# Patient Record
Sex: FEMALE | Race: WHITE | Employment: OTHER | URBAN - NONMETROPOLITAN AREA
[De-identification: names, ages, dates, MRNs, and addresses within clinical notes are randomized per-mention and may not be internally consistent; named-entity substitution may affect disease eponyms.]

---

## 2017-09-18 ENCOUNTER — APPOINTMENT (OUTPATIENT)
Dept: NUCLEAR MEDICINE | Age: 82
DRG: 069 | End: 2017-09-18
Payer: MEDICARE

## 2017-09-18 ENCOUNTER — APPOINTMENT (OUTPATIENT)
Dept: GENERAL RADIOLOGY | Age: 82
DRG: 069 | End: 2017-09-18
Payer: MEDICARE

## 2017-09-18 ENCOUNTER — HOSPITAL ENCOUNTER (OUTPATIENT)
Age: 82
Setting detail: OBSERVATION
Discharge: HOME | DRG: 069 | End: 2017-09-20
Attending: EMERGENCY MEDICINE | Admitting: FAMILY MEDICINE
Payer: MEDICARE

## 2017-09-18 DIAGNOSIS — N39.0 URINARY TRACT INFECTION WITHOUT HEMATURIA, SITE UNSPECIFIED: ICD-10-CM

## 2017-09-18 DIAGNOSIS — R07.9 CHEST PAIN, UNSPECIFIED TYPE: Primary | ICD-10-CM

## 2017-09-18 DIAGNOSIS — E87.6 HYPOKALEMIA: ICD-10-CM

## 2017-09-18 PROBLEM — R07.2 PRECORDIAL PAIN: Status: ACTIVE | Noted: 2017-09-18

## 2017-09-18 LAB
ALBUMIN SERPL-MCNC: 3 G/DL (ref 3.5–5.2)
ALP BLD-CCNC: 71 U/L (ref 35–104)
ALT SERPL-CCNC: 15 U/L (ref 5–33)
ANION GAP SERPL CALCULATED.3IONS-SCNC: 13 MMOL/L (ref 7–19)
APTT: 28.6 SEC (ref 26–36.2)
AST SERPL-CCNC: 22 U/L (ref 5–32)
BACTERIA: NEGATIVE /HPF
BASOPHILS ABSOLUTE: 0 K/UL (ref 0–0.2)
BASOPHILS RELATIVE PERCENT: 0.4 % (ref 0–1)
BILIRUB SERPL-MCNC: 0.3 MG/DL (ref 0.2–1.2)
BILIRUBIN URINE: NEGATIVE
BLOOD, URINE: NEGATIVE
BUN BLDV-MCNC: 17 MG/DL (ref 8–23)
CALCIUM SERPL-MCNC: 7.9 MG/DL (ref 8.8–10.2)
CHLORIDE BLD-SCNC: 104 MMOL/L (ref 98–111)
CLARITY: ABNORMAL
CO2: 20 MMOL/L (ref 22–29)
COLOR: YELLOW
CREAT SERPL-MCNC: 0.8 MG/DL (ref 0.5–0.9)
EOSINOPHILS ABSOLUTE: 0.3 K/UL (ref 0–0.6)
EOSINOPHILS RELATIVE PERCENT: 3.3 % (ref 0–5)
EPITHELIAL CELLS, UA: 1 /HPF (ref 0–5)
GFR NON-AFRICAN AMERICAN: >60
GLUCOSE BLD-MCNC: 105 MG/DL (ref 74–109)
GLUCOSE URINE: NEGATIVE MG/DL
HCT VFR BLD CALC: 30.8 % (ref 37–47)
HEMOGLOBIN: 10.3 G/DL (ref 12–16)
HYALINE CASTS: 2 /HPF (ref 0–8)
INR BLD: 1.03 (ref 0.88–1.18)
IRON SATURATION: 13 % (ref 14–50)
IRON: 37 UG/DL (ref 37–145)
KETONES, URINE: NEGATIVE MG/DL
LEUKOCYTE ESTERASE, URINE: ABNORMAL
LYMPHOCYTES ABSOLUTE: 2.5 K/UL (ref 1.1–4.5)
LYMPHOCYTES RELATIVE PERCENT: 32.4 % (ref 20–40)
MCH RBC QN AUTO: 32.8 PG (ref 27–31)
MCHC RBC AUTO-ENTMCNC: 33.4 G/DL (ref 33–37)
MCV RBC AUTO: 98.1 FL (ref 81–99)
MONOCYTES ABSOLUTE: 0.5 K/UL (ref 0–0.9)
MONOCYTES RELATIVE PERCENT: 6.5 % (ref 0–10)
NEUTROPHILS ABSOLUTE: 4.5 K/UL (ref 1.5–7.5)
NEUTROPHILS RELATIVE PERCENT: 57.1 % (ref 50–65)
NITRITE, URINE: NEGATIVE
OCCULT BLOOD DIAGNOSTIC: NORMAL
PDW BLD-RTO: 13.6 % (ref 11.5–14.5)
PERFORMED ON: NORMAL
PERFORMED ON: NORMAL
PH UA: 5.5
PLATELET # BLD: 167 K/UL (ref 130–400)
PMV BLD AUTO: 10.9 FL (ref 9.4–12.3)
POC TROPONIN I: 0 NG/ML (ref 0–0.08)
POC TROPONIN I: 0 NG/ML (ref 0–0.08)
POTASSIUM SERPL-SCNC: 3 MMOL/L (ref 3.5–5)
PRO-BNP: 467 PG/ML (ref 0–1800)
PROTEIN UA: NEGATIVE MG/DL
PROTHROMBIN TIME: 13.4 SEC (ref 12–14.6)
RBC # BLD: 3.14 M/UL (ref 4.2–5.4)
RBC UA: 3 /HPF (ref 0–4)
SODIUM BLD-SCNC: 137 MMOL/L (ref 136–145)
SPECIFIC GRAVITY UA: 1.01
TOTAL IRON BINDING CAPACITY: 279 UG/DL (ref 250–400)
TOTAL PROTEIN: 5.3 G/DL (ref 6.6–8.7)
UROBILINOGEN, URINE: 0.2 E.U./DL
VITAMIN B-12: 466 PG/ML (ref 211–946)
WBC # BLD: 7.8 K/UL (ref 4.8–10.8)
WBC UA: 21 /HPF (ref 0–5)

## 2017-09-18 PROCEDURE — 82272 OCCULT BLD FECES 1-3 TESTS: CPT

## 2017-09-18 PROCEDURE — 6360000002 HC RX W HCPCS: Performed by: FAMILY MEDICINE

## 2017-09-18 PROCEDURE — 71010 XR CHEST PORTABLE: CPT

## 2017-09-18 PROCEDURE — 6370000000 HC RX 637 (ALT 250 FOR IP): Performed by: FAMILY MEDICINE

## 2017-09-18 PROCEDURE — G0378 HOSPITAL OBSERVATION PER HR: HCPCS

## 2017-09-18 PROCEDURE — 99285 EMERGENCY DEPT VISIT HI MDM: CPT

## 2017-09-18 PROCEDURE — 82747 ASSAY OF FOLIC ACID RBC: CPT

## 2017-09-18 PROCEDURE — 2580000003 HC RX 258: Performed by: INTERNAL MEDICINE

## 2017-09-18 PROCEDURE — 6370000000 HC RX 637 (ALT 250 FOR IP): Performed by: INTERNAL MEDICINE

## 2017-09-18 PROCEDURE — 85025 COMPLETE CBC W/AUTO DIFF WBC: CPT

## 2017-09-18 PROCEDURE — 83550 IRON BINDING TEST: CPT

## 2017-09-18 PROCEDURE — 83880 ASSAY OF NATRIURETIC PEPTIDE: CPT

## 2017-09-18 PROCEDURE — 87185 SC STD ENZYME DETCJ PER NZM: CPT

## 2017-09-18 PROCEDURE — 84484 ASSAY OF TROPONIN QUANT: CPT

## 2017-09-18 PROCEDURE — 87086 URINE CULTURE/COLONY COUNT: CPT

## 2017-09-18 PROCEDURE — 99284 EMERGENCY DEPT VISIT MOD MDM: CPT | Performed by: EMERGENCY MEDICINE

## 2017-09-18 PROCEDURE — 6360000002 HC RX W HCPCS: Performed by: EMERGENCY MEDICINE

## 2017-09-18 PROCEDURE — 82607 VITAMIN B-12: CPT

## 2017-09-18 PROCEDURE — 6370000000 HC RX 637 (ALT 250 FOR IP): Performed by: NURSE PRACTITIONER

## 2017-09-18 PROCEDURE — 87077 CULTURE AEROBIC IDENTIFY: CPT

## 2017-09-18 PROCEDURE — 93005 ELECTROCARDIOGRAM TRACING: CPT

## 2017-09-18 PROCEDURE — 85730 THROMBOPLASTIN TIME PARTIAL: CPT

## 2017-09-18 PROCEDURE — 36415 COLL VENOUS BLD VENIPUNCTURE: CPT

## 2017-09-18 PROCEDURE — 6360000002 HC RX W HCPCS: Performed by: INTERNAL MEDICINE

## 2017-09-18 PROCEDURE — 78452 HT MUSCLE IMAGE SPECT MULT: CPT

## 2017-09-18 PROCEDURE — 81001 URINALYSIS AUTO W/SCOPE: CPT

## 2017-09-18 PROCEDURE — 85610 PROTHROMBIN TIME: CPT

## 2017-09-18 PROCEDURE — 83540 ASSAY OF IRON: CPT

## 2017-09-18 PROCEDURE — 80053 COMPREHEN METABOLIC PANEL: CPT

## 2017-09-18 PROCEDURE — 3430000000 HC RX DIAGNOSTIC RADIOPHARMACEUTICAL: Performed by: FAMILY MEDICINE

## 2017-09-18 PROCEDURE — 93017 CV STRESS TEST TRACING ONLY: CPT

## 2017-09-18 PROCEDURE — A9500 TC99M SESTAMIBI: HCPCS | Performed by: FAMILY MEDICINE

## 2017-09-18 PROCEDURE — 2580000003 HC RX 258: Performed by: EMERGENCY MEDICINE

## 2017-09-18 RX ORDER — POTASSIUM CHLORIDE 20 MEQ/1
20 TABLET, EXTENDED RELEASE ORAL ONCE
Status: DISCONTINUED | OUTPATIENT
Start: 2017-09-18 | End: 2017-09-18

## 2017-09-18 RX ORDER — POTASSIUM CHLORIDE 7.45 MG/ML
10 INJECTION INTRAVENOUS ONCE
Status: COMPLETED | OUTPATIENT
Start: 2017-09-18 | End: 2017-09-18

## 2017-09-18 RX ORDER — M-VIT,TX,IRON,MINS/CALC/FOLIC 27MG-0.4MG
1 TABLET ORAL DAILY
Status: DISCONTINUED | OUTPATIENT
Start: 2017-09-18 | End: 2017-09-18

## 2017-09-18 RX ORDER — DOXYCYCLINE HYCLATE 100 MG/1
100 CAPSULE ORAL EVERY 12 HOURS SCHEDULED
Status: DISCONTINUED | OUTPATIENT
Start: 2017-09-18 | End: 2017-09-20 | Stop reason: HOSPADM

## 2017-09-18 RX ORDER — TOPIRAMATE 50 MG/1
25 TABLET, FILM COATED ORAL DAILY
Status: DISCONTINUED | OUTPATIENT
Start: 2017-09-18 | End: 2017-09-20 | Stop reason: HOSPADM

## 2017-09-18 RX ORDER — ACETAMINOPHEN 325 MG/1
650 TABLET ORAL EVERY 4 HOURS PRN
Status: DISCONTINUED | OUTPATIENT
Start: 2017-09-18 | End: 2017-09-18 | Stop reason: SDUPTHER

## 2017-09-18 RX ORDER — PANTOPRAZOLE SODIUM 40 MG/1
40 TABLET, DELAYED RELEASE ORAL
Status: DISCONTINUED | OUTPATIENT
Start: 2017-09-18 | End: 2017-09-18

## 2017-09-18 RX ORDER — ACETAMINOPHEN 325 MG/1
650 TABLET ORAL EVERY 4 HOURS PRN
Status: DISCONTINUED | OUTPATIENT
Start: 2017-09-18 | End: 2017-09-20 | Stop reason: HOSPADM

## 2017-09-18 RX ORDER — OXYBUTYNIN CHLORIDE 5 MG/1
10 TABLET, EXTENDED RELEASE ORAL NIGHTLY
Status: DISCONTINUED | OUTPATIENT
Start: 2017-09-18 | End: 2017-09-18

## 2017-09-18 RX ORDER — ASPIRIN 325 MG
325 TABLET ORAL ONCE
Status: DISCONTINUED | OUTPATIENT
Start: 2017-09-18 | End: 2017-09-18

## 2017-09-18 RX ORDER — MIDODRINE HYDROCHLORIDE 5 MG/1
5 TABLET ORAL 2 TIMES DAILY
Status: DISCONTINUED | OUTPATIENT
Start: 2017-09-18 | End: 2017-09-20 | Stop reason: HOSPADM

## 2017-09-18 RX ORDER — ATORVASTATIN CALCIUM 20 MG/1
20 TABLET, FILM COATED ORAL DAILY
Status: DISCONTINUED | OUTPATIENT
Start: 2017-09-18 | End: 2017-09-18

## 2017-09-18 RX ORDER — ATORVASTATIN CALCIUM 20 MG/1
20 TABLET, FILM COATED ORAL DAILY
Status: DISCONTINUED | OUTPATIENT
Start: 2017-09-18 | End: 2017-09-20 | Stop reason: HOSPADM

## 2017-09-18 RX ORDER — LEVOTHYROXINE SODIUM 0.1 MG/1
100 TABLET ORAL DAILY
Status: DISCONTINUED | OUTPATIENT
Start: 2017-09-18 | End: 2017-09-20 | Stop reason: HOSPADM

## 2017-09-18 RX ORDER — POTASSIUM CHLORIDE 3 G/15ML
40 SOLUTION ORAL ONCE
Status: COMPLETED | OUTPATIENT
Start: 2017-09-18 | End: 2017-09-18

## 2017-09-18 RX ORDER — POTASSIUM CHLORIDE 7.45 MG/ML
10 INJECTION INTRAVENOUS
Status: COMPLETED | OUTPATIENT
Start: 2017-09-18 | End: 2017-09-18

## 2017-09-18 RX ORDER — ASPIRIN 81 MG/1
81 TABLET, CHEWABLE ORAL DAILY
Status: DISCONTINUED | OUTPATIENT
Start: 2017-09-18 | End: 2017-09-20 | Stop reason: HOSPADM

## 2017-09-18 RX ORDER — ONDANSETRON 2 MG/ML
4 INJECTION INTRAMUSCULAR; INTRAVENOUS EVERY 6 HOURS PRN
Status: DISCONTINUED | OUTPATIENT
Start: 2017-09-18 | End: 2017-09-20 | Stop reason: HOSPADM

## 2017-09-18 RX ORDER — TOLTERODINE 4 MG/1
4 CAPSULE, EXTENDED RELEASE ORAL NIGHTLY
Status: DISCONTINUED | OUTPATIENT
Start: 2017-09-18 | End: 2017-09-20 | Stop reason: HOSPADM

## 2017-09-18 RX ORDER — SODIUM CHLORIDE 0.9 % (FLUSH) 0.9 %
10 SYRINGE (ML) INJECTION EVERY 12 HOURS SCHEDULED
Status: DISCONTINUED | OUTPATIENT
Start: 2017-09-18 | End: 2017-09-20 | Stop reason: HOSPADM

## 2017-09-18 RX ORDER — LEVOTHYROXINE SODIUM 0.1 MG/1
100 TABLET ORAL DAILY
Status: DISCONTINUED | OUTPATIENT
Start: 2017-09-18 | End: 2017-09-18

## 2017-09-18 RX ORDER — TOPIRAMATE 25 MG/1
25 TABLET ORAL DAILY
Status: DISCONTINUED | OUTPATIENT
Start: 2017-09-18 | End: 2017-09-18

## 2017-09-18 RX ORDER — SODIUM CHLORIDE 0.9 % (FLUSH) 0.9 %
10 SYRINGE (ML) INJECTION PRN
Status: DISCONTINUED | OUTPATIENT
Start: 2017-09-18 | End: 2017-09-19 | Stop reason: SDUPTHER

## 2017-09-18 RX ORDER — POTASSIUM CHLORIDE 20 MEQ/1
20 TABLET, EXTENDED RELEASE ORAL 2 TIMES DAILY WITH MEALS
Status: DISCONTINUED | OUTPATIENT
Start: 2017-09-18 | End: 2017-09-20 | Stop reason: HOSPADM

## 2017-09-18 RX ORDER — OMEPRAZOLE 20 MG/1
20 CAPSULE, DELAYED RELEASE ORAL 2 TIMES DAILY
Status: DISCONTINUED | OUTPATIENT
Start: 2017-09-18 | End: 2017-09-20 | Stop reason: HOSPADM

## 2017-09-18 RX ORDER — MIDODRINE HYDROCHLORIDE 5 MG/1
5 TABLET ORAL 2 TIMES DAILY
Status: DISCONTINUED | OUTPATIENT
Start: 2017-09-18 | End: 2017-09-18

## 2017-09-18 RX ADMIN — TOLTERODINE 4 MG: 4 CAPSULE, EXTENDED RELEASE ORAL at 21:37

## 2017-09-18 RX ADMIN — POTASSIUM CHLORIDE 10 MEQ: 7.46 INJECTION, SOLUTION INTRAVENOUS at 16:00

## 2017-09-18 RX ADMIN — POTASSIUM CHLORIDE 10 MEQ: 7.46 INJECTION, SOLUTION INTRAVENOUS at 10:17

## 2017-09-18 RX ADMIN — ATORVASTATIN CALCIUM 20 MG: 20 TABLET, FILM COATED ORAL at 21:37

## 2017-09-18 RX ADMIN — POTASSIUM CHLORIDE 10 MEQ: 7.46 INJECTION, SOLUTION INTRAVENOUS at 15:10

## 2017-09-18 RX ADMIN — POTASSIUM CHLORIDE 20 MEQ: 20 TABLET, EXTENDED RELEASE ORAL at 10:18

## 2017-09-18 RX ADMIN — TETRAKIS(2-METHOXYISOBUTYLISOCYANIDE)COPPER(I) TETRAFLUOROBORATE 30 MILLICURIE: 1 INJECTION, POWDER, LYOPHILIZED, FOR SOLUTION INTRAVENOUS at 15:33

## 2017-09-18 RX ADMIN — MIDODRINE HYDROCHLORIDE 5 MG: 5 TABLET ORAL at 17:19

## 2017-09-18 RX ADMIN — TOPIRAMATE 25 MG: 50 TABLET, FILM COATED ORAL at 15:32

## 2017-09-18 RX ADMIN — TETRAKIS(2-METHOXYISOBUTYLISOCYANIDE)COPPER(I) TETRAFLUOROBORATE 10 MILLICURIE: 1 INJECTION, POWDER, LYOPHILIZED, FOR SOLUTION INTRAVENOUS at 15:32

## 2017-09-18 RX ADMIN — ASPIRIN 81 MG 81 MG: 81 TABLET ORAL at 17:19

## 2017-09-18 RX ADMIN — Medication 10 ML: at 21:41

## 2017-09-18 RX ADMIN — Medication 10 ML: at 12:21

## 2017-09-18 RX ADMIN — POTASSIUM CHLORIDE 20 MEQ: 20 TABLET, EXTENDED RELEASE ORAL at 17:21

## 2017-09-18 RX ADMIN — POTASSIUM CHLORIDE 10 MEQ: 7.46 INJECTION, SOLUTION INTRAVENOUS at 06:38

## 2017-09-18 RX ADMIN — DOXYCYCLINE HYCLATE 100 MG: 100 CAPSULE, GELATIN COATED ORAL at 12:20

## 2017-09-18 RX ADMIN — CEFTRIAXONE 1 G: 1 INJECTION, POWDER, FOR SOLUTION INTRAMUSCULAR; INTRAVENOUS at 06:33

## 2017-09-18 RX ADMIN — Medication 40 MEQ: at 01:51

## 2017-09-18 RX ADMIN — DOXYCYCLINE HYCLATE 100 MG: 100 CAPSULE, GELATIN COATED ORAL at 21:36

## 2017-09-18 RX ADMIN — ENOXAPARIN SODIUM 40 MG: 40 INJECTION SUBCUTANEOUS at 10:17

## 2017-09-18 RX ADMIN — REGADENOSON 0.4 MG: 0.08 INJECTION, SOLUTION INTRAVENOUS at 15:33

## 2017-09-18 RX ADMIN — POTASSIUM CHLORIDE 10 MEQ: 7.46 INJECTION, SOLUTION INTRAVENOUS at 17:20

## 2017-09-18 RX ADMIN — LEVOTHYROXINE SODIUM 100 MCG: 0.1 TABLET ORAL at 15:33

## 2017-09-18 RX ADMIN — OMEPRAZOLE 20 MG: 20 CAPSULE, DELAYED RELEASE ORAL at 21:37

## 2017-09-18 ASSESSMENT — ENCOUNTER SYMPTOMS
RESPIRATORY NEGATIVE: 1
SHORTNESS OF BREATH: 0
ABDOMINAL DISTENTION: 0
GASTROINTESTINAL NEGATIVE: 1
EYE DISCHARGE: 0
NAUSEA: 0
VOMITING: 0
TROUBLE SWALLOWING: 0
EYES NEGATIVE: 1
EYE REDNESS: 0
ALLERGIC/IMMUNOLOGIC NEGATIVE: 1
PHOTOPHOBIA: 0
ABDOMINAL PAIN: 0
DIARRHEA: 0
COUGH: 0

## 2017-09-18 ASSESSMENT — PAIN SCALES - GENERAL
PAINLEVEL_OUTOF10: 5
PAINLEVEL_OUTOF10: 0

## 2017-09-18 ASSESSMENT — PAIN DESCRIPTION - DESCRIPTORS: DESCRIPTORS: THROBBING

## 2017-09-18 ASSESSMENT — PAIN DESCRIPTION - LOCATION: LOCATION: CHEST

## 2017-09-18 ASSESSMENT — PAIN DESCRIPTION - ORIENTATION: ORIENTATION: MID;LEFT

## 2017-09-18 NOTE — PLAN OF CARE
Problem: SAFETY  Goal: Free from accidental physical injury  Outcome: Ongoing  Goal: Free from intentional harm  Outcome: Ongoing    Problem: DAILY CARE  Goal: Daily care needs are met  Outcome: Ongoing    Problem: PAIN  Goal: Patients pain/discomfort is manageable  Outcome: Ongoing    Problem: SKIN INTEGRITY  Goal: Skin integrity is maintained or improved  Outcome: Ongoing

## 2017-09-18 NOTE — IP AVS SNAPSHOT
After Visit Summary  (Discharge Instructions)    Medication List for Home    Based on the information you provided to us as well as any changes during this visit, the following is your updated medication list.  Compare this with your prescription bottles at home. If you have any questions or concerns, contact your primary care physician's office. Daily Medication List (This medication list can be shared with any healthcare provider who is helping you manage your medications)      There are NEW medications for you.  START taking them after you leave the hospital        Last Dose    Next Dose Due AM NOON PM NIGHT    aspirin 81 MG chewable tablet   Take 1 tablet by mouth daily                81 mg on 9/20/2017  9:09 AM     9/21                          ciprofloxacin 500 MG tablet   Commonly known as:  CIPRO   Take 0.5 tablets by mouth 2 times daily for 10 days                  9/20                          metoprolol succinate 25 MG extended release tablet   Commonly known as:  TOPROL XL   Take 1 tablet by mouth daily                25 mg on 9/20/2017  9:09 AM     9/21                            These are medications you told us you were taking at home, CONTINUE taking them after you leave the hospital        Last Dose    Next Dose Due AM NOON PM NIGHT    Acetaminophen 650 MG Tabs   Take 650 mg by mouth every 4 hours as needed                  As needed                       atorvastatin 20 MG tablet   Commonly known as:  LIPITOR   Take 20 mg by mouth nightly                20 mg on 9/20/2017  9:17 AM     9/20                          levothyroxine 100 MCG tablet   Commonly known as:  SYNTHROID   Take 100 mcg by mouth Daily                100 mcg on 9/20/2017  5:52 AM     9/21                          midodrine 2.5 MG tablet   Commonly known as:  PROAMATINE   Take 5 mg by mouth 2 times daily                5 mg on 9/20/2017  9:22 AM     9/20 changed, so think of one that is secure and easy to remember. 6. Create a [employer] password. You can change your password at any time. 7. Enter your Password Reset Question and Answer. This can be used at a later time if you forget your password. 8. Enter your e-mail address. You will receive e-mail notification when new information is available in [address] 9. Click Sign Up. You can now view your medical record. Additional Information  If you have questions, please contact the physician practice where you receive care. Remember, [employer] is NOT to be used for urgent needs. For medical emergencies, dial 911. For questions regarding your [employer] account call [phone]. If you have a clinical question, please call your doctor's office. View your information online  ? Review your current list of  medications, immunization, and allergies. ? Review your future test results online . ? Review your discharge instructions provided by your caregivers at discharge    Certain functionality such as prescription refills, scheduling appointments or sending messages to your provider are not activated if your provider does not use [employer] in his/her office    For questions regarding your [employer] account call [phone]. If you have a clinical question, please call your doctor's office. The information on all pages of the After Visit Summary has been reviewed with me, the patient and/or responsible adult, by my health care provider(s). I had the opportunity to ask questions regarding this information. I understand I should dispose of my armband safely at home to protect my health information. A complete copy of the After Visit Summary has been given to me, the patient and/or responsible adult.            Patient Signature/Responsible Adult:____________________    Clinician Signature:_____________________    Date:_____________________    Time:_____________________

## 2017-09-18 NOTE — ED PROVIDER NOTES
Genitourinary: Negative for difficulty urinating. Skin: Negative for rash and wound. Neurological: Negative for dizziness, light-headedness and headaches. Psychiatric/Behavioral: Negative for behavioral problems and confusion. All other systems reviewed and are negative. Except as noted above the remainder of the review of systems was reviewed and negative. PAST MEDICAL HISTORY     Past Medical History:   Diagnosis Date    Anxiety     GERD (gastroesophageal reflux disease)     Macular degeneration     Pneumonia     Weakness          SURGICAL HISTORY       Past Surgical History:   Procedure Laterality Date    CATARACT REMOVAL WITH IMPLANT  10/2015    right eye    CHOLECYSTECTOMY      COLON SURGERY      straighten kink    HIP SURGERY      right replacement         CURRENT MEDICATIONS       Current Discharge Medication List      CONTINUE these medications which have NOT CHANGED    Details   acetaminophen 650 MG TABS Take 650 mg by mouth every 4 hours as needed  Qty: 120 tablet, Refills: 3      omeprazole (PRILOSEC) 20 MG capsule Take 20 mg by mouth 2 times daily      midodrine (PROAMATINE) 2.5 MG tablet Take 5 mg by mouth 2 times daily       levothyroxine (SYNTHROID) 100 MCG tablet Take 100 mcg by mouth Daily      topiramate (TOPAMAX) 25 MG tablet Take 25 mg by mouth daily      atorvastatin (LIPITOR) 20 MG tablet Take 20 mg by mouth nightly       tolterodine (DETROL LA) 4 MG ER capsule Take 4 mg by mouth nightly       Multiple Vitamins-Minerals (THERAPEUTIC MULTIVITAMIN-MINERALS) tablet Take 1 tablet by mouth daily             ALLERGIES     Review of patient's allergies indicates no known allergies.     FAMILY HISTORY       Family History   Problem Relation Age of Onset    Heart Disease Mother     Stroke Mother     Heart Disease Father     Heart Disease Sister     Obesity Sister     No Known Problems Brother           SOCIAL HISTORY       Social History     Social History    Marital previous  Read by Dr Rui Palacio at [address] [place] [address]:   Non-plain film images such as CT, Ultrasound and MRI are read by the radiologist. Plain radiographic images are visualized and preliminarily interpreted by the emergency physician with the below findings:      Interpretation per the Radiologist below, if available at the time of this note:    XR Chest Portable   Final Result   Impression:   1. No acute cardiopulmonary disease. 2.  Findings suggestive of COPD. Signed by Dr Judy Presley on 9/18/2017 7:26 AM      NM Myocardial Spect Rest Multi    (Results Pending)         ED BEDSIDE ULTRASOUND:   Performed by ED Physician - none    LABS:  Labs Reviewed   CBC WITH AUTO DIFFERENTIAL - Abnormal; Notable for the following:        Result Value    RBC 3.14 (*)     Hemoglobin 10.3 (*)     Hematocrit 30.8 (*)     MCH 32.8 (*)     All other components within normal limits   COMPREHENSIVE METABOLIC PANEL - Abnormal; Notable for the following:     Potassium 3.0 (*)     CO2 20 (*)     Calcium 7.9 (*)     Total Protein 5.3 (*)     Alb 3.0 (*)     All other components within normal limits   URINALYSIS - Abnormal; Notable for the following:     Clarity, UA CLOUDY (*)     Leukocyte Esterase, Urine MODERATE (*)     All other components within normal limits   MICROSCOPIC URINALYSIS - Abnormal; Notable for the following:     WBC, UA 21 (*)     All other components within normal limits   IRON AND TIBC - Abnormal; Notable for the following:     Iron Saturation 13 (*)     All other components within normal limits   URINE CULTURE   APTT   PROTIME-INR   BRAIN NATRIURETIC PEPTIDE   VITAMIN B12   FOLATE RBC   BLOOD OCCULT STOOL DIAGNOSTIC   POCT TROPONIN   POCT TROPONIN   POCT VENOUS   POCT VENOUS       All other labs were within normal range or not returned as of this dictation.     EMERGENCY DEPARTMENT COURSE and DIFFERENTIAL DIAGNOSIS/MDM:   Vitals:    Vitals:    09/18/17 0800 09/18/17 0819 09/18/17 1035 09/18/17 1503   BP: (!) 143/74 (!) 146/71 138/74 137/81   Pulse: 106 87 82 96   Resp: 17 16 16 16   Temp: 98.9 °F (37.2 °C) 97 °F (36.1 °C) 97 °F (36.1 °C) 97 °F (36.1 °C)   TempSrc:  Temporal Temporal Temporal   SpO2: 94% 99% 92% 96%   Weight:  84 lb (38.1 kg)     Height:  5' 1\" (1.549 m)             MDM      CONSULTS:  None    PROCEDURES:  Unless otherwise noted below, none     Procedures    FINAL IMPRESSION      1. Chest pain, unspecified type    2. Hypokalemia    3.  Urinary tract infection without hematuria, site unspecified          DISPOSITION/PLAN   DISPOSITION     PATIENT REFERRED TO:  Kirti Valencia MD  [facility] [phone]            DISCHARGE MEDICATIONS:  Current Discharge Medication List             (Please note that portions of this note were completed with a voice recognition program.  Efforts were made to edit the dictations but occasionally words are mis-transcribed.)    MARY Zheng (electronically signed)         MARY Zheng  09/18/17 [phone]

## 2017-09-18 NOTE — H&P
tablet by mouth 2 times daily (with meals) 1/30/16   Cheryle Andrade MD   nystatin (MYCOSTATIN) 297441 UNIT/ML suspension Take 5 mLs by mouth 4 times daily 1/30/16   Cheryle Andrade MD   doxycycline (MONODOX) 100 MG capsule Take 1 capsule by mouth every 12 hours 1/30/16   Cheryle Andrade MD   methylPREDNISolone (MEDROL DOSEPACK) 4 MG tablet Take by mouth as directed. 1/30/16   Cheryle Andrade MD   acetaminophen 650 MG TABS Take 650 mg by mouth every 4 hours as needed 1/18/16   Cheryle Andrade MD   omeprazole (PRILOSEC) 20 MG capsule Take 20 mg by mouth 2 times daily    Historical Provider, MD   midodrine (PROAMATINE) 2.5 MG tablet Take 5 mg by mouth 2 times daily     Historical Provider, MD   levothyroxine (SYNTHROID) 100 MCG tablet Take 100 mcg by mouth Daily    Historical Provider, MD   topiramate (TOPAMAX) 25 MG tablet Take 25 mg by mouth daily    Historical Provider, MD   atorvastatin (LIPITOR) 20 MG tablet Take 20 mg by mouth daily    Historical Provider, MD   tolterodine (DETROL LA) 4 MG ER capsule Take 4 mg by mouth daily    Historical Provider, MD   Multiple Vitamins-Minerals (THERAPEUTIC MULTIVITAMIN-MINERALS) tablet Take 1 tablet by mouth daily    Historical Provider, MD       Allergies:  Review of patient's allergies indicates no known allergies. Social History:   Social History     Social History    Marital status:      Spouse name: N/A    Number of children: N/A    Years of education: N/A     Occupational History    Not on file. Social History Main Topics    Smoking status: Never Smoker    Smokeless tobacco: Not on file    Alcohol use No    Drug use: No    Sexual activity: Not on file     Other Topics Concern    Not on file     Social History Narrative       Family History:   History reviewed. No pertinent family history.         Physical Exam:    Vitals: BP (!) 143/74  Pulse 106  Temp 98.9 °F (37.2 °C)  Resp 17  Ht 5' 1\" (1.549 m)  Wt 89 lb (40.4 kg)  SpO2 94%  BMI 16.82 kg/m2    Objective:  General Appearance:  Comfortable and in no acute distress. Vital signs: (most recent): Blood pressure (!) 143/74, pulse 106, temperature 98.9 °F (37.2 °C), resp. rate 17, height 5' 1\" (1.549 m), weight 89 lb (40.4 kg), SpO2 94 %. Vital signs are normal.  No fever. Output: Producing urine and producing stool. HEENT: Normal HEENT exam.    Lungs:  Normal respiratory rate and normal effort. Heart: Normal rate. Regular rhythm. Extremities: Normal range of motion. Neurological: Patient is alert and oriented to person, place and time. Skin:  Warm and dry. Abdomen: Abdomen is soft. Hyperactive bowel sounds. Pupils:  Pupils are equal, round, and reactive to light. Pulses: Distal pulses are intact. CBC:   Recent Labs      09/18/17 0039   WBC  7.8   HGB  10.3*   PLT  167     BMP:    Recent Labs      09/18/17 0039   NA  137   K  3.0*   CL  104   CO2  20*   BUN  17   CREATININE  0.8   GLUCOSE  105     Hepatic:   Recent Labs      09/18/17   0039   AST  22   ALT  15   BILITOT  0.3   ALKPHOS  71     Troponin T:   Recent Labs      09/18/17   0046  09/18/17   0311   TROPONINI  0.00  0.00     Pro-BNP: No results for input(s): BNP in the last 72 hours. Lipids: No results for input(s): CHOL, HDL in the last 72 hours. Invalid input(s): LDLCALCU  ABGs:   Lab Results   Component Value Date    PHART 7.530 01/05/2016    PO2ART 87.0 01/05/2016    AJH3UPT 24.0 01/05/2016     INR:   Recent Labs      09/18/17 0039   INR  1.03     -----------------------------------------------------------------  EKG: non-acute  Radiology:     Xr Chest Portable    Result Date: 9/18/2017  Exam:   XR CHEST PORTABLE  Date:  9/18/2017 History:  Female, age  80 years; chest pain COMPARISON:  Chest x-ray dated 1/24/2016 Findings : The heart and mediastinum are normal in size. Lungs are without focal infiltrate, mass or effusions. Chronic obstructive lung changes.  Suspected small hiatal hernia. The bones show no acute pathology. Impression: 1. No acute cardiopulmonary disease. 2.  Findings suggestive of COPD. Signed by Dr Kylie Archer on 9/18/2017 7:26 AM      Assessment and Plan   1. Chest Pain  2. UTI  3. Anemia  4. Hypokalemia  5.   COPD    PLAN:    ADMIT TO PCU  RULE OUT ISCHEMIA  IV ABX FOR UTI - CHECK CULTURE  ANEMIA WORKUP      Murali Perez M.D.  9/18/2017

## 2017-09-18 NOTE — ED NOTES
Bed: 06  Expected date:   Expected time:   Means of arrival: [facility]  Comments:  EMS     Igor Arzate RN  09/18/17 0891

## 2017-09-18 NOTE — ED NOTES
Called the answering service and they connected the call to Dr. Ricardo Gooden.      Bryanna Shaikh  09/18/17 0021

## 2017-09-18 NOTE — PROGRESS NOTES
Pt is resting comfortably in bed at this time. Pt has no complaints and family remains at bedside. Will continue to monitor.

## 2017-09-18 NOTE — PROGRESS NOTES
Pt am medications were held due to pt being NPO, will give medications when pt is able to eat. Pt states that she gets nauseated if NPO and takes medications.

## 2017-09-19 LAB
CHOLESTEROL, TOTAL: 126 MG/DL (ref 160–199)
EKG P AXIS: 71 DEGREES
EKG P-R INTERVAL: 176 MS
EKG Q-T INTERVAL: 350 MS
EKG QRS DURATION: 102 MS
EKG QTC CALCULATION (BAZETT): 406 MS
EKG T AXIS: 63 DEGREES
HCT VFR BLD CALC: 30.8 %
HDLC SERPL-MCNC: 73 MG/DL (ref 65–121)
LDL CHOLESTEROL CALCULATED: 43 MG/DL
LV EF: 45 %
LV EF: 63 %
LVEF MODALITY: NORMAL
LVEF MODALITY: NORMAL
RBC FOLATE: NORMAL NG/ML
TRIGL SERPL-MCNC: 48 MG/DL (ref 150–199)
TSH SERPL DL<=0.05 MIU/L-ACNC: 0.15 UIU/ML (ref 0.27–4.2)

## 2017-09-19 PROCEDURE — 36415 COLL VENOUS BLD VENIPUNCTURE: CPT

## 2017-09-19 PROCEDURE — 80061 LIPID PANEL: CPT

## 2017-09-19 PROCEDURE — 6370000000 HC RX 637 (ALT 250 FOR IP): Performed by: NURSE PRACTITIONER

## 2017-09-19 PROCEDURE — 6360000004 HC RX CONTRAST MEDICATION: Performed by: INTERNAL MEDICINE

## 2017-09-19 PROCEDURE — G0378 HOSPITAL OBSERVATION PER HR: HCPCS

## 2017-09-19 PROCEDURE — 2580000003 HC RX 258: Performed by: INTERNAL MEDICINE

## 2017-09-19 PROCEDURE — 93005 ELECTROCARDIOGRAM TRACING: CPT

## 2017-09-19 PROCEDURE — 6370000000 HC RX 637 (ALT 250 FOR IP): Performed by: INTERNAL MEDICINE

## 2017-09-19 PROCEDURE — C8923 2D TTE W OR W/O FOL W/CON,CO: HCPCS

## 2017-09-19 PROCEDURE — 2580000003 HC RX 258: Performed by: FAMILY MEDICINE

## 2017-09-19 PROCEDURE — 99204 OFFICE O/P NEW MOD 45 MIN: CPT | Performed by: INTERNAL MEDICINE

## 2017-09-19 PROCEDURE — 6360000002 HC RX W HCPCS: Performed by: FAMILY MEDICINE

## 2017-09-19 PROCEDURE — 6370000000 HC RX 637 (ALT 250 FOR IP): Performed by: FAMILY MEDICINE

## 2017-09-19 PROCEDURE — 84443 ASSAY THYROID STIM HORMONE: CPT

## 2017-09-19 RX ORDER — SODIUM CHLORIDE 0.9 % (FLUSH) 0.9 %
10 SYRINGE (ML) INJECTION PRN
Status: DISCONTINUED | OUTPATIENT
Start: 2017-09-19 | End: 2017-09-20 | Stop reason: HOSPADM

## 2017-09-19 RX ORDER — METOPROLOL SUCCINATE 25 MG/1
25 TABLET, EXTENDED RELEASE ORAL DAILY
Status: DISCONTINUED | OUTPATIENT
Start: 2017-09-19 | End: 2017-09-20 | Stop reason: HOSPADM

## 2017-09-19 RX ADMIN — OMEPRAZOLE 20 MG: 20 CAPSULE, DELAYED RELEASE ORAL at 07:53

## 2017-09-19 RX ADMIN — METOPROLOL SUCCINATE 25 MG: 25 TABLET, EXTENDED RELEASE ORAL at 11:18

## 2017-09-19 RX ADMIN — ASPIRIN 81 MG 81 MG: 81 TABLET ORAL at 07:57

## 2017-09-19 RX ADMIN — POTASSIUM CHLORIDE 20 MEQ: 20 TABLET, EXTENDED RELEASE ORAL at 17:38

## 2017-09-19 RX ADMIN — NYSTATIN 500000 UNITS: 100000 SUSPENSION ORAL at 17:40

## 2017-09-19 RX ADMIN — MIDODRINE HYDROCHLORIDE 5 MG: 5 TABLET ORAL at 07:54

## 2017-09-19 RX ADMIN — PERFLUTREN 1.5 ML: 6.52 INJECTION, SUSPENSION INTRAVENOUS at 15:30

## 2017-09-19 RX ADMIN — LEVOTHYROXINE SODIUM 100 MCG: 0.1 TABLET ORAL at 06:26

## 2017-09-19 RX ADMIN — TOPIRAMATE 25 MG: 50 TABLET, FILM COATED ORAL at 07:54

## 2017-09-19 RX ADMIN — POTASSIUM CHLORIDE 20 MEQ: 20 TABLET, EXTENDED RELEASE ORAL at 07:57

## 2017-09-19 RX ADMIN — DOXYCYCLINE HYCLATE 100 MG: 100 CAPSULE, GELATIN COATED ORAL at 20:39

## 2017-09-19 RX ADMIN — CEFTRIAXONE SODIUM 1 G: 1 INJECTION, POWDER, FOR SOLUTION INTRAMUSCULAR; INTRAVENOUS at 06:26

## 2017-09-19 RX ADMIN — ENOXAPARIN SODIUM 40 MG: 40 INJECTION SUBCUTANEOUS at 08:02

## 2017-09-19 RX ADMIN — OMEPRAZOLE 20 MG: 20 CAPSULE, DELAYED RELEASE ORAL at 20:39

## 2017-09-19 RX ADMIN — Medication 10 ML: at 20:40

## 2017-09-19 RX ADMIN — MIDODRINE HYDROCHLORIDE 5 MG: 5 TABLET ORAL at 17:38

## 2017-09-19 RX ADMIN — Medication 10 ML: at 15:30

## 2017-09-19 RX ADMIN — DOXYCYCLINE HYCLATE 100 MG: 100 CAPSULE, GELATIN COATED ORAL at 07:56

## 2017-09-19 RX ADMIN — Medication 10 ML: at 07:55

## 2017-09-19 RX ADMIN — ATORVASTATIN CALCIUM 20 MG: 20 TABLET, FILM COATED ORAL at 07:52

## 2017-09-19 RX ADMIN — Medication 1 TABLET: at 07:51

## 2017-09-19 RX ADMIN — NYSTATIN 500000 UNITS: 100000 SUSPENSION ORAL at 13:57

## 2017-09-19 RX ADMIN — TOLTERODINE 4 MG: 4 CAPSULE, EXTENDED RELEASE ORAL at 20:39

## 2017-09-19 ASSESSMENT — PAIN SCALES - GENERAL
PAINLEVEL_OUTOF10: 0

## 2017-09-19 NOTE — PLAN OF CARE
Problem: Nutrition  Goal: Optimal nutrition therapy  Outcome: Ongoing  Nutrition Problem: Severe malnutrition, in context of chronic illness  Intervention: Food and/or Nutrient Delivery: Continue current diet, Start ONS  Nutritional Goals: PO %. No new wt loss.

## 2017-09-19 NOTE — CONSULTS
[facility]      Cardiology Consultation      Date of Admission:  9/18/2017 12:31 AM    Date of Initially Being Seen / Consultation:  9/19/17    Cardiologist:  Dr. Krzysztof Brown   Attending: Dr. Suzi Wilson      PCP:  Loco Rothman MD    Reason for Consultation or Admission / Chief Complaint:  Paroxysmal atrial fibrillation    SUBJECTIVE AND HISTORY OF PRESENT ILLNESS:    Source of the history:  Patient, family, previous inpatient and outpatient records in [facility]. Laxmi Spencer is a 80 y.o. female who presents to [facility] emergency room with symptoms / signs / problem or diagnosis of chest pressure and tightness that began on the evening of 9/17/2017. Patient underwent a nuclear [facility] on 9/18/20017 which was grossly negative for the presence of ischemia or infarction with normal wall motion and ejection fraction at rest. During the night patient developed paroxymal atrial fibrillation leading to a cardiology consult. Patient states that she does have a history of this. Patient denied current chest pain, pressure, and tightness. Patient denied feeling palpitations, lightheaded, dizziness, near syncope, and syncope. Patient denied fever, chills, and recent illness. Patient denied diaphoresis, dyspnea, nausea, and vomiting.         CARDIAC RISK PROFILE:    Risk Factor Yes / No / Unknown       Gender Female   Cigarette Use No   Family History of Heart Disease Yes: Mother, Father, and Sister   Diabetes Mellitus No   Hypercholesteremia No   Hypertension No          Cardiac Specific Problems:    Specialty Problems        Cardiology Problems    Hypotension        Syncope and collapse                PRIOR CARDIAC PROBLEM LIST  (IF APPLICABLE):    TTE 5/80/2899:  Mitral valve leaflets are mildly thickened with preserved leaflet mobility.   Moderately severe mitral regurgitation is present.  Alicia Jonathan thickened aortic valve leaflets with preserved leaflet mobility.   There is trivial tricuspid regurgitation with estimated RVSP of 46 mm Hg.   Mild concentric left ventricular hypertrophy.   Normal left ventricular size with preserved LV function and an estimated ejection fraction of approximately 55-60%.   No evidence of left ventricular mass or thrombus noted.  E/A flow reversal noted. Suggestive of diastolic dysfunction. Past Medical History:    Past Medical History:   Diagnosis Date    Anxiety     GERD (gastroesophageal reflux disease)     Macular degeneration     Pneumonia     Weakness          Past Surgical History:    Past Surgical History:   Procedure Laterality Date    CATARACT REMOVAL WITH IMPLANT  10/2015    right eye    CHOLECYSTECTOMY      COLON SURGERY      straighten kink    HIP SURGERY      right replacement         Home Medications:   Prior to Admission medications    Medication Sig Start Date End Date Taking?  Authorizing Provider   acetaminophen 650 MG TABS Take 650 mg by mouth every 4 hours as needed 1/18/16  Yes Alecia Archer MD   omeprazole (PRILOSEC) 20 MG capsule Take 20 mg by mouth 2 times daily   Yes Historical Provider, MD   midodrine (PROAMATINE) 2.5 MG tablet Take 5 mg by mouth 2 times daily    Yes Historical Provider, MD   levothyroxine (SYNTHROID) 100 MCG tablet Take 100 mcg by mouth Daily   Yes Historical Provider, MD   topiramate (TOPAMAX) 25 MG tablet Take 25 mg by mouth daily   Yes Historical Provider, MD   atorvastatin (LIPITOR) 20 MG tablet Take 20 mg by mouth nightly    Yes Historical Provider, MD   tolterodine (DETROL LA) 4 MG ER capsule Take 4 mg by mouth nightly    Yes Historical Provider, MD   Multiple Vitamins-Minerals (THERAPEUTIC MULTIVITAMIN-MINERALS) tablet Take 1 tablet by mouth daily   Yes Historical Provider, MD        Facility Administered Medications:    enoxaparin  1 mg/kg Subcutaneous Daily    metoprolol succinate  25 mg Oral Daily    sodium chloride flush  10 mL Intravenous 2 times per day    aspirin  81 mg Oral Daily    potassium elements of the care plan with the APRN and I agree with the findings and care plan as stated above unless otherwise noted. Cardiac Specific Problems:    Specialty Problems        Cardiology Problems    Hypotension        Syncope and collapse                Subjective:      Atrial fibrillation    Objective:     GENERAL - well developed and well nourished    HEENT   PERRLA, Hearing appears normal, conjunctiva and lids are normal, ears and nose appear normal  NECK - no thyromegaly, no JVD, trachea is in the midline  CARDIOVASCULAR  PMI is in the mid line clavicular position, Variable S1 and normal S2 with a grade 1/6 systolic murmur. No S3 or S4    PULMONARY  no respiratory distress. no wheezes or rales. Lungs are clear to ausculation   ABDOMEN   soft, non tender, no rebound, no hepatomegaly or splenomegaly  MUSCULOSKELETAL   gait and station are normal, digitals and nails are without clubbing or cyanosis  EXTREMITIES - traceedema  NEUROLOGIC - cranial nerves, 2-12, are normal  SKIN - turgor is normal, no rash  PSYCHIATRIC - normal mood and affect, alert and orientated x 3, judgement and insight appear appropriate    I examined the patient for these specific problems:    ASSESSMENT:    CARDIOLOGY PROBLEMS ARE LISTED ABOVE; HOWEVER, THE FOLLOWING SPECIFIC CARDIAC PROBLEMS EITHER LISTED ABOVE OR NOT,  WERE ADDRESSED AND TREATED DURING [facility] VISIT TODAY:         Cardiac Specific Problem / Diagnosis   Discussion / Medical Decision Making Plan               1. Paroxysmal atrial fibrillation Initial encounter Patient spontaneously converting from Atrial fibrillation to NSR. HR 's. Currently NSR at 91. Continue telemetry and lovenox. Initiate Toprol XL 25 mg for rate control. Check TTE. Check TSH level.                2.  Chest discomfort Initial presentation during this evaluation Currently resolved.      NEGRITA Gallegos from 9/18/2017: Grossly negative Cardiolyte for the presence of ischemia or

## 2017-09-19 NOTE — PROGRESS NOTES
[facility]  Daily Progress Note  Wero Grossman  MRN: [id number] LOS: 0    Admit Date: 9/18/2017 9/19/2017 12:52 PM    Subjective:          Chief Complaint:  Chief Complaint   Patient presents with    Chest Pain     Patient woke tonight with mid-sternal chest pain. Patient denies nausea        Interval History:    Reviewed overnight events and nursing notes. Status:  improved  Pain:  some relief    No further chest pain. Noted PAF on monitor, now in NSR    ROS:  10 point ROS obtained:   Gen: No fevers  HEENT: No migraine or visual change  Lung: No cough, hemopotysis or wheezing  Cv: No chest pain or pressure  Abd: No nausea or vomit, no change in bowel fct, no gi bleeding  Ext: No inc pain or swelling  Neuro: No seizure or syncope  Skin: No new rashes  Endo: No polyuria, polyphagia or poilydypsia  Psyc: No inc anxiety or depression  MS: No increase in joint pain or swelling reported    DIET:  DIET CARDIAC;    Medications:       enoxaparin  1 mg/kg Subcutaneous Daily    metoprolol succinate  25 mg Oral Daily    sodium chloride flush  10 mL Intravenous 2 times per day    aspirin  81 mg Oral Daily    potassium chloride  20 mEq Oral BID WC    nystatin  5 mL Oral 4x Daily    doxycycline hyclate  100 mg Oral 2 times per day    cefTRIAXone (ROCEPHIN) IV  1 g Intravenous Q24H    topiramate  25 mg Oral Daily    levothyroxine  100 mcg Oral Daily    atorvastatin  20 mg Oral Daily    midodrine  5 mg Oral BID    tolterodine  4 mg Oral Nightly    omeprazole  20 mg Oral BID    MULTI-VITAMIN/IRON  1 tablet Oral Daily       Data:     Code Status: Full Code    Family History   Problem Relation Age of Onset    Heart Disease Mother     Stroke Mother     Heart Disease Father     Heart Disease Sister     Obesity Sister     No Known Problems Brother      Social History     Social History    Marital status:       Spouse name: N/A    Number of children: N/A    Years of education: N/A Occupational History    Not on file.      Social History Main Topics    Smoking status: Never Smoker    Smokeless tobacco: Not on file    Alcohol use No    Drug use: No    Sexual activity: Not on file     Other Topics Concern    Not on file     Social History Narrative       Labs:  Hematology:  Recent Labs      17   003   WBC  7.8   HGB  10.3*   HCT  30.8*   PLT  167   INR  1.03     Chemistry:  Recent Labs      17   0039  17   0046  17   0311   NA  137   --    --    K  3.0*   --    --    CL  104   --    --    CO2  20*   --    --    GLUCOSE  105   --    --    BUN  17   --    --    CREATININE  0.8   --    --    ANIONGAP  13   --    --    LABGLOM  >60   --    --    CALCIUM  7.9*   --    --    TROPONINI   --   0.00  0.00     Recent Labs      17   00317   0130   PROT  5.3*   --    LABALBU  3.0*   --    TSH   --   0.152*   AST  22   --    ALT  15   --    ALKPHOS  71   --    BILITOT  0.3   --    CHOL   --   126*   HDL   --   73   TRIG   --   48*       Objective:     Vitals: /83  Pulse 108  Temp 97.3 °F (36.3 °C) (Temporal)   Resp 18  Ht 5' 1\" (1.549 m)  Wt 84 lb (38.1 kg)  SpO2 98%  BMI 15.87 kg/m2   Intake/Output Summary (Last 24 hours) at 17 1252  Last data filed at 17 0946   Gross per 24 hour   Intake              550 ml   Output              400 ml   Net              150 ml    Temp (24hrs), Av.5 °F (36.4 °C), Min:97 °F (36.1 °C), Max:98.1 °F (36.7 °C)    Physical Examination:   General appearance - alert, well appearing, and in no distress and oriented to person, place, and time  Mental status - alert, oriented to person, place, and time, normal mood, behavior, speech, dress, motor activity, and thought processes  Eyes - pupils equal and reactive, extraocular eye movements intact  Ears - bilateral TM's and external ear canals normal, hearing grossly normal bilaterally  Mouth - mucous membranes moist, pharynx normal without lesions  Neck - supple, no significant adenopathy  Lymphatics - no palpable lymphadenopathy, no hepatosplenomegaly  Chest - clear to auscultation, no wheezes, rales or rhonchi, symmetric air entry, no tachypnea, retractions or cyanosis  Heart - normal rate, regular rhythm, normal S1, S2, no murmurs, rubs, clicks or gallops  Abdomen - soft, nontender, nondistended, no masses or organomegaly bowel sounds normal  Neurological - alert, oriented, normal speech, no focal findings or movement disorder noted  Musculoskeletal - no joint tenderness, deformity or swelling  Extremities - peripheral pulses normal, no pedal edema, no clubbing or cyanosis  Skin - normal coloration and turgor, no rashes, no suspicious skin lesions noted      Assessment and Plan:     Primary Problem:  Precordial pain  NEW ONSET [facility]  Hospital Problem list:  Principal Problem:    Precordial pain  Active Problems:    Hyponatremia    UTI (urinary tract infection)    Anemia      PMH:  Past Medical History:   Diagnosis Date    Anxiety     GERD (gastroesophageal reflux disease)     Macular degeneration     Pneumonia     Weakness        Treatment Plan:  LEXISCAN - NEGATIVE FOR ISCHEMIA  PAF - PER CARDIOLOGY, WISH TO REPEAT ROSHNI, RATE CONTROL AND ANTICOAG. Discharge planning:   Home    Reviewed treatment plans with the patient and/or family. 20 minutes spent in face to face interaction and coordination of care.      Electronically signed by Verónica Huizar MD on 9/19/2017 at 12:52 PM

## 2017-09-20 ENCOUNTER — HOSPITAL ENCOUNTER (INPATIENT)
Age: 82
LOS: 6 days | Discharge: NURSING, REHAB OR LONG-TERM CARE | DRG: 069 | End: 2017-09-26
Attending: EMERGENCY MEDICINE | Admitting: FAMILY MEDICINE
Payer: MEDICARE

## 2017-09-20 ENCOUNTER — APPOINTMENT (OUTPATIENT)
Dept: CT IMAGING | Age: 82
DRG: 069 | End: 2017-09-20
Payer: MEDICARE

## 2017-09-20 ENCOUNTER — APPOINTMENT (OUTPATIENT)
Dept: GENERAL RADIOLOGY | Age: 82
DRG: 069 | End: 2017-09-20
Payer: MEDICARE

## 2017-09-20 VITALS
TEMPERATURE: 97.2 F | HEART RATE: 74 BPM | RESPIRATION RATE: 16 BRPM | DIASTOLIC BLOOD PRESSURE: 70 MMHG | SYSTOLIC BLOOD PRESSURE: 140 MMHG | OXYGEN SATURATION: 99 % | WEIGHT: 83.6 LBS | BODY MASS INDEX: 15.78 KG/M2 | HEIGHT: 61 IN

## 2017-09-20 DIAGNOSIS — I48.0 PAROXYSMAL ATRIAL FIBRILLATION (HCC): ICD-10-CM

## 2017-09-20 DIAGNOSIS — R47.1 DYSARTHRIA: Primary | ICD-10-CM

## 2017-09-20 LAB
ANION GAP SERPL CALCULATED.3IONS-SCNC: 11 MMOL/L (ref 7–19)
APTT: 30.5 SEC (ref 26–36.2)
BASOPHILS ABSOLUTE: 0 K/UL (ref 0–0.2)
BASOPHILS RELATIVE PERCENT: 0.4 % (ref 0–1)
BUN BLDV-MCNC: 21 MG/DL (ref 8–23)
CALCIUM SERPL-MCNC: 9.1 MG/DL (ref 8.8–10.2)
CHLORIDE BLD-SCNC: 92 MMOL/L (ref 98–111)
CHP ED QC CHECK: NORMAL
CO2: 21 MMOL/L (ref 22–29)
CREAT SERPL-MCNC: 1 MG/DL (ref 0.5–0.9)
EKG P AXIS: 50 DEGREES
EKG P AXIS: 70 DEGREES
EKG P-R INTERVAL: 190 MS
EKG P-R INTERVAL: 200 MS
EKG Q-T INTERVAL: 350 MS
EKG Q-T INTERVAL: 364 MS
EKG QRS DURATION: 100 MS
EKG QRS DURATION: 100 MS
EKG QTC CALCULATION (BAZETT): 421 MS
EKG QTC CALCULATION (BAZETT): 430 MS
EKG T AXIS: 45 DEGREES
EKG T AXIS: 95 DEGREES
EOSINOPHILS ABSOLUTE: 0.6 K/UL (ref 0–0.6)
EOSINOPHILS RELATIVE PERCENT: 11.9 % (ref 0–5)
GFR NON-AFRICAN AMERICAN: 53
GLUCOSE BLD-MCNC: 89 MG/DL
GLUCOSE BLD-MCNC: 89 MG/DL (ref 74–109)
GLUCOSE BLD-MCNC: 98 MG/DL (ref 70–99)
HCT VFR BLD CALC: 36.4 % (ref 37–47)
HEMOGLOBIN: 12 G/DL (ref 12–16)
INR BLD: 1.09 (ref 0.88–1.18)
LYMPHOCYTES ABSOLUTE: 1.4 K/UL (ref 1.1–4.5)
LYMPHOCYTES RELATIVE PERCENT: 27.1 % (ref 20–40)
MCH RBC QN AUTO: 32.3 PG (ref 27–31)
MCHC RBC AUTO-ENTMCNC: 33 G/DL (ref 33–37)
MCV RBC AUTO: 97.8 FL (ref 81–99)
MONOCYTES ABSOLUTE: 0.4 K/UL (ref 0–0.9)
MONOCYTES RELATIVE PERCENT: 7.9 % (ref 0–10)
NEUTROPHILS ABSOLUTE: 2.7 K/UL (ref 1.5–7.5)
NEUTROPHILS RELATIVE PERCENT: 52.5 % (ref 50–65)
ORGANISM: ABNORMAL
PDW BLD-RTO: 13.2 % (ref 11.5–14.5)
PERFORMED ON: NORMAL
PERFORMED ON: NORMAL
PLATELET # BLD: 214 K/UL (ref 130–400)
PMV BLD AUTO: 11.6 FL (ref 9.4–12.3)
POC TROPONIN I: 0 NG/ML (ref 0–0.08)
POTASSIUM SERPL-SCNC: 5.2 MMOL/L (ref 3.5–5)
PROTHROMBIN TIME: 14 SEC (ref 12–14.6)
RBC # BLD: 3.72 M/UL (ref 4.2–5.4)
SODIUM BLD-SCNC: 124 MMOL/L (ref 136–145)
URINE CULTURE, ROUTINE: ABNORMAL
URINE CULTURE, ROUTINE: ABNORMAL
WBC # BLD: 5.1 K/UL (ref 4.8–10.8)

## 2017-09-20 PROCEDURE — 70450 CT HEAD/BRAIN W/O DYE: CPT

## 2017-09-20 PROCEDURE — 1210000000 HC MED SURG R&B

## 2017-09-20 PROCEDURE — 99232 SBSQ HOSP IP/OBS MODERATE 35: CPT | Performed by: INTERNAL MEDICINE

## 2017-09-20 PROCEDURE — 2580000003 HC RX 258: Performed by: INTERNAL MEDICINE

## 2017-09-20 PROCEDURE — 85730 THROMBOPLASTIN TIME PARTIAL: CPT

## 2017-09-20 PROCEDURE — 84484 ASSAY OF TROPONIN QUANT: CPT

## 2017-09-20 PROCEDURE — 6370000000 HC RX 637 (ALT 250 FOR IP): Performed by: NURSE PRACTITIONER

## 2017-09-20 PROCEDURE — 2580000003 HC RX 258: Performed by: FAMILY MEDICINE

## 2017-09-20 PROCEDURE — 85025 COMPLETE CBC W/AUTO DIFF WBC: CPT

## 2017-09-20 PROCEDURE — 36415 COLL VENOUS BLD VENIPUNCTURE: CPT

## 2017-09-20 PROCEDURE — 99284 EMERGENCY DEPT VISIT MOD MDM: CPT | Performed by: EMERGENCY MEDICINE

## 2017-09-20 PROCEDURE — 6360000002 HC RX W HCPCS: Performed by: EMERGENCY MEDICINE

## 2017-09-20 PROCEDURE — 6370000000 HC RX 637 (ALT 250 FOR IP): Performed by: FAMILY MEDICINE

## 2017-09-20 PROCEDURE — 6360000002 HC RX W HCPCS: Performed by: FAMILY MEDICINE

## 2017-09-20 PROCEDURE — 99285 EMERGENCY DEPT VISIT HI MDM: CPT

## 2017-09-20 PROCEDURE — 93005 ELECTROCARDIOGRAM TRACING: CPT

## 2017-09-20 PROCEDURE — 81003 URINALYSIS AUTO W/O SCOPE: CPT

## 2017-09-20 PROCEDURE — 71010 XR CHEST PORTABLE: CPT

## 2017-09-20 PROCEDURE — 80048 BASIC METABOLIC PNL TOTAL CA: CPT

## 2017-09-20 PROCEDURE — 6370000000 HC RX 637 (ALT 250 FOR IP): Performed by: INTERNAL MEDICINE

## 2017-09-20 PROCEDURE — G0378 HOSPITAL OBSERVATION PER HR: HCPCS

## 2017-09-20 PROCEDURE — 85610 PROTHROMBIN TIME: CPT

## 2017-09-20 PROCEDURE — 99223 1ST HOSP IP/OBS HIGH 75: CPT | Performed by: PSYCHIATRY & NEUROLOGY

## 2017-09-20 PROCEDURE — 82948 REAGENT STRIP/BLOOD GLUCOSE: CPT

## 2017-09-20 RX ORDER — SODIUM CHLORIDE 0.9 % (FLUSH) 0.9 %
10 SYRINGE (ML) INJECTION PRN
Status: DISCONTINUED | OUTPATIENT
Start: 2017-09-20 | End: 2017-09-26 | Stop reason: HOSPADM

## 2017-09-20 RX ORDER — ACETAMINOPHEN 325 MG/1
650 TABLET ORAL EVERY 4 HOURS PRN
Status: DISCONTINUED | OUTPATIENT
Start: 2017-09-20 | End: 2017-09-26 | Stop reason: HOSPADM

## 2017-09-20 RX ORDER — ASPIRIN 81 MG/1
81 TABLET, CHEWABLE ORAL DAILY
Qty: 30 TABLET | Refills: 3 | Status: SHIPPED | OUTPATIENT
Start: 2017-09-20 | End: 2018-02-01

## 2017-09-20 RX ORDER — CIPROFLOXACIN 500 MG/1
250 TABLET, FILM COATED ORAL 2 TIMES DAILY
Qty: 10 TABLET | Refills: 0 | Status: ON HOLD | OUTPATIENT
Start: 2017-09-20 | End: 2017-09-26 | Stop reason: HOSPADM

## 2017-09-20 RX ORDER — SODIUM CHLORIDE 0.9 % (FLUSH) 0.9 %
10 SYRINGE (ML) INJECTION EVERY 12 HOURS SCHEDULED
Status: DISCONTINUED | OUTPATIENT
Start: 2017-09-20 | End: 2017-09-26 | Stop reason: HOSPADM

## 2017-09-20 RX ORDER — METOPROLOL SUCCINATE 25 MG/1
25 TABLET, EXTENDED RELEASE ORAL DAILY
Qty: 30 TABLET | Refills: 3 | Status: SHIPPED | OUTPATIENT
Start: 2017-09-20

## 2017-09-20 RX ADMIN — ATORVASTATIN CALCIUM 20 MG: 20 TABLET, FILM COATED ORAL at 09:17

## 2017-09-20 RX ADMIN — OMEPRAZOLE 20 MG: 20 CAPSULE, DELAYED RELEASE ORAL at 09:22

## 2017-09-20 RX ADMIN — Medication 1 TABLET: at 09:22

## 2017-09-20 RX ADMIN — DOXYCYCLINE HYCLATE 100 MG: 100 CAPSULE, GELATIN COATED ORAL at 09:09

## 2017-09-20 RX ADMIN — NYSTATIN 500000 UNITS: 100000 SUSPENSION ORAL at 09:24

## 2017-09-20 RX ADMIN — LEVOTHYROXINE SODIUM 100 MCG: 0.1 TABLET ORAL at 05:52

## 2017-09-20 RX ADMIN — Medication 10 ML: at 21:40

## 2017-09-20 RX ADMIN — POTASSIUM CHLORIDE 20 MEQ: 20 TABLET, EXTENDED RELEASE ORAL at 09:09

## 2017-09-20 RX ADMIN — CEFTRIAXONE SODIUM 1 G: 1 INJECTION, POWDER, FOR SOLUTION INTRAMUSCULAR; INTRAVENOUS at 05:52

## 2017-09-20 RX ADMIN — ENOXAPARIN SODIUM 40 MG: 40 INJECTION SUBCUTANEOUS at 09:08

## 2017-09-20 RX ADMIN — ASPIRIN 81 MG 81 MG: 81 TABLET ORAL at 09:09

## 2017-09-20 RX ADMIN — TOPIRAMATE 25 MG: 50 TABLET, FILM COATED ORAL at 09:22

## 2017-09-20 RX ADMIN — METOPROLOL SUCCINATE 25 MG: 25 TABLET, EXTENDED RELEASE ORAL at 09:09

## 2017-09-20 RX ADMIN — ENOXAPARIN SODIUM 30 MG: 100 INJECTION SUBCUTANEOUS at 21:40

## 2017-09-20 RX ADMIN — Medication 10 ML: at 09:09

## 2017-09-20 RX ADMIN — MIDODRINE HYDROCHLORIDE 5 MG: 5 TABLET ORAL at 09:22

## 2017-09-20 ASSESSMENT — ENCOUNTER SYMPTOMS
BACK PAIN: 0
CHEST TIGHTNESS: 0
PHOTOPHOBIA: 0
SHORTNESS OF BREATH: 0
VOMITING: 0
NAUSEA: 0
DIARRHEA: 0
EYE PAIN: 0
SORE THROAT: 0
COUGH: 0
ABDOMINAL PAIN: 0
RHINORRHEA: 0

## 2017-09-20 ASSESSMENT — PAIN SCALES - GENERAL
PAINLEVEL_OUTOF10: 0
PAINLEVEL_OUTOF10: 0

## 2017-09-20 NOTE — PROGRESS NOTES
PANEL:  Lab Results   Component Value Date    HDL 73 09/19/2017    LDLCALC 43 09/19/2017    TRIG 48 09/19/2017     LIVER PROFILE:  Recent Labs      09/18/17   0039   AST  22   ALT  15   LABALBU  3.0*       NURSE:  Jumana Marcial RN        Reason for initial evaluation    Atrial fibrillation      History of the Present Illness and Today's Current Status: No new complaints, ready to go home. Additionally, negative for chest pain, dyspnea and irregular heart beat. The patient was seen today for these cardiology problems:      Specialty Problems        Cardiology Problems    Hypotension        Syncope and collapse                 PFSH:  Any new information:  no       Change:  no      Review of Systems:    General:      Complaint / Symptom Yes / No / Description if Yes       Fatigue No   Weight gain N/A   Insomnia N/A       Respiratory:        Complaint / Symptom Yes / No / Description if Yes       Cough No   Horseness N/A       Cardiovascular:    Complaint / Symptom Yes / No / Description if Yes       Chest Pain No   Shortness of Air / Orthopnea No   Presyncope / Syncope No   Palpitations No         Physical Examination:    BP (!) 140/70  Pulse 74  Temp 97.2 °F (36.2 °C) (Temporal)   Resp 16  Ht 5' 1\" (1.549 m)  Wt 83 lb 9.6 oz (37.9 kg)  SpO2 99%  BMI 15.8 kg/m2    GENERAL - well developed and well nourished    HEENT   PERRLA, Hearing appears normal, conjunctiva and lids are normal, ears and nose appear normal  NECK - no thyromegaly, no JVD, trachea is in the midline  CARDIOVASCULAR  PMI is in the left mid line clavicular position, Normal S1 and S2 with a grade 1/6 systolic murmur. No S3 or S4    PULMONARY  no respiratory distress. no wheezes and rales.    ABDOMEN   soft, non tender, no rebound, no hepatomegaly or splenomegaly  MUSCULOSKELETAL   Sitting, digitals and nails are without clubbing or cyanosis  EXTREMITIES - no edema  NEUROLOGIC - cranial nerves, II-XII, are normal  SKIN - turgor is normal, no rash  PSYCHIATRIC - normal mood and affect, alert and orientated x 3, judgement and insight appear appropriate           Current Inpatient Medications:      IV Infusions (if any):        LABORATORY EVALUATION & TESTING:    I have personally reviewed and interpreted the results of the following diagnostic testing and noted in the nurse's note above      EKG and or Telemetry:  which was personally reviewed me:  Sinus rhythm,   Pulse Readings from Last 1 Encounters:   09/21/17 87           ALL THE CARDIOLOGY PROBLEMS ARE LISTED ABOVE; HOWEVER, [facility] VISIT TODAY:      Cardiac Specific Problem / Diagnosis   Discussion / Medical Decision Making Plan               1. Paroxysmal atrial fibrillation Initial encounter Patient spontaneously converting from Atrial fibrillation to NSR. HR 's. Currently NSR at 91. Continue Toprol XL 25 mg for rate control.                2. Chest discomfort Initial presentation during this evaluation Currently resolved.      NEGRITA Farah from 9/18/2017: Grossly negative Cardiolyte for the presence of ischemia or infarction with normal wall motion and ejection fraction at rest. Monitor.                3. Valvular heart disease Initial presentation during this evaluation TTE 1/12/2016:  Mitral valve leaflets are mildly thickened with preserved leaflet mobility. Moderately severe mitral regurgitation is present. Mildly thickened aortic valve leaflets with preserved leaflet mobility. There is trivial tricuspid regurgitation with estimated RVSP of 46 mm Hg. Mild concentric left ventricular hypertrophy. Normal left ventricular size with preserved LV function and an estimated ejection fraction of approximately 55-60%. No evidence of left ventricular mass or thrombus noted. E/A flow reversal noted.  Suggestive of diastolic dysfunction.  Summary   Mitral valve leaflet mobility is normal .   Mild to moderate mitral regurgitation is present.  Alicia Jonathan thickened aortic valve leaflets with preserved leaflet mobility.   Mild aortic regurgitation is noted.   Moderately dilated left atrium.   Moderate hypokinesis of the inferior segment with moderate impairment of   LV systolic function.   Moderate hypokinesis of the septal segment with moderate impairment of LV   systolic function.   Left ventricular ejection fraction is visually estimated at 45%.                 PLAN:    1. Continue present medications except for changes as noted above  2. Continue to monitor rhythm  3. Further orders per clinical course. 4. Ok to DC home             Discussed with patient and nursing.       Electronically signed by Lazaro Ulloa MD on 9/21/17 at 8:32 [id number] [facility]

## 2017-09-20 NOTE — IP AVS SNAPSHOT
Patient Information     Patient Name KIRAN Huntley 10/19/1933         This is your updated medication list to keep with you all times      TAKE these medications     Acetaminophen 650 MG Tabs   Take 650 mg by mouth every 4 hours as needed       apixaban 2.5 MG Tabs tablet   Commonly known as:  ELIQUIS   Take 1 tablet by mouth 2 times daily       aspirin 81 MG chewable tablet   Take 1 tablet by mouth daily       atorvastatin 20 MG tablet   Commonly known as:  LIPITOR       levothyroxine 100 MCG tablet   Commonly known as:  SYNTHROID       metoprolol succinate 25 MG extended release tablet   Commonly known as:  TOPROL XL   Take 1 tablet by mouth daily       midodrine 2.5 MG tablet   Commonly known as:  PROAMATINE       omeprazole 20 MG delayed release capsule   Commonly known as:  PRILOSEC       therapeutic multivitamin-minerals tablet       tolterodine 4 MG extended release capsule   Commonly known as:  DETROL LA       topiramate 25 MG tablet   Commonly known as:  TOPAMAX

## 2017-09-20 NOTE — DISCHARGE SUMMARY
Hospital Discharge Summary    Yesenia King  :  10/19/1933  MRN:  [id number]    Admit date:  2017  Discharge date:   2017      Admitting Physician:    Marcos Romero MD    Discharge Diagnoses:    Principal Problem:    Precordial pain  Active Problems:    Hyponatremia    UTI (urinary tract infection)    Anemia  NEW ONSET AFIB  NEGATIVE [facility]. Course: The patient was admitted for the above noted medical/surgical issues. Please see daily progress note for further details concerning their stay. The patient improved throughout their stay and reached maximum medical improvement on the day of discharge. The patient/family agree with the treatment plan as outlined above. All questions concerning their stay were answered prior to discharge. They understand the importance of follow up concerning any abnormal test results.      Discharge Medications:       Piyush Screen   Home Medication Instructions [facility]    Printed on:17 6025   Medication Information                      acetaminophen 650 MG TABS  Take 650 mg by mouth every 4 hours as needed             aspirin 81 MG chewable tablet  Take 1 tablet by mouth daily             atorvastatin (LIPITOR) 20 MG tablet  Take 20 mg by mouth nightly              ciprofloxacin (CIPRO) 500 MG tablet  Take 0.5 tablets by mouth 2 times daily for 10 days             levothyroxine (SYNTHROID) 100 MCG tablet  Take 100 mcg by mouth Daily             metoprolol succinate (TOPROL XL) 25 MG extended release tablet  Take 1 tablet by mouth daily             midodrine (PROAMATINE) 2.5 MG tablet  Take 5 mg by mouth 2 times daily              Multiple Vitamins-Minerals (THERAPEUTIC MULTIVITAMIN-MINERALS) tablet  Take 1 tablet by mouth daily             omeprazole (PRILOSEC) 20 MG capsule  Take 20 mg by mouth 2 times daily             tolterodine (DETROL LA) 4 MG ER capsule  Take 4 mg by mouth nightly              topiramate (TOPAMAX) 25 MG tablet  Take 25 mg

## 2017-09-20 NOTE — ED NOTES
MD made aware of pt, MD in room, CT called for stat CT for poss stroke      Kendra Ontiveros, YASHIRA  09/20/17 5524

## 2017-09-20 NOTE — IP AVS SNAPSHOT
After Visit Summary  (Discharge Instructions)    Medication List for Home    Based on the information you provided to us as well as any changes during this visit, the following is your updated medication list.  Compare this with your prescription bottles at home. If you have any questions or concerns, contact your primary care physician's office. Daily Medication List (This medication list can be shared with any healthcare provider who is helping you manage your medications)      There are NEW medications for you.  START taking them after you leave the hospital        Last Dose    Next Dose Due AM NOON PM NIGHT    apixaban 2.5 MG Tabs tablet   Commonly known as:  ELIQUIS   Take 1 tablet by mouth 2 times daily                2.5 mg on 9/26/2017  9:24 AM     09/26/17                 0900 PM             These are medications you told us you were taking at home, CONTINUE taking them after you leave the hospital        Last Dose    Next Dose Due AM NOON PM NIGHT    Acetaminophen 650 MG Tabs   Take 650 mg by mouth every 4 hours as needed                  As directed                       aspirin 81 MG chewable tablet   Take 1 tablet by mouth daily                81 mg on 9/24/2017  8:39 AM     09/27/17         0900 AM                   atorvastatin 20 MG tablet   Commonly known as:  LIPITOR   Take 20 mg by mouth nightly                20 mg on 9/25/2017  7:43 PM     09/26/17                 0900 PM           levothyroxine 100 MCG tablet   Commonly known as:  SYNTHROID   Take 100 mcg by mouth Daily                100 mcg on 9/26/2017  5:48 AM     09/27/17         0700 AM                   metoprolol succinate 25 MG extended release tablet   Commonly known as:  TOPROL XL   Take 1 tablet by mouth daily                25 mg on 9/26/2017  9:24 AM     09/27/17         0900 AM                   midodrine 2.5 MG tablet   Commonly known as:  PROAMATINE   Take 5 mg by mouth 2 times daily · Instructions about your medications including a list of your home medications  · A summary of your hospital visit  · Follow-up appointments once you have left the hospital  · Your care plan at home      You may receive a survey regarding the care you received during your stay. Your input is valuable to us. We encourage you to complete and return your survey in the envelope provided. We hope you will choose us in the future for your healthcare needs. Patient Information     Patient Name KIRAN Sanz Adjutant 10/19/1933      Care Provided at:     Name Address Phone       [address] [phone]            Your Visit    Here you will find information about your visit, including the reason for your visit. Please take this sheet with you when you visit your doctor or other health care provider in the future. It will help determine the best possible medical care for you at that time. If you have any questions once you leave the hospital, please call the department phone number listed below. Why you were here     Your primary diagnosis was:  Tia (Transient Ischemic Attack)    Your diagnoses also included:  Paroxysmal Atrial Fibrillation (Hcc), Precordial Pain, Dysarthria, Hemispheric Carotid Artery Syndrome, Weight Loss, Unintentional, Transient Alteration Of Awareness      Visit Information     Date & Time Provider Department Dept. Phone    2017 Cathryn Houston MD [address] [phone]       Follow-up Appointments    Below is a list of your follow-up and future appointments. This may not be a complete list as you may have made appointments directly with providers that we are not aware of or your providers may have made some for you. Please call your providers to confirm appointments. It is important to keep your appointments.  Please bring your current insurance card, photo ID, co-pay, and all medication bottles to your notes, and more. How Do I Sign Up? 1. In your Internet browser, go to [email or web address]  2. Click on the Sign Up Now link in the Sign In box. You will see the New Member Sign Up page. 3. Enter your [employer] Access Code exactly as it appears below. You will not need to use this code after youve completed the sign-up process. If you do not sign up before the expiration date, you must request a new code. [employer] Access Code: [email or web address]  Expires: 11/19/2017  2:48 PM    4. Enter your Social Security Number ([id number]) and Date of Birth (mm/dd/yyyy) as indicated and click Submit. You will be taken to the next sign-up page. 5. Create a [employer] ID. This will be your [employer] login ID and cannot be changed, so think of one that is secure and easy to remember. 6. Create a [employer] password. You can change your password at any time. 7. Enter your Password Reset Question and Answer. This can be used at a later time if you forget your password. 8. Enter your e-mail address. You will receive e-mail notification when new information is available in [address] 9. Click Sign Up. You can now view your medical record. Additional Information  If you have questions, please contact the physician practice where you receive care. Remember, [employer] is NOT to be used for urgent needs. For medical emergencies, dial 911. For questions regarding your [employer] account call [phone]. If you have a clinical question, please call your doctor's office. View your information online  ? Review your current list of  medications, immunization, and allergies. ? Review your future test results online . ?  Review your discharge instructions provided by your caregivers at discharge    Certain functionality such as prescription refills, scheduling appointments or sending messages to your provider are not activated if your provider does not use [employer] in his/her office · If your doctor has given you a blood thinner to prevent a stroke, be sure you get instructions about how to take your medicine safely. Blood thinners can cause serious bleeding problems. · Do not take any vitamins, over-the-counter drugs, or herbal products without talking to your doctor first.  Lifestyle changes  · Do not smoke. Smoking can increase your chance of a stroke and heart attack. If you need help quitting, talk to your doctor about stop-smoking programs and medicines. These can increase your chances of quitting for good. · Eat a heart-healthy diet. · Stay at a healthy weight. Lose weight if you need to. · Limit alcohol to 2 drinks a day for men and 1 drink a day for women. Too much alcohol can cause health problems. · Avoid colds and flu. Get a pneumococcal vaccine shot. If you have had one before, ask your doctor whether you need another dose. Get a flu shot every year. If you must be around people with colds or flu, wash your hands often. Activity  · If your doctor recommends it, get more exercise. Walking is a good choice. Bit by bit, increase the amount you walk every day. Try for at least 30 minutes on most days of the week. You also may want to swim, bike, or do other activities. Your doctor may suggest that you join a cardiac rehabilitation program so that you can have help increasing your physical activity safely. · Start light exercise if your doctor says it is okay. Even a small amount will help you get stronger, have more energy, and manage stress. Walking is an easy way to get exercise. Start out by walking a little more than you did in the hospital. Gradually increase the amount you walk. · When you exercise, watch for signs that your heart is working too hard. You are pushing too hard if you cannot talk while you are exercising. If you become short of breath or dizzy or have chest pain, sit down and rest immediately.

## 2017-09-20 NOTE — ED TRIAGE NOTES
Pt brought to ER by daughter for poss stroke, family states that pt was just released for obs for new onset of afib, family stated they went to get prescriptions filled and when they got back pt was talking and not making any sense, family states pt normally answers questions correctly and at the moment pt is not

## 2017-09-20 NOTE — ED PROVIDER NOTES
[facility]  eMERGENCY dEPARTMENT eNCOUnter      Pt Name: Reba Moore  MRN: [id number]  [place] 10/19/1933  Date of evaluation: 9/20/2017  Provider: Juan Ramirez MD    CHIEF COMPLAINT       Chief Complaint   Patient presents with    Cerebrovascular Accident       HISTORY OF PRESENT ILLNESS   (Location/Symptom, Timing/Onset, Context/Setting, Quality, Duration, Modifying Factors, Severity)  Note limiting factors. Reba Moore is a 80 y.o. female who presents to the emergency department Via private vehicle with her daughters for acute onset of dysarthria and expressive aphasia. Daughter states that the patient was recently discharged after being admitted for chest pain. Patient had echo, [place], and was discharged home today. Daughter states that she dropped her mother off at home at 3:30 PM, last time patient was seen normal. When the daughter came back home with her medications she states that the mother was \"talking gibberish\". The symptoms waxed and waned and were intermittent. She also notes that she was intermittently slurring her speech when talking as well. No noted weakness or facial droop. No other complaints at this point in time. Daughter was concerned that her mother was having a stroke. Nursing Notes were reviewed. REVIEW OF SYSTEMS    (2-9 systems for level 4, 10 or more for level 5)     Review of Systems   Constitutional: Negative for activity change, appetite change, chills and fever. HENT: Negative for congestion, nosebleeds, rhinorrhea and sore throat. Eyes: Negative for photophobia, pain and visual disturbance. Respiratory: Negative for cough, chest tightness and shortness of breath. Cardiovascular: Negative for chest pain and palpitations. Gastrointestinal: Negative for abdominal pain, diarrhea, nausea and vomiting. Genitourinary: Negative for dysuria, flank pain and hematuria. Musculoskeletal: Negative for back pain, myalgias and neck pain.    Skin: Negative for rash and wound. Neurological: Positive for speech difficulty. Negative for dizziness, syncope, weakness, light-headedness and headaches. Psychiatric/Behavioral: Negative for confusion, hallucinations and suicidal ideas. PAST MEDICAL HISTORY     Past Medical History:   Diagnosis Date    Anxiety     GERD (gastroesophageal reflux disease)     Macular degeneration     Pneumonia     Weakness        SURGICAL HISTORY       Past Surgical History:   Procedure Laterality Date    CATARACT REMOVAL WITH IMPLANT  10/2015    right eye    CHOLECYSTECTOMY      COLON SURGERY      straighten kink    HIP SURGERY      right replacement       CURRENT MEDICATIONS       Current Discharge Medication List      CONTINUE these medications which have NOT CHANGED    Details   metoprolol succinate (TOPROL XL) 25 MG extended release tablet Take 1 tablet by mouth daily  Qty: 30 tablet, Refills: 3      aspirin 81 MG chewable tablet Take 1 tablet by mouth daily  Qty: 30 tablet, Refills: 3      ciprofloxacin (CIPRO) 500 MG tablet Take 0.5 tablets by mouth 2 times daily for 10 days  Qty: 10 tablet, Refills: 0      acetaminophen 650 MG TABS Take 650 mg by mouth every 4 hours as needed  Qty: 120 tablet, Refills: 3      omeprazole (PRILOSEC) 20 MG capsule Take 20 mg by mouth 2 times daily      midodrine (PROAMATINE) 2.5 MG tablet Take 5 mg by mouth 2 times daily       levothyroxine (SYNTHROID) 100 MCG tablet Take 100 mcg by mouth Daily      topiramate (TOPAMAX) 25 MG tablet Take 25 mg by mouth daily      atorvastatin (LIPITOR) 20 MG tablet Take 20 mg by mouth nightly       tolterodine (DETROL LA) 4 MG ER capsule Take 4 mg by mouth nightly       Multiple Vitamins-Minerals (THERAPEUTIC MULTIVITAMIN-MINERALS) tablet Take 1 tablet by mouth daily             ALLERGIES     Review of patient's allergies indicates no known allergies.     FAMILY HISTORY       Family History   Problem Relation Age of Onset    Heart Disease Mother  Stroke Mother     Heart Disease Father     Heart Disease Sister     Obesity Sister     No Known Problems Brother        SOCIAL HISTORY       Social History     Social History    Marital status:      Spouse name: N/A    Number of children: N/A    Years of education: N/A     Social History Main Topics    Smoking status: Never Smoker    Smokeless tobacco: None    Alcohol use No    Drug use: No    Sexual activity: Not Asked     Other Topics Concern    None     Social History Narrative       SCREENINGS    Sina Coma Scale  Eye Opening: Spontaneous  Best Verbal Response: Oriented  Best Motor Response: Obeys commands  Sina Coma Scale Score: 15      PHYSICAL EXAM    (up to 7 for level 4, 8 or more for level 5)   ED Triage Vitals   BP Temp Temp src Pulse Resp SpO2 Height Weight   09/20/17 1640 09/20/17 1640 -- 09/20/17 1640 09/20/17 1640 09/20/17 1640 09/20/17 1640 09/20/17 1640   160/64 97.6 °F (36.4 °C)  84 17 94 % 5' 1\" (1.549 m) 84 lb (38.1 kg)       Physical Exam   Constitutional: She is oriented to person, place, and time. No distress. Old, frail, cachectic   HENT:   Head: Normocephalic and atraumatic. Mouth/Throat: Oropharynx is clear and moist.   Eyes: EOM are normal. Pupils are equal, round, and reactive to light. No scleral icterus. Neck: Normal range of motion. Neck supple. No tracheal deviation present. Cardiovascular: Normal rate, regular rhythm, normal heart sounds and intact distal pulses. Exam reveals no gallop and no friction rub. No murmur heard. Pulmonary/Chest: Effort normal and breath sounds normal. No respiratory distress. She has no wheezes. She has no rales. She exhibits no tenderness. Abdominal: Soft. She exhibits no distension and no mass. There is no tenderness. There is no rebound and no guarding. Musculoskeletal: Normal range of motion. She exhibits no edema, tenderness or deformity.    Neurological: She is alert and oriented to person, place, and time. No cranial nerve deficit. She exhibits normal muscle tone. Coordination normal.   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 Cranial nerves otherwise intact. No pronator drift. No dysmetria. Following commands. Mild dysarthria. NIH stroke scale 1 for dysarthria   Skin: Skin is warm and dry. No rash noted. Psychiatric: She has a normal mood and affect. Her behavior is normal. Judgment and thought content normal.   Nursing note and vitals reviewed. DIAGNOSTIC RESULTS     EKG: All EKG's are interpreted by the Emergency Department Physician who either signs or Co-signs this chart in the absence of a cardiologist.    EKG at 1643: Sinus rhythm at a rate of 82 bpm, LVH, ST elevations in leads V3, no respiratory changes,  ms,  ms,  ms    RADIOLOGY:   Non-plain film images such as CT, Ultrasound and MRI are read by the radiologist. Plain radiographic images are visualized and preliminarily interpreted by the emergency physician with the below findings:    XR Chest Portable   Final Result   1. Overall stable exam. No visualized acute cardiopulmonary process. Signed by Dr Vincent Deluca on 9/20/2017 5:22 PM      CT Head WO Contrast   Final Result   Moderate cerebral and cerebellar volume loss with chronic   microvascular disease but no evidence of acute intracranial process. Suspected remote bilateral subcortical infarcts with focal   encephalomalacia.    Signed by Dr Fracisco Dorsey on 9/20/2017 5:10 PM          LABS:  Labs Reviewed   BASIC METABOLIC PANEL - Abnormal; Notable for the following:        Result Value    Sodium 124 (*)     Potassium 5.2 (*)     Chloride 92 (*)     CO2 21 (*)     CREATININE 1.0 (*)     GFR Non- 53 (*)     All other components within normal limits   CBC WITH AUTO DIFFERENTIAL - Abnormal; Notable for the following:     RBC 3.72 (*)     Hematocrit 36.4 (*)     MCH 32.3 (*)     Eosinophils % 11.9 (*)     All other components within (electronically signed)  Attending Emergency Physician         Ju Tony MD  09/21/17 [id number]

## 2017-09-20 NOTE — PROGRESS NOTES
Nutrition Assessment    Type and Reason for Visit: Reassess    Nutrition Recommendations: Continue to follow for and encourage intakes. Consider appetite stimulant if within course of treatment? Malnutrition Assessment:  · Malnutrition Status: Meets the criteria for severe malnutrition  · Context: Chronic illness  · Findings of the 6 clinical characteristics of malnutrition (Minimum of 2 out of 6 clinical characteristics is required to make the diagnosis of moderate or severe Protein Calorie Malnutrition based on AND/ASPEN Guidelines):  1. Energy Intake-51% to 75%, greater than or equal to 3 months    2. Weight Loss-20% loss or greater, in 1 year  3. Fat Loss-Severe subcutaneous fat loss, Orbital, Fat overlying the ribs  4. Muscle Loss-Severe muscle mass loss, Temples (temporalis muscle), Clavicles (pectoralis and deltoids)  5. Fluid Accumulation-Mild fluid accumulation, Extremities  6.  Strength-Not measured    Nutrition Diagnosis:   · Problem: Severe malnutrition, in context of chronic illness  · Etiology: related to Insufficient energy/nutrient consumption     Signs and symptoms:  as evidenced by BMI, Weight loss greater than or equal to 20% in 1 year    Nutrition Assessment:  · Subjective Assessment: Intakes 25-50%. Pt denies any needs at this time. Hopeful to go home today.    · Nutrition-Focused Physical Findings: very thin female  · Wound Type: None  · Current Nutrition Therapies:  · Oral Diet Orders: Cardiac   · Oral Diet intake: 1-25%, 26-50%  · Oral Nutrition Supplement (ONS) Orders: Frozen Oral Supplement  · Anthropometric Measures:  · Ht: 5' 1\" (154.9 cm)   · Current Body Wt: 83 lb (37.6 kg)  · Admission Body Wt: 84 lb (38.1 kg)  · Usual Body Wt: 89 lb (40.4 kg)  · % Weight Change: 6%; 20%,  6 weeks, 1.5 years  · Ideal Body Wt: 105 lb (47.6 kg),   · BMI Classification: BMI <18.5 Underweight    Estimated Intake vs Estimated Needs: Intake Less Than Needs    Nutrition Risk Level:

## 2017-09-20 NOTE — PLAN OF CARE
Problem: Nutrition  Goal: Optimal nutrition therapy  Outcome: Ongoing  Nutrition Problem: Severe malnutrition, in context of chronic illness  Intervention: Food and/or Nutrient Delivery: Continue current diet, Continue current ONS  Nutritional Goals: PO %. No new wt loss.

## 2017-09-21 ENCOUNTER — APPOINTMENT (OUTPATIENT)
Dept: GENERAL RADIOLOGY | Age: 82
DRG: 069 | End: 2017-09-21
Payer: MEDICARE

## 2017-09-21 ENCOUNTER — APPOINTMENT (OUTPATIENT)
Dept: CT IMAGING | Age: 82
DRG: 069 | End: 2017-09-21
Payer: MEDICARE

## 2017-09-21 PROBLEM — R07.9 CHEST PAIN: Status: ACTIVE | Noted: 2017-09-18

## 2017-09-21 PROBLEM — G45.9 TIA (TRANSIENT ISCHEMIC ATTACK): Status: ACTIVE | Noted: 2017-09-21

## 2017-09-21 LAB
ALBUMIN SERPL-MCNC: 3.5 G/DL (ref 3.5–5.2)
ALP BLD-CCNC: 74 U/L (ref 35–104)
ALT SERPL-CCNC: 20 U/L (ref 5–33)
ANION GAP SERPL CALCULATED.3IONS-SCNC: 14 MMOL/L (ref 7–19)
AST SERPL-CCNC: 27 U/L (ref 5–32)
BASE EXCESS ARTERIAL: -5.2 MMOL/L (ref -2–2)
BILIRUB SERPL-MCNC: 0.6 MG/DL (ref 0.2–1.2)
BUN BLDV-MCNC: 21 MG/DL (ref 8–23)
C-REACTIVE PROTEIN: 1.14 MG/DL (ref 0–0.5)
CALCIUM SERPL-MCNC: 8.9 MG/DL (ref 8.8–10.2)
CARBOXYHEMOGLOBIN ARTERIAL: 1.9 % (ref 0–5)
CHLORIDE BLD-SCNC: 94 MMOL/L (ref 98–111)
CO2: 20 MMOL/L (ref 22–29)
CREAT SERPL-MCNC: 0.8 MG/DL (ref 0.5–0.9)
GFR NON-AFRICAN AMERICAN: >60
GLUCOSE BLD-MCNC: 79 MG/DL (ref 74–109)
GLUCOSE BLD-MCNC: 99 MG/DL (ref 70–99)
HCO3 ARTERIAL: 18.8 MMOL/L (ref 22–26)
HCT VFR BLD CALC: 37.5 % (ref 37–47)
HEMOGLOBIN, ART, EXTENDED: 12 G/DL (ref 12–16)
HEMOGLOBIN: 12.5 G/DL (ref 12–16)
MAGNESIUM: 1.9 MG/DL (ref 1.6–2.4)
MCH RBC QN AUTO: 32.1 PG (ref 27–31)
MCHC RBC AUTO-ENTMCNC: 33.3 G/DL (ref 33–37)
MCV RBC AUTO: 96.2 FL (ref 81–99)
METHEMOGLOBIN ARTERIAL: 1.3 %
O2 CONTENT ARTERIAL: 17 ML/DL
O2 SAT, ARTERIAL: 97.2 %
O2 THERAPY: ABNORMAL
PCO2 ARTERIAL: 31 MMHG (ref 35–45)
PDW BLD-RTO: 13.2 % (ref 11.5–14.5)
PERFORMED ON: NORMAL
PH ARTERIAL: 7.39 (ref 7.35–7.45)
PLATELET # BLD: 209 K/UL (ref 130–400)
PMV BLD AUTO: 11.5 FL (ref 9.4–12.3)
PO2 ARTERIAL: 258 MMHG (ref 80–100)
POTASSIUM SERPL-SCNC: 5.1 MMOL/L (ref 3.5–5)
POTASSIUM, WHOLE BLOOD: 4.5
RBC # BLD: 3.9 M/UL (ref 4.2–5.4)
SEDIMENTATION RATE, ERYTHROCYTE: 16 MM/HR (ref 0–25)
SODIUM BLD-SCNC: 128 MMOL/L (ref 136–145)
TOTAL PROTEIN: 6.2 G/DL (ref 6.6–8.7)
TROPONIN: <0.01 NG/ML (ref 0–0.03)
WBC # BLD: 6.3 K/UL (ref 4.8–10.8)

## 2017-09-21 PROCEDURE — 83735 ASSAY OF MAGNESIUM: CPT

## 2017-09-21 PROCEDURE — 2700000000 HC OXYGEN THERAPY PER DAY

## 2017-09-21 PROCEDURE — 71010 XR CHEST PORTABLE: CPT

## 2017-09-21 PROCEDURE — 85652 RBC SED RATE AUTOMATED: CPT

## 2017-09-21 PROCEDURE — 84484 ASSAY OF TROPONIN QUANT: CPT

## 2017-09-21 PROCEDURE — 95816 EEG AWAKE AND DROWSY: CPT | Performed by: PSYCHIATRY & NEUROLOGY

## 2017-09-21 PROCEDURE — 36556 INSERT NON-TUNNEL CV CATH: CPT

## 2017-09-21 PROCEDURE — 6360000004 HC RX CONTRAST MEDICATION: Performed by: INTERNAL MEDICINE

## 2017-09-21 PROCEDURE — 02HV33Z INSERTION OF INFUSION DEVICE INTO SUPERIOR VENA CAVA, PERCUTANEOUS APPROACH: ICD-10-PCS | Performed by: FAMILY MEDICINE

## 2017-09-21 PROCEDURE — 86140 C-REACTIVE PROTEIN: CPT

## 2017-09-21 PROCEDURE — 84132 ASSAY OF SERUM POTASSIUM: CPT

## 2017-09-21 PROCEDURE — 6370000000 HC RX 637 (ALT 250 FOR IP): Performed by: FAMILY MEDICINE

## 2017-09-21 PROCEDURE — 85027 COMPLETE CBC AUTOMATED: CPT

## 2017-09-21 PROCEDURE — 2100000000 HC CCU R&B

## 2017-09-21 PROCEDURE — 82948 REAGENT STRIP/BLOOD GLUCOSE: CPT

## 2017-09-21 PROCEDURE — 92950 HEART/LUNG RESUSCITATION CPR: CPT

## 2017-09-21 PROCEDURE — 2580000003 HC RX 258: Performed by: FAMILY MEDICINE

## 2017-09-21 PROCEDURE — 93005 ELECTROCARDIOGRAM TRACING: CPT

## 2017-09-21 PROCEDURE — 93880 EXTRACRANIAL BILAT STUDY: CPT

## 2017-09-21 PROCEDURE — 36415 COLL VENOUS BLD VENIPUNCTURE: CPT

## 2017-09-21 PROCEDURE — 99233 SBSQ HOSP IP/OBS HIGH 50: CPT | Performed by: PSYCHIATRY & NEUROLOGY

## 2017-09-21 PROCEDURE — 99222 1ST HOSP IP/OBS MODERATE 55: CPT | Performed by: INTERNAL MEDICINE

## 2017-09-21 PROCEDURE — 71275 CT ANGIOGRAPHY CHEST: CPT

## 2017-09-21 PROCEDURE — 36556 INSERT NON-TUNNEL CV CATH: CPT | Performed by: FAMILY MEDICINE

## 2017-09-21 PROCEDURE — 6360000002 HC RX W HCPCS: Performed by: FAMILY MEDICINE

## 2017-09-21 PROCEDURE — 36600 WITHDRAWAL OF ARTERIAL BLOOD: CPT

## 2017-09-21 PROCEDURE — 80053 COMPREHEN METABOLIC PANEL: CPT

## 2017-09-21 PROCEDURE — 82803 BLOOD GASES ANY COMBINATION: CPT

## 2017-09-21 PROCEDURE — 95819 EEG AWAKE AND ASLEEP: CPT

## 2017-09-21 RX ORDER — OXYBUTYNIN CHLORIDE 5 MG/1
10 TABLET, EXTENDED RELEASE ORAL NIGHTLY
Status: DISCONTINUED | OUTPATIENT
Start: 2017-09-21 | End: 2017-09-26 | Stop reason: HOSPADM

## 2017-09-21 RX ORDER — ATORVASTATIN CALCIUM 20 MG/1
20 TABLET, FILM COATED ORAL NIGHTLY
Status: DISCONTINUED | OUTPATIENT
Start: 2017-09-21 | End: 2017-09-26 | Stop reason: HOSPADM

## 2017-09-21 RX ORDER — ASPIRIN 81 MG/1
81 TABLET, CHEWABLE ORAL DAILY
Status: DISCONTINUED | OUTPATIENT
Start: 2017-09-21 | End: 2017-09-25

## 2017-09-21 RX ORDER — NITROGLYCERIN 0.4 MG/1
0.4 TABLET SUBLINGUAL EVERY 5 MIN PRN
Status: DISCONTINUED | OUTPATIENT
Start: 2017-09-21 | End: 2017-09-26 | Stop reason: HOSPADM

## 2017-09-21 RX ORDER — PANTOPRAZOLE SODIUM 40 MG/1
40 TABLET, DELAYED RELEASE ORAL
Status: DISCONTINUED | OUTPATIENT
Start: 2017-09-21 | End: 2017-09-26 | Stop reason: HOSPADM

## 2017-09-21 RX ORDER — NITROGLYCERIN 0.4 MG/1
TABLET SUBLINGUAL
Status: DISCONTINUED
Start: 2017-09-21 | End: 2017-09-21 | Stop reason: WASHOUT

## 2017-09-21 RX ORDER — 0.9 % SODIUM CHLORIDE 0.9 %
500 INTRAVENOUS SOLUTION INTRAVENOUS ONCE
Status: DISCONTINUED | OUTPATIENT
Start: 2017-09-21 | End: 2017-09-22

## 2017-09-21 RX ORDER — LEVOTHYROXINE SODIUM 0.1 MG/1
100 TABLET ORAL DAILY
Status: DISCONTINUED | OUTPATIENT
Start: 2017-09-21 | End: 2017-09-26 | Stop reason: HOSPADM

## 2017-09-21 RX ORDER — CIPROFLOXACIN 250 MG/1
250 TABLET, FILM COATED ORAL 2 TIMES DAILY
Status: DISCONTINUED | OUTPATIENT
Start: 2017-09-21 | End: 2017-09-25

## 2017-09-21 RX ORDER — METOPROLOL SUCCINATE 25 MG/1
25 TABLET, EXTENDED RELEASE ORAL DAILY
Status: DISCONTINUED | OUTPATIENT
Start: 2017-09-21 | End: 2017-09-26 | Stop reason: HOSPADM

## 2017-09-21 RX ORDER — M-VIT,TX,IRON,MINS/CALC/FOLIC 27MG-0.4MG
1 TABLET ORAL DAILY
Status: DISCONTINUED | OUTPATIENT
Start: 2017-09-21 | End: 2017-09-26 | Stop reason: HOSPADM

## 2017-09-21 RX ORDER — DIPHENHYDRAMINE HCL 25 MG
25 TABLET ORAL EVERY 6 HOURS PRN
Status: DISCONTINUED | OUTPATIENT
Start: 2017-09-21 | End: 2017-09-26 | Stop reason: HOSPADM

## 2017-09-21 RX ORDER — PANTOPRAZOLE SODIUM 40 MG/10ML
40 INJECTION, POWDER, LYOPHILIZED, FOR SOLUTION INTRAVENOUS ONCE
Status: DISCONTINUED | OUTPATIENT
Start: 2017-09-21 | End: 2017-09-22

## 2017-09-21 RX ORDER — TOPIRAMATE 25 MG/1
25 TABLET ORAL DAILY
Status: DISCONTINUED | OUTPATIENT
Start: 2017-09-21 | End: 2017-09-26 | Stop reason: HOSPADM

## 2017-09-21 RX ORDER — MIDODRINE HYDROCHLORIDE 5 MG/1
5 TABLET ORAL 2 TIMES DAILY
Status: DISCONTINUED | OUTPATIENT
Start: 2017-09-21 | End: 2017-09-26 | Stop reason: HOSPADM

## 2017-09-21 RX ADMIN — OXYBUTYNIN CHLORIDE 10 MG: 5 TABLET, EXTENDED RELEASE ORAL at 20:08

## 2017-09-21 RX ADMIN — ATORVASTATIN CALCIUM 20 MG: 20 TABLET, FILM COATED ORAL at 20:08

## 2017-09-21 RX ADMIN — Medication 10 ML: at 20:07

## 2017-09-21 RX ADMIN — NITROGLYCERIN 0.4 MG: 0.4 TABLET SUBLINGUAL at 11:03

## 2017-09-21 RX ADMIN — MIDODRINE HYDROCHLORIDE 5 MG: 5 TABLET ORAL at 17:17

## 2017-09-21 RX ADMIN — IOPAMIDOL 90 ML: 755 INJECTION, SOLUTION INTRAVENOUS at 19:08

## 2017-09-21 RX ADMIN — ENOXAPARIN SODIUM 40 MG: 40 INJECTION SUBCUTANEOUS at 17:16

## 2017-09-21 RX ADMIN — PANTOPRAZOLE SODIUM 40 MG: 40 TABLET, DELAYED RELEASE ORAL at 17:17

## 2017-09-21 RX ADMIN — CIPROFLOXACIN HYDROCHLORIDE 250 MG: 250 TABLET, FILM COATED ORAL at 20:08

## 2017-09-21 ASSESSMENT — ENCOUNTER SYMPTOMS
SHORTNESS OF BREATH: 0
BACK PAIN: 0
COUGH: 0
VOMITING: 0
BLOOD IN STOOL: 0
SPUTUM PRODUCTION: 0
ABDOMINAL PAIN: 0
NAUSEA: 0
DIARRHEA: 0
HEMOPTYSIS: 0
CONSTIPATION: 0
BLURRED VISION: 1
DOUBLE VISION: 0

## 2017-09-21 ASSESSMENT — PAIN SCALES - GENERAL
PAINLEVEL_OUTOF10: 0

## 2017-09-21 NOTE — PROGRESS NOTES
Talked to [address] [place] [address] notified code blue was called on patient. Patient had pulse but not breathing. Sherren Le was previously aware that morning medications were not given due to nausea and EEG was in progress. Patient now in room 703.

## 2017-09-21 NOTE — CODE DOCUMENTATION
Patient complained of chest pain  \"its not hurting really bad, its just hurting though\"  Hurts in middle of chest. Pushing on it makes it hurt worse.

## 2017-09-21 NOTE — CONSULTS
organomegaly  Extremities: extremities normal, atraumatic, no cyanosis or edema  She has hammer toes      NEUROLOGIC EXAMINATION:  Neurologic Exam     Mental Status   Oriented to person, place, and time. Speech: speech is normal   Level of consciousness: alert  Able to name object. Able to repeat. Speech is fluent     Cranial Nerves     CN II   Visual fields full to confrontation. CN III, IV, VI   Pupils are equal, round, and reactive to light. CN V   Facial sensation intact. CN VII   Facial expression full, symmetric. CN VIII   Hearing: intact    CN IX, X   Palate: symmetric    CN XI   Right sternocleidomastoid strength: normal  Left sternocleidomastoid strength: normal  Right trapezius strength: normal  Left trapezius strength: normal    CN XII   Tongue: not atrophic  Fasciculations: absent  Tongue deviation: none       She has limited up and down gaze. She does have decent peripheral vision but poor central vision.        Motor Exam   Muscle bulk: decreased  Overall muscle tone: normal  Right arm pronator drift: absent  Left arm pronator drift: absent    Strength   Right neck flexion: 5/5  Left neck flexion: 5/5  Right neck extension: 5/5  Left neck extension: 5/5  Right deltoid: 5/5  Left deltoid: 5/5  Right biceps: 5/5  Left biceps: 5/5  Right triceps: 5/5  Left triceps: 5/5  Right wrist flexion: 5/5  Left wrist flexion: 5/5  Right wrist extension: 5/5  Left wrist extension: 5/5  Right interossei: 5/5  Left interossei: 5/5  Right iliopsoas: 5/5  Left iliopsoas: 5/5  Right quadriceps: 5/5  Left quadriceps: 5/5  Right glutei: 5/5  Left glutei: 5/5  Right anterior tibial: 5/5  Left anterior tibial: 5/5  Right posterior tibial: 5/5  Left posterior tibial: 5/5  Right peroneal: 5/5  Left peroneal: 5/5  Right gastroc: 5/5  Left gastroc: 5/5    Sensory Exam   Light touch normal.   Right arm vibration: normal  Left arm vibration: normal  Right leg vibration: decreased from ankle  Left leg vibration: decreased from ankle    Gait, Coordination, and Reflexes     Coordination   Finger to nose coordination: normal    Tremor   Resting tremor: absent  Intention tremor: absent  Action tremor: absent    Reflexes   Right brachioradialis: 1+  Left brachioradialis: 1+  Right biceps: 1+  Left biceps: 1+  Right triceps: 1+  Left triceps: 1+  Right patellar: 0  Left patellar: 0  Right achilles: 0  Left achilles: 0  Right plantar: normal  Left plantar: normal  Right Ojeda: absent  Left Ojeda: absent       Rapid alternating movements are intact       ADDITIONAL REVIEW:  CBC:    Recent Labs      09/18/17   0039  09/20/17   1804   WBC  7.8  5.1   HGB  10.3*  12.0   PLT  167  214     BMP:     Recent Labs      09/18/17   0039  09/20/17   1705  09/20/17   1804   NA  137   --   124*   K  3.0*   --   5.2*   CL  104   --   92*   CO2  20*   --   21*   BUN  17   --   21   CREATININE  0.8   --   1.0*   GLUCOSE  105  89  89     Hepatic:    Recent Labs      09/18/17   0039   AST  22   ALT  15   BILITOT  0.3   ALKPHOS  71     Troponin T:    Recent Labs      09/18/17   0046  09/18/17   0311  09/20/17   1901   TROPONINI  0.00  0.00  0.00     Pro-BNP:  No results for input(s): BNP in the last 72 hours. Lipids:    Recent Labs      09/19/17   0130   CHOL  126*   HDL  73     ABGs:    Lab Results   Component Value Date    PHART 7.530 01/05/2016    PO2ART 87.0 01/05/2016    DJN0NIC 24.0 01/05/2016     INR:    Recent Labs      09/18/17   0039  09/20/17   1735   INR  1.03  1.09     Narrative:        CT BRAIN without contrast 9/20/2017 4:00 PM  HISTORY: Stroke. Dysarthria and expressive aphasia. COMPARISON: 10/9/2008   DLP: 971 mGy cm. Automated exposure control was utilized to diminish  patient radiation dose. TECHNIQUE: Serial axial tomographic images of the brain were obtained  without the use of intravenous contrast.   FINDINGS:   The midline structures are nondisplaced.  There is mild cerebral and  cerebellar volume loss, with an associated increase in the prominence  of the ventricles and sulci. The basilar cisterns are normal in size  and configuration. There is no evidence of intracranial hemorrhage or  mass-effect. There is low attenuation in the periventricular white  matter, consistent with chronic ischemic change. There is evidence of  a previous infarct involving the right parietal centrum semiovale with  encephalomalacia. A focus of hypodensity within the high left  posterior parietal centrum semiovale is more difficult to clearly age  but I suspect is also old in nature. If there is a high clinical index  of suspicion for acute infarct follow-up with MRI could BE obtained  with diffusion. There are no abnormal extra-axial fluid collections. There is no evidence of tonsillar herniation. The included orbits and their contents are unremarkable. The  visualized paranasal sinuses, mastoid air cells and middle ear  cavities are clear. The visualized osseous structures and overlying  soft tissues of the skull and face are intact.      Impression:       Moderate cerebral and cerebellar volume loss with chronic  microvascular disease but no evidence of acute intracranial process. Suspected remote bilateral subcortical infarcts with focal  encephalomalacia.   Signed by Dr Denis Núñez on 9/20/2017 5:10 PM     Narrative         Transthoracic Echocardiography Report (TTE)     Demographics      Patient Name   Sin Pan of Study           09/19/2017      MRN            [id number]        Gender                  Female      Date of Birth  10/19/1933    Room Number             [id number]      Age            83 year(s)      Height:        61 inches     Referring Physician     Magalie Abraham      Weight:        89 pounds     Sonographer             Sara Mark BRET      BSA:           1.34 m^2      Interpreting Physician Sandy Calixto MD      BMI:           16.82 kg/m^2     Procedure    Type of Study      TTE procedure:ECHO NO CONTRAST WITH DOP/COLR.     Study Location: Echo Lab  Technical Quality: Adequate visualization    Patient Status: Inpatient    Indications:Chest pain.     Conclusions      Summary   Mitral valve leaflet mobility is normal .   Mild to moderate mitral regurgitation is present.  Flavio Mathis thickened aortic valve leaflets with preserved leaflet mobility.   Mild aortic regurgitation is noted.   Moderately dilated left atrium.   Moderate hypokinesis of the inferior segment with moderate impairment of   LV systolic function.   Moderate hypokinesis of the septal segment with moderate impairment of LV   systolic function.   Left ventricular ejection fraction is visually estimated at 45%.      Signature      ----------------------------------------------------------------   Electronically signed by Fidelia Mi MD(Interpreting   physician) on 09/20/2017 07:56 AM         IMPRESSION:  1. Transient ischemic attack. She had some waxing and waning aphasia vs. Confusion for a couple hours afterward. 2. Paroxysmal atrial fibrillation  3. Anemia  4. Weight loss  Given her age, weight loss, poor eyesight (blind), and risk of falls she is not a candidate for anticoagulation. She just started taking aspirin. The pAfib is likely the etiology of her TIA. PLAN:  1. Carotid US  2. EEG  3.  ASA 81 mg daily      Howard Loza M.D.

## 2017-09-21 NOTE — PROGRESS NOTES
Blood Gas, Arterial [[id number]] (Abnormal) Collected: 09/21/17 1126     Specimen: Blood gases Updated: 09/21/17 1127      pH, Arterial 7.390      pCO2, Arterial 31.0 (L) mmHg       pO2, Arterial 258.0 (H) mmHg       HCO3, Arterial 18.8 (L) mmol/L       Base Excess, Arterial -5.2 (L) mmol/L       Hemoglobin, Art, Extended 12.0 g/dL       O2 Sat, Arterial 97.2 %       Carboxyhgb, Arterial 1.9 %       Methemoglobin, Arterial 1.3 %       O2 Content, Arterial 17.0 mL/dL       O2 Therapy Unknown     Potassium, Whole Blood [[id number]] Collected: 09/21/17 1126      Updated: 09/21/17 1127      Potassium, Whole Blood 4.5     Sedimentation Rate [[id number]]    AT +  RR 24  RR  NRB  Code 99

## 2017-09-21 NOTE — PROGRESS NOTES
Bilateral Carotid Duplex performed. RT ICA: <50%            LT ICA: <50%     Bilateral Vertebral A. Antegrade flow at this time. This is a preliminary report. Final report pending.

## 2017-09-21 NOTE — H&P
[facility]  History and Physical    Patient:  Mackenzie Mixon  MRN: [id number]    CHIEF COMPLAINT:  CONFUSION      PCP: Kirti Valencia MD    HISTORY OF PRESENT ILLNESS:   The patient is a 80 y.o. female who presents with admitted from the ER with abrupt onset of altered mental status shortly after arrival home from hospitalization. Just discharged after workup for cp which resulted in negative lexiscan and discovery of new onset afib. Spont. Converted to NSR and decision to place on ASA given advanced age, and risk of fall being blind and living alone. Now appears may have had TIA per Dr Lucila Choudhary, neurology. REVIEW OF SYSTEMS:  Review of Systems    Past Medical History:      Diagnosis Date    Anxiety     GERD (gastroesophageal reflux disease)     Macular degeneration     Pneumonia     Weakness        Past Surgical History:      Procedure Laterality Date    CATARACT REMOVAL WITH IMPLANT  10/2015    right eye    CHOLECYSTECTOMY      COLON SURGERY      straighten kink    HIP SURGERY      right replacement       Medications Prior to Admission:    Prior to Admission medications    Medication Sig Start Date End Date Taking?  Authorizing Provider   metoprolol succinate (TOPROL XL) 25 MG extended release tablet Take 1 tablet by mouth daily 9/20/17   Kirti Valencia MD   aspirin 81 MG chewable tablet Take 1 tablet by mouth daily 9/20/17   Kirti Valencia MD   ciprofloxacin (CIPRO) 500 MG tablet Take 0.5 tablets by mouth 2 times daily for 10 days 9/20/17 9/30/17  Kirti Valencia MD   acetaminophen 650 MG TABS Take 650 mg by mouth every 4 hours as needed 1/18/16   Kirti Valencia MD   omeprazole (PRILOSEC) 20 MG capsule Take 20 mg by mouth 2 times daily    Historical Provider, MD   midodrine (PROAMATINE) 2.5 MG tablet Take 5 mg by mouth 2 times daily     Historical Provider, MD   levothyroxine (SYNTHROID) 100 MCG tablet Take 100 mcg by mouth Daily    Historical Provider, MD   topiramate Chest Portable    Result Date: 9/20/2017  XR CHEST PORTABLE 9/20/2017 4:00 PM HISTORY: Dysarthria COMPARISON: Exam dated 1/24/2016. FINDINGS: Stable fibrotic changes in both lungs. No superimposed focal lung infiltrate is identified. The cardiomediastinal silhouette and pulmonary vascularity are within normal limits. The osseous structures and surrounding soft tissues demonstrate no acute abnormality. 1. Overall stable exam. No visualized acute cardiopulmonary process. Signed by Dr Loly Morley on 9/20/2017 5:22 PM      Assessment and Plan   1. Probable TIA  2. HTN  3. PAF  4.  RECURRENT CP  5. ADV AGE  6.   LEGALLY BLIND      PLAN:    Linette Stinson M.D.  9/21/2017

## 2017-09-21 NOTE — PROGRESS NOTES
Dr. Claudine Bazzi office notified of changes in today's EKG and previous from prior admission. Dr. Shaw Reason aware of notification.  Electronically signed by Allison Cr RN on 9/21/2017 at 8:27 AM

## 2017-09-21 NOTE — PROGRESS NOTES
[address], [address]  [place], [place]  Phone [phone]     Neurology Progress Note  2017 8:37 AM  Information:   Patient Name: Gay Ortiz  :   10/19/1933  Age:   80 y.o. MRN:   [id number]  Account #:  [de-identified]  Admit Date:   2017  Today:  17     ADMIT DX:   TIA    Subjective:     Gay Ortiz is a 80y.o. year old woman with a history of paoxysmal atrial fibrillation who was admitted  with dysarthria and expressive aphasia after just being discharged that day after a negative chest pain evaluation. Her symptoms lasted about 20 minutes and the waxed and waned for a few hours finally resolving. Interval History:   She has had no further TIAs. She did have chest pain just a few minutes ago. She denies dyspnea and nausea. She feels better now. Objective:     Past Medical History:  Past Medical History:   Diagnosis Date    Anxiety     GERD (gastroesophageal reflux disease)     Macular degeneration     Pneumonia     Weakness        Past Surgical History:   Procedure Laterality Date    CATARACT REMOVAL WITH IMPLANT  10/2015    right eye    CHOLECYSTECTOMY      COLON SURGERY      straighten kink    HIP SURGERY      right replacement       Family History   Problem Relation Age of Onset    Heart Disease Mother     Stroke Mother     Heart Disease Father     Heart Disease Sister     Obesity Sister     No Known Problems Brother        Social History     Social History    Marital status:      Spouse name: N/A    Number of children: N/A    Years of education: N/A     Occupational History    Not on file.      Social History Main Topics    Smoking status: Never Smoker    Smokeless tobacco: Not on file    Alcohol use No    Drug use: No    Sexual activity: Not on file     Other Topics Concern    Not on file     Social History Narrative       Medications:      pantoprazole  40 mg Oral BID AC    midodrine  5 mg Oral BID   

## 2017-09-21 NOTE — CODE DOCUMENTATION
Labs verbally ordered by Kim Mejía NP.  Electronically signed by Nelli Elliott RN on 9/21/2017 at 8:30 AM

## 2017-09-21 NOTE — PROGRESS NOTES
Pt arrived to room 703 post code blue. Pt placed on bedside monitor. Pt alert, VSS, Dr Gustavo Silva currently at bedside placing central line. Pt's daughter gave verbal consent for line placement. Dr Nestor Avilez notified of code blue by pts nurse on 5th floor.

## 2017-09-21 NOTE — CODE DOCUMENTATION
EKG reviewed by Osmin Brewster NP and Dr. German Phelps. Dr. Edith Vargas notified and in route.  Electronically signed by Adrian Almaguer RN on 9/21/2017 at 8:34 AM

## 2017-09-21 NOTE — PROGRESS NOTES
Pharmacy Renal Adjustment    Ju Chase is a 80 y.o. female. Pharmacy has renally adjusted medications per protocol. Recent Labs      09/20/17   1804   BUN  21       Recent Labs      09/20/17   1804   CREATININE  1.0*       CrCl cannot be calculated (Unknown ideal weight.). CrCL calculates to 25.6 ml/min per C&G using actual body weight    Height:   Ht Readings from Last 1 Encounters:   09/20/17 5' 1\" (1.549 m)     Weight:  Wt Readings from Last 1 Encounters:   09/20/17 84 lb (38.1 kg)       Plan: Adjust the following medications based on renal function:           Lovenox to 1mg/kg (40mg) sq q 24hr.     Electronically signed by Leif Muñoz, [address] [place] [address] on 9/21/2017 at 8:47 AM

## 2017-09-21 NOTE — PROGRESS NOTES
Call placed to Dr Junior Yung. Family had stated pt allergic to Rocephin. Had reaction before discharge yesterday. Broke out in rash. Dr Junior Yung called back within 2 min and order to DC Rocephin received.  Electronically signed by Gunnar Small RN on 9/21/2017 at 5:53 PM

## 2017-09-21 NOTE — CONSULTS
Syncope and collapse        * (Principal)TIA (transient ischemic attack)                PRIOR CARDIAC PROBLEM LIST  (IF APPLICABLE):    0/77/2261 NM [facility]:  grossly negative for the presence of ischemia or infarction with normal wall motion and ejection fraction at rest    9/19/2017 TTE:  Mitral valve leaflet mobility is normal. Mild to moderate mitral regurgitation is present. Mildly thickened aortic valve leaflets with preserved leaflet mobility. Mild aortic regurgitation is noted. Moderately dilated left atrium. Moderate hypokinesis of the inferior segment with moderate impairment of LV systolic function. Moderate hypokinesis of the septal segment with moderate impairment of LV systolic function. Left ventricular ejection fraction is visually estimated at 45%. Past Medical History:    Past Medical History:   Diagnosis Date    Anxiety     GERD (gastroesophageal reflux disease)     Macular degeneration     Pneumonia     Weakness          Past Surgical History:    Past Surgical History:   Procedure Laterality Date    CATARACT REMOVAL WITH IMPLANT  10/2015    right eye    CHOLECYSTECTOMY      COLON SURGERY      straighten kink    HIP SURGERY      right replacement         Home Medications:   Prior to Admission medications    Medication Sig Start Date End Date Taking?  Authorizing Provider   metoprolol succinate (TOPROL XL) 25 MG extended release tablet Take 1 tablet by mouth daily 9/20/17   Alfie Morris MD   aspirin 81 MG chewable tablet Take 1 tablet by mouth daily 9/20/17   Alfie Morris MD   ciprofloxacin (CIPRO) 500 MG tablet Take 0.5 tablets by mouth 2 times daily for 10 days 9/20/17 9/30/17  Alfie Morris MD   acetaminophen 650 MG TABS Take 650 mg by mouth every 4 hours as needed 1/18/16   Alfie Morris MD   omeprazole (PRILOSEC) 20 MG capsule Take 20 mg by mouth 2 times daily    Historical Provider, MD   midodrine (PROAMATINE) 2.5 MG tablet Take 5 mg by mouth 2 times 87  Temp 97.3 °F (36.3 °C) (Temporal)   Resp 20  Ht 5' 1\" (1.549 m)  Wt 84 lb (38.1 kg)  SpO2 97%  BMI 15.87 kg/m2    GENERAL - chronically ill, weak, in no amount of generalized distress  HEENT   PERRLA, Hearing appears normal, conjunctiva and lids are normal, ears and nose appear normal  NECK - no thyromegaly, no JVD, trachea is in the midline  CARDIOVASCULAR  PMI is in the left mid line clavicular position, Normal S1 and S2 with a grade 2/6 systolic murmur. No S3 or S4    PULMONARY   No respiratory distress. No wheezes and rales.    ABDOMEN   soft, non tender, no rebound, no hepatomegaly or splenomegaly  MUSCULOSKELETAL   Sitting, digitals and nails are without clubbing or cyanosis  EXTREMITIES - No edema  NEUROLOGIC - cranial nerves, II-XII, are normal  SKIN - turgor is normal, no rash  PSYCHIATRIC - normal mood and affect, alert and orientated x 3, judgement and insight appear appropriate      LABORATORY EVALUATION & TESTING:    I have personally reviewed and interpreted the results of the following diagnostic testing      EKG and or Telemetry:  which was personally reviewed me:  Sinus rhythm, 87 bpm,  without Acute changes    Troponin:  negative myocardial necrosis     CBC:   Recent Labs      09/20/17   1804  09/21/17   0810   WBC  5.1  6.3   HGB  12.0  12.5   HCT  36.4*  37.5   MCV  97.8  96.2   PLT  214  209     BMP:   Recent Labs      09/20/17   1804  09/21/17   0810   NA  124*  128*   K  5.2*  5.1*   CL  92*  94*   CO2  21*  20*   BUN  21  21   CREATININE  1.0*  0.8     Cardiac Enzymes:   Recent Labs      09/20/17   1901  09/21/17   0810   TROPONINI  0.00  <0.01     PT/INR:   Recent Labs      09/20/17   1735   PROTIME  14.0   INR  1.09     APTT:   Recent Labs      09/20/17   1735   APTT  30.5     Liver Profile:  Lab Results   Component Value Date    AST 27 09/21/2017    ALT 20 09/21/2017    BILITOT 0.6 09/21/2017    ALKPHOS 74 09/21/2017    ALKPHOS 105 01/05/2012     Lab Results   Component Value Date    CHOL 126 09/19/2017    HDL 73 09/19/2017    TRIG 48 09/19/2017     TSH:  Lab Results   Component Value Date    TSH 0.152 09/19/2017     UA:   Lab Results   Component Value Date    COLORU YELLOW 09/18/2017    PHUR 5.5 09/18/2017    WBCUA 21 09/18/2017    RBCUA 3 09/18/2017    BACTERIA NEGATIVE 09/18/2017    CLARITYU CLOUDY 09/18/2017    SPECGRAV 1.011 09/18/2017    LEUKOCYTESUR MODERATE 09/18/2017    UROBILINOGEN 0.2 09/18/2017    BILIRUBINUR Negative 09/18/2017    BILIRUBINUR NEGATIVE 01/05/2012    BLOODU Negative 09/18/2017    GLUCOSEU Negative 09/18/2017             ALL THE CARDIOLOGY PROBLEMS ARE LISTED ABOVE; HOWEVER, THE FOLLOWING SPECIFIC CARDIAC PROBLEMS WERE ADDRESSED AND TREATED DURING THE HOSPITAL VISIT TODAY:       Cardiac Specific Problem / Diagnosis  Discussion / Medical Decision Making Plan          1. Chest discomfort Initial encounter   Discomfort is reproducible by touch    9/18/2017 NM Lexiscan:  grossly negative for the presence of ischemia or infarction with normal wall motion and ejection fraction at rest    9/19/2017 TTE:  Mitral valve leaflet mobility is normal. Mild to moderate mitral regurgitation is present. Mildly thickened aortic valve leaflets with preserved leaflet mobility. Mild aortic regurgitation is noted. Moderately dilated left atrium. Moderate hypokinesis of the inferior segment with moderate impairment of LV systolic function. Moderate hypokinesis of the septal segment with moderate impairment of LV systolic function. Left ventricular ejection fraction is visually estimated at 45%. Monitor telemetry. Serial Troponin. Check CRP and Sed Rate. Check bubble study. CTA chest pending. 2. Paroxysmal Atrial Fibrillation Initial presentation during this evaluation Currently NSR  Continue Toprol XL for rate control and ASA. 3. Hypertension Initial presentation during this evaluation Toprol XL  SBP min 101 max 160  DBP min 63 max 78 Monitor.     4. Valvular heart disease  5. ? TIA    PLAN:    1. Continue present medications except for changes as noted above  2. Continue to monitor rhythm  3. Further orders per clinical course. 4. CRP, Sed rate, Bubble Study           Discussed with patient and family and nursing. I greatly appreciate the opportunity and your confidence in allowing me to participate in the care of Glenora Rubinstein    Electronically signed by Russell Willis CNP on 9/21/17     [address] [facility]    ______________________________________________________________________  I have independently interviewed and examined Glenora Rubinstein. I have discussed arceo elements of the care plan with the APRN and I agree with the findings and care plan as stated above unless otherwise noted. Cardiac Specific Problems:    Specialty Problems        Cardiology Problems    Paroxysmal atrial fibrillation (HCC)        Hypotension        Syncope and collapse        * (Principal)TIA (transient ischemic attack)                Subjective:      Chest discomfort    Objective:     GENERAL - well developed and well nourished    HEENT   PERRLA, Hearing appears normal, conjunctiva and lids are normal, ears and nose appear normal  NECK - no thyromegaly, no JVD, trachea is in the midline  CARDIOVASCULAR  PMI is in the mid line clavicular position, Normal S1 and S2 with a grade 1/6 systolic murmur. No S3 or S4    PULMONARY  no respiratory distress. no wheezes or rales.  Lungs are clear to ausculation   ABDOMEN   soft, non tender, no rebound, no hepatomegaly or splenomegaly  MUSCULOSKELETAL   gait and station are normal, digitals and nails are without clubbing or cyanosis  EXTREMITIES - no edema  NEUROLOGIC - cranial nerves, 2-12, are normal  SKIN - turgor is normal, no rash  PSYCHIATRIC - normal mood and affect, alert and orientated x 3, judgement and insight appear appropriate    I examined the patient for these specific

## 2017-09-21 NOTE — PROGRESS NOTES
Received call from Dr. Alvino Ayala. Notified him of patient's new chest pain complaint with difficulty catching a deep breath. Pain is reported more when chest is pressed and assessed. New symptoms presented after patient yawned per daughter at bedside. Waiting new orders.  Electronically signed by Long Ralph RN on 9/21/2017 at 9:06 AM

## 2017-09-22 LAB
EKG P AXIS: 76 DEGREES
EKG P AXIS: 76 DEGREES
EKG P-R INTERVAL: 180 MS
EKG P-R INTERVAL: 188 MS
EKG Q-T INTERVAL: 366 MS
EKG Q-T INTERVAL: 382 MS
EKG QRS DURATION: 102 MS
EKG QRS DURATION: 106 MS
EKG QTC CALCULATION (BAZETT): 420 MS
EKG QTC CALCULATION (BAZETT): 420 MS
EKG T AXIS: 73 DEGREES
EKG T AXIS: 84 DEGREES
GLUCOSE BLD-MCNC: 83 MG/DL (ref 70–99)
PERFORMED ON: NORMAL
TROPONIN: <0.01 NG/ML (ref 0–0.03)

## 2017-09-22 PROCEDURE — 99232 SBSQ HOSP IP/OBS MODERATE 35: CPT | Performed by: INTERNAL MEDICINE

## 2017-09-22 PROCEDURE — 99233 SBSQ HOSP IP/OBS HIGH 50: CPT | Performed by: PSYCHIATRY & NEUROLOGY

## 2017-09-22 PROCEDURE — 84484 ASSAY OF TROPONIN QUANT: CPT

## 2017-09-22 PROCEDURE — 6370000000 HC RX 637 (ALT 250 FOR IP): Performed by: FAMILY MEDICINE

## 2017-09-22 PROCEDURE — 2580000003 HC RX 258: Performed by: FAMILY MEDICINE

## 2017-09-22 PROCEDURE — 2140000000 HC CCU INTERMEDIATE R&B

## 2017-09-22 PROCEDURE — 82948 REAGENT STRIP/BLOOD GLUCOSE: CPT

## 2017-09-22 PROCEDURE — 6360000002 HC RX W HCPCS: Performed by: FAMILY MEDICINE

## 2017-09-22 PROCEDURE — 2500000003 HC RX 250 WO HCPCS: Performed by: INTERNAL MEDICINE

## 2017-09-22 RX ORDER — METOPROLOL TARTRATE 5 MG/5ML
2.5 INJECTION INTRAVENOUS EVERY 6 HOURS PRN
Status: DISCONTINUED | OUTPATIENT
Start: 2017-09-22 | End: 2017-09-22

## 2017-09-22 RX ORDER — METOPROLOL TARTRATE 5 MG/5ML
2.5 INJECTION INTRAVENOUS EVERY 6 HOURS PRN
Status: DISCONTINUED | OUTPATIENT
Start: 2017-09-22 | End: 2017-09-26 | Stop reason: HOSPADM

## 2017-09-22 RX ADMIN — METOPROLOL SUCCINATE 25 MG: 25 TABLET, EXTENDED RELEASE ORAL at 08:56

## 2017-09-22 RX ADMIN — MIDODRINE HYDROCHLORIDE 5 MG: 5 TABLET ORAL at 09:01

## 2017-09-22 RX ADMIN — ATORVASTATIN CALCIUM 20 MG: 20 TABLET, FILM COATED ORAL at 21:12

## 2017-09-22 RX ADMIN — MULTIPLE VITAMINS W/ MINERALS TAB 1 TABLET: TAB at 09:01

## 2017-09-22 RX ADMIN — ENOXAPARIN SODIUM 40 MG: 40 INJECTION SUBCUTANEOUS at 06:03

## 2017-09-22 RX ADMIN — CIPROFLOXACIN HYDROCHLORIDE 250 MG: 250 TABLET, FILM COATED ORAL at 09:01

## 2017-09-22 RX ADMIN — METOPROLOL TARTRATE 2.5 MG: 5 INJECTION INTRAVENOUS at 08:30

## 2017-09-22 RX ADMIN — TOPIRAMATE 25 MG: 25 TABLET ORAL at 09:01

## 2017-09-22 RX ADMIN — Medication 10 ML: at 21:12

## 2017-09-22 RX ADMIN — LEVOTHYROXINE SODIUM 100 MCG: 100 TABLET ORAL at 06:01

## 2017-09-22 RX ADMIN — ASPIRIN 81 MG CHEWABLE TABLET 81 MG: 81 TABLET CHEWABLE at 09:01

## 2017-09-22 RX ADMIN — PANTOPRAZOLE SODIUM 40 MG: 40 TABLET, DELAYED RELEASE ORAL at 06:01

## 2017-09-22 RX ADMIN — OXYBUTYNIN CHLORIDE 10 MG: 5 TABLET, EXTENDED RELEASE ORAL at 21:12

## 2017-09-22 RX ADMIN — DIPHENHYDRAMINE HCL 25 MG: 25 TABLET ORAL at 21:12

## 2017-09-22 RX ADMIN — CIPROFLOXACIN HYDROCHLORIDE 250 MG: 250 TABLET, FILM COATED ORAL at 21:12

## 2017-09-22 ASSESSMENT — PAIN SCALES - GENERAL
PAINLEVEL_OUTOF10: 0
PAINLEVEL_OUTOF10: 0

## 2017-09-22 NOTE — PROGRESS NOTES
Follow up visit:  Pt is awake, speaks but becomes tearful easily. Says, \"I don't want to be out here in the parking lot. \"   Reoriented and reassured pt. Denies pain. Says she wants to go home. Met with her son and daughter at bedside, offered support. Reviewed goals. Family expects pt will require NH placement and she has been at [facility] in the past. This will be their preference. Listening presence.   Offered support, will follow POC    Electronically signed by Scott Fernandez RN on 9/22/2017 at 2:10 PM

## 2017-09-22 NOTE — PROGRESS NOTES
History    Not on file.      Social History Main Topics    Smoking status: Never Smoker    Smokeless tobacco: Not on file    Alcohol use No    Drug use: No    Sexual activity: Not on file     Other Topics Concern    Not on file     Social History Narrative       Labs:  Hematology:  Recent Labs      17   1735  17   1804  17   0810   WBC   --   5.1  6.3   HGB   --   12.0  12.5   HCT   --   36.4*  37.5   PLT   --   214  209   SEDRATE   --    --   16   CRP   --    --   1.14*   INR  1.09   --    --      Chemistry:  Recent Labs      17   1705  17   1804  17   1901  17   0810  17   1126  17   0505   NA   --   124*   --   128*   --    --    K   --   5.2*   --   5.1*  4.5   --    CL   --   92*   --   94*   --    --    CO2   --   21*   --   20*   --    --    GLUCOSE  89  89   --   79   --    --    BUN   --   21   --   21   --    --    CREATININE   --   1.0*   --   0.8   --    --    MG   --    --    --   1.9   --    --    ANIONGAP   --   11   --   14   --    --    LABGLOM   --   53*   --   >60   --    --    CALCIUM   --   9.1   --   8.9   --    --    TROPONINI   --    --   0.00  <0.01   --   <0.01     Recent Labs      17   0810   PROT  6.2*   LABALBU  3.5   AST  27   ALT  20   ALKPHOS  74   BILITOT  0.6       Objective:     Vitals: BP (!) 127/47  Pulse 92  Temp 99.2 °F (37.3 °C) (Temporal)   Resp 26  Ht 5' 1\" (1.549 m)  Wt 84 lb (38.1 kg)  SpO2 100%  BMI 15.87 kg/m2     Intake/Output Summary (Last 24 hours) at 17 0837  Last data filed at 17 0400   Gross per 24 hour   Intake             1950 ml   Output              550 ml   Net             1400 ml    Temp (24hrs), Av.6 °F (37 °C), Min:97.3 °F (36.3 °C), Max:99.5 °F (37.5 °C)    Physical Examination:   General appearance - alert, well appearing, and in no distress and oriented to person, place, and time  Mental status - alert, oriented to person, place, and time, normal mood, behavior, speech, dress, motor activity, and thought processes  Eyes - pupils equal and reactive, extraocular eye movements intact  Ears - bilateral TM's and external ear canals normal, hearing grossly normal bilaterally  Mouth - mucous membranes moist, pharynx normal without lesions  Neck - supple, no significant adenopathy  Lymphatics - no palpable lymphadenopathy, no hepatosplenomegaly  Chest - clear to auscultation, no wheezes, rales or rhonchi, symmetric air entry, no tachypnea, retractions or cyanosis  Heart - normal rate, regular rhythm, normal S1, S2, no murmurs, rubs, clicks or gallops  Abdomen - soft, nontender, nondistended, no masses or organomegaly bowel sounds normal  Neurological - alert, oriented, normal speech, no focal findings or movement disorder noted  Musculoskeletal - no joint tenderness, deformity or swelling  Extremities - peripheral pulses normal, no pedal edema, no clubbing or cyanosis  Skin - normal coloration and turgor, no rashes, no suspicious skin lesions noted      Assessment and Plan:     Primary Problem:  TIA (transient ischemic attack)  NEW ONSET AFIB  [facility]    Hospital Problem list:  Principal Problem:    TIA (transient ischemic attack)  Active Problems:    Paroxysmal atrial fibrillation (HCC)    Chest pain    Dysarthria    Hemispheric carotid artery syndrome    Weight loss, unintentional      PMH:  Past Medical History:   Diagnosis Date    Anxiety     GERD (gastroesophageal reflux disease)     Macular degeneration     Palliative care encounter     Pneumonia     Weakness        Treatment Plan:  LEXISCAN - NEGATIVE FOR ISCHEMIA  PAF - PER CARDIOLOGY-NSR CURRENTLY  CTA- NEGATIVE  NEURO WORKUP ONGOING  DECISION ON ANTICOAGULATION PER CARDS/NEURO    Discharge planning:   Home    Reviewed treatment plans with the patient and/or family. 20 minutes spent in face to face interaction and coordination of care.      Electronically signed by Sruthi Everett MD on 9/22/2017 at 8:37 AM

## 2017-09-22 NOTE — PROGRESS NOTES
[address], [address]  [address] [facility], [place] [address]  Phone [phone]     Neurology Progress Note  2017 12:00 PM  Information:   Patient Name: Mariah Rachel  :   10/19/1933  Age:   80 y.o. MRN:   [id number]  Account #:  [de-identified]  Admit Date:   2017  Today:  17     ADMIT DX:   TIA (transient ischemic attack)    Subjective:     Alexi Valencia a 80 y. o. year old woman with a history of paoxysmal atrial fibrillation who was admitted  with dysarthria and expressive aphasia after just being discharged that day after a negative chest pain evaluation. Her symptoms lasted about 20 minutes and the waxed and waned for a few hours finally resolving. Interval History:   She had a spell of diminished responsiveness yesterday. Family says she stopped breathing. No documented hypoxemia. She has since been in the CCU. She has no chest pain or dyspnea currently. She has had pAfib on monitor. Objective:     Past Medical History:  Past Medical History:   Diagnosis Date    Anxiety     GERD (gastroesophageal reflux disease)     Macular degeneration     Palliative care encounter     Pneumonia     Weakness        Past Surgical History:   Procedure Laterality Date    CATARACT REMOVAL WITH IMPLANT  10/2015    right eye    CHOLECYSTECTOMY      COLON SURGERY      straighten kink    HIP SURGERY      right replacement       Family History   Problem Relation Age of Onset    Heart Disease Mother     Stroke Mother     Heart Disease Father     Heart Disease Sister     Obesity Sister     No Known Problems Brother        Social History     Social History    Marital status:      Spouse name: N/A    Number of children: N/A    Years of education: N/A     Occupational History    Not on file.      Social History Main Topics    Smoking status: Never Smoker    Smokeless tobacco: Not on file    Alcohol use No    Drug use: No    Sexual activity: Not on file     Other Topics

## 2017-09-22 NOTE — PLAN OF CARE
Problem: Falls - Risk of  Goal: Absence of falls  Outcome: Met This Shift    Problem: Risk for Impaired Skin Integrity  Goal: Tissue integrity - skin and mucous membranes  Structural intactness and normal physiological function of skin and  mucous membranes.    Outcome: Ongoing

## 2017-09-22 NOTE — PROGRESS NOTES
[address]    Daily HOSPITAL Progress Note                            Date:  9/22/17  Patient: Missy Mccrary  Admission:  9/20/2017  4:40 PM  Admit DX: Dysarthria [R47.1]  Age:  80 y. o., 10/19/1933     LOS: 2 days       Reason for initial evaluation or the patient's initial complaint    Syncopial episode      SUBJECTIVE:      9/20/2017    Chief Complaint / Reason for the Visit   Follow up of:  syncope and PAF and hypertension      Specialty Problems        Cardiology Problems    Paroxysmal atrial fibrillation (HCC)        Hypotension        Syncope and collapse        Hemispheric carotid artery syndrome        * (Principal)TIA (transient ischemic attack)              Current Status Today According to the patient:  \"feels a little better\"    Subjective:  Ms. Missy Mccrary is generally feeling gradually improving. More alert, but still confused    Ms. Missy Mccrary has the following cardiac complaints / symptoms today:    1. syncope, no further episodes    2.  PAF, had an episode this am and converted with lopressor    3. hypertension, Is stable on current medications         Missy Mccrary is a 80 y.o. female with the following history as recorded in EpicCare:    Patient Active Problem List    Diagnosis Date Noted    Paroxysmal atrial fibrillation [facility])      Priority: High    Transient alteration of awareness      Priority: Low    TIA (transient ischemic attack) 09/21/2017     Priority: Low    Dysarthria      Priority: Low    Hemispheric carotid artery syndrome      Priority: Low    Weight loss, unintentional      Priority: Low    Chest pain 09/18/2017     Priority: Low    Syncope and collapse 01/25/2016     Priority: Low    Hypotension 01/25/2016     Priority: Low    Dermatitis 01/25/2016     Priority: Low    Anemia 01/25/2016     Priority: Low    UTI (urinary tract infection)      Priority: Low    SIADH (syndrome of inappropriate ADH production) ([facility] 01/09/2016     Priority: Low  CAP (community acquired pneumonia) 01/05/2016     Priority: Low    Generalized weakness 01/05/2016     Priority: Low    Hyponatremia 01/05/2016     Priority: Low     Current Facility-Administered Medications   Medication Dose Route Frequency Provider Last Rate Last Dose    enoxaparin (LOVENOX) injection 40 mg  1 mg/kg Subcutaneous Q12H Nii German MD   40 mg at 09/22/17 0603    metoprolol (LOPRESSOR) injection 2.5 mg  2.5 mg Intravenous Q6H PRN Enzo Huntley MD   2.5 mg at 09/22/17 0830    diphenhydrAMINE (BENADRYL) tablet 25 mg  25 mg Oral Q6H PRN Belle Hairston MD        pantoprazole (PROTONIX) tablet 40 mg  40 mg Oral BID AC Nii German MD   40 mg at 09/22/17 0601    midodrine (PROAMATINE) tablet 5 mg  5 mg Oral BID Nii German MD   5 mg at 09/22/17 0901    levothyroxine (SYNTHROID) tablet 100 mcg  100 mcg Oral Daily Nii German MD   100 mcg at 09/22/17 0601    topiramate (TOPAMAX) tablet 25 mg  25 mg Oral Daily Nii German MD   25 mg at 09/22/17 0901    atorvastatin (LIPITOR) tablet 20 mg  20 mg Oral Nightly Nii German MD   20 mg at 09/21/17 2008    oxybutynin (DITROPAN-XL) extended release tablet 10 mg  10 mg Oral Nightly Nii German MD   10 mg at 09/21/17 2008    therapeutic multivitamin-minerals 1 tablet  1 tablet Oral Daily Nii German MD   1 tablet at 09/22/17 0901    metoprolol succinate (TOPROL XL) extended release tablet 25 mg  25 mg Oral Daily Nii German MD   25 mg at 09/22/17 0856    aspirin chewable tablet 81 mg  81 mg Oral Daily Nii German MD   81 mg at 09/22/17 0901    ciprofloxacin (CIPRO) tablet 250 mg  250 mg Oral BID Nii German MD   250 mg at 09/22/17 0901    pantoprazole (PROTONIX) injection 40 mg  40 mg Intravenous Once Nii German MD        nitroGLYCERIN (NITROSTAT) SL tablet 0.4 mg  0.4 mg Sublingual Q5 Min PRN Nii German MD   0.4 mg at 09/21/17 1103    0.9 % sodium chloride bolus  500 mL Intravenous Once Shante Portillo  mL/hr at 09/21/17 1200 500 mL at 09/21/17 1200    sodium chloride flush 0.9 % injection 10 mL  10 mL Intravenous 2 times per day Cathryn Houston MD   10 mL at 09/21/17 2007    sodium chloride flush 0.9 % injection 10 mL  10 mL Intravenous PRN Cathryn Houston MD        acetaminophen (TYLENOL) tablet 650 mg  650 mg Oral Q4H PRN Cathryn Houston MD         Allergies: Ceftriaxone  Past Medical History:   Diagnosis Date    Anxiety     GERD (gastroesophageal reflux disease)     Macular degeneration     Palliative care encounter     Pneumonia     Weakness      Past Surgical History:   Procedure Laterality Date    CATARACT REMOVAL WITH IMPLANT  10/2015    right eye    CHOLECYSTECTOMY      COLON SURGERY      straighten kink    HIP SURGERY      right replacement     Family History   Problem Relation Age of Onset    Heart Disease Mother     Stroke Mother     Heart Disease Father     Heart Disease Sister     Obesity Sister     No Known Problems Brother      Social History   Substance Use Topics    Smoking status: Never Smoker    Smokeless tobacco: Not on file    Alcohol use No          Review of Systems:    General:      Complaint / Symptom Yes / No / Description if Yes       Fatigue No   Weight gain No   Insomnia No       Respiratory:        Complaint / Symptom Yes / No / Description if Yes       Cough No   Horseness No       Cardiovascular:    Complaint / Symptom Yes / No / Description if Yes       Chest Pain No   Shortness of Air / Orthopnea Yes: stable   Presyncope / Syncope No   Palpitations Yes: transient AF this am         Objective:    BP (!) 143/65  Pulse 81  Temp 98.2 °F (36.8 °C)  Resp 19  Ht 5' 1\" (1.549 m)  Wt 84 lb (38.1 kg)  SpO2 99%  BMI 15.87 kg/m2    GENERAL - well developed and well nourished    HEENT   PERRLA, Hearing appears normal, conjunctiva and lids are normal, ears and nose appear normal  NECK - no thyromegaly, no JVD, trachea is in the midline  CARDIOVASCULAR  PMI is in the left mid line clavicular position, Normal S1 and S2 with a grade 1/6 systolic murmur. No S3 or S4    PULMONARY  No respiratory distress. scattered wheezes and rales. ABDOMEN   soft, non tender, no rebound, no hepatomegaly or splenomegaly  MUSCULOSKELETAL   Prone/Supine, digitals and nails are without clubbing or cyanosis  EXTREMITIES - trace edema  NEUROLOGIC - cranial nerves, II-XII, are normal  SKIN - turgor is normal, no rash  PSYCHIATRIC - normal mood and affect, alert and orientated x 3, judgement and insight appear appropriate      ASSESSMENT:    ALL THE CARDIOLOGY PROBLEMS ARE LISTED ABOVE; HOWEVER, THE FOLLOWING SPECIFIC CARDIAC PROBLEMS / CONDITIONS WERE ADDRESSED AND TREATED DURING THE OFFICE VISIT TODAY:                                                                                            MEDICAL DECISION MAKING             Cardiac Specific Problem / Diagnosis  Discussion and Data Reviewed Diagnostic Procedures Ordered Management Options Selected           1. PAF  are improving   Review and summation of old records:    Had a transient episode this am txed with lopressor No Continue current medications:     Yes           2. hypertension Initial presentation during this evaluation   Patient has a history of hypertension, which is managed and is on current therapy. The blood pressure history is:  Systolic (47FRC), HZI:272 , Min:79 , BBY:530    Diastolic (45TTD), SRV:08, Min:42, Max:91    No Continue current medications:    Yes           3. syncope Initial presentation during this evaluation No further episodes No Continue current medications:       Yes         PLAN:    1. Continue present medications except for changes as noted above  2. Continue to monitor rhythm  3. Further orders per clinical course. 4. Continue to monitor           Discussed with patient and family and nursing.     Electronically signed by Romeo Keyes

## 2017-09-22 NOTE — PROGRESS NOTES
Patient is resting in bed. Patient alert and oriented to person and place, forgetful to time. VSS, patient on RA SpO2 98%. Denies needs at this time. Will monitor.  Electronically signed by Toribio Garnett RN on 9/21/2017 at 8:27 PM

## 2017-09-22 NOTE — PROGRESS NOTES
Pharmacy Renal Adjustment    Taras Dang is a 80 y.o. female. Pharmacy has renally adjusted medications per protocol.     Recent Labs      09/20/17   1804  09/21/17   0810   BUN  21 21       Recent Labs      09/20/17   1804  09/21/17   0810   CREATININE  1.0*  0.8       CrCl calculated to be 32 ml/min using Cockcroft [employer] Gault    Height:   Ht Readings from Last 1 Encounters:   09/20/17 5' 1\" (1.549 m)     Weight:  Wt Readings from Last 1 Encounters:   09/20/17 84 lb (38.1 kg)       Plan: Adjust the following medications based on renal function:           Lovenox 1 mg/kg every 24 hours back to every 12 hours    Electronically signed by Wandy Marcos, [address] [place] [address] on 9/22/2017 at 4:57 AM

## 2017-09-23 LAB
GLUCOSE BLD-MCNC: 60 MG/DL (ref 70–99)
GLUCOSE BLD-MCNC: 73 MG/DL (ref 70–99)
GLUCOSE BLD-MCNC: 83 MG/DL (ref 70–99)
PERFORMED ON: ABNORMAL
PERFORMED ON: NORMAL
PERFORMED ON: NORMAL

## 2017-09-23 PROCEDURE — 2580000003 HC RX 258: Performed by: FAMILY MEDICINE

## 2017-09-23 PROCEDURE — 6360000002 HC RX W HCPCS: Performed by: FAMILY MEDICINE

## 2017-09-23 PROCEDURE — 2140000000 HC CCU INTERMEDIATE R&B

## 2017-09-23 PROCEDURE — 6370000000 HC RX 637 (ALT 250 FOR IP): Performed by: FAMILY MEDICINE

## 2017-09-23 PROCEDURE — 99232 SBSQ HOSP IP/OBS MODERATE 35: CPT | Performed by: INTERNAL MEDICINE

## 2017-09-23 PROCEDURE — 82948 REAGENT STRIP/BLOOD GLUCOSE: CPT

## 2017-09-23 RX ORDER — POLYETHYLENE GLYCOL 3350 17 G/17G
17 POWDER, FOR SOLUTION ORAL DAILY PRN
Status: DISCONTINUED | OUTPATIENT
Start: 2017-09-23 | End: 2017-09-26 | Stop reason: HOSPADM

## 2017-09-23 RX ADMIN — TOPIRAMATE 25 MG: 25 TABLET ORAL at 09:51

## 2017-09-23 RX ADMIN — ASPIRIN 81 MG CHEWABLE TABLET 81 MG: 81 TABLET CHEWABLE at 09:51

## 2017-09-23 RX ADMIN — Medication 10 ML: at 09:51

## 2017-09-23 RX ADMIN — CIPROFLOXACIN HYDROCHLORIDE 250 MG: 250 TABLET, FILM COATED ORAL at 09:51

## 2017-09-23 RX ADMIN — LEVOTHYROXINE SODIUM 100 MCG: 100 TABLET ORAL at 06:14

## 2017-09-23 RX ADMIN — ATORVASTATIN CALCIUM 20 MG: 20 TABLET, FILM COATED ORAL at 19:30

## 2017-09-23 RX ADMIN — ENOXAPARIN SODIUM 40 MG: 40 INJECTION SUBCUTANEOUS at 06:14

## 2017-09-23 RX ADMIN — PANTOPRAZOLE SODIUM 40 MG: 40 TABLET, DELAYED RELEASE ORAL at 06:14

## 2017-09-23 RX ADMIN — PANTOPRAZOLE SODIUM 40 MG: 40 TABLET, DELAYED RELEASE ORAL at 17:00

## 2017-09-23 RX ADMIN — CIPROFLOXACIN HYDROCHLORIDE 250 MG: 250 TABLET, FILM COATED ORAL at 19:30

## 2017-09-23 RX ADMIN — Medication 10 ML: at 19:31

## 2017-09-23 RX ADMIN — MIDODRINE HYDROCHLORIDE 5 MG: 5 TABLET ORAL at 17:00

## 2017-09-23 RX ADMIN — OXYBUTYNIN CHLORIDE 10 MG: 5 TABLET, EXTENDED RELEASE ORAL at 19:30

## 2017-09-23 RX ADMIN — MIDODRINE HYDROCHLORIDE 5 MG: 5 TABLET ORAL at 09:51

## 2017-09-23 RX ADMIN — MULTIPLE VITAMINS W/ MINERALS TAB 1 TABLET: TAB at 09:50

## 2017-09-23 RX ADMIN — METOPROLOL SUCCINATE 25 MG: 25 TABLET, EXTENDED RELEASE ORAL at 09:51

## 2017-09-23 RX ADMIN — ENOXAPARIN SODIUM 40 MG: 40 INJECTION SUBCUTANEOUS at 17:00

## 2017-09-23 ASSESSMENT — PAIN SCALES - GENERAL: PAINLEVEL_OUTOF10: 0

## 2017-09-23 NOTE — PROGRESS NOTES
Family Medicine Progress Note    Patient:  Melania Dixon  YOB: 1933    MRN: [id number]     Acct: [de-identified]     Admit date: 9/20/2017    Patient Seen, Chart, Consults notes, Labs, Radiology studies reviewed. Subjective: Day 3 of stay with TIA, paroxysmal atrial fibrillation, and advanced age and most recent (in last 24 hours) has had no new problems overnight. He is having some constipation and no bowel movement since admission per her daughter. She is feeling overall better. Past, Family, Social History unchanged from admission. Diet:  DIET GENERAL;    Medications:  Scheduled Meds:   enoxaparin  1 mg/kg Subcutaneous Q12H    pantoprazole  40 mg Oral BID AC    midodrine  5 mg Oral BID    levothyroxine  100 mcg Oral Daily    topiramate  25 mg Oral Daily    atorvastatin  20 mg Oral Nightly    oxybutynin  10 mg Oral Nightly    therapeutic multivitamin-minerals  1 tablet Oral Daily    metoprolol succinate  25 mg Oral Daily    aspirin  81 mg Oral Daily    ciprofloxacin  250 mg Oral BID    sodium chloride flush  10 mL Intravenous 2 times per day     Continuous Infusions:   PRN Meds:polyethylene glycol, metoprolol, diphenhydrAMINE, nitroGLYCERIN, sodium chloride flush, acetaminophen    Objective:    Vitals: BP (!) 144/70  Pulse 81  Temp 96.8 °F (36 °C) (Temporal)   Resp 16  Ht 5' 1\" (1.549 m)  Wt 84 lb (38.1 kg)  SpO2 96%  BMI 15.87 kg/m2  24 hour intake/output:  Intake/Output Summary (Last 24 hours) at 09/23/17 1235  Last data filed at 09/23/17 0855   Gross per 24 hour   Intake              120 ml   Output             1200 ml   Net            -1080 ml     Last 3 weights:   Wt Readings from Last 3 Encounters:   09/23/17 84 lb (38.1 kg)   09/20/17 83 lb 9.6 oz (37.9 kg)   01/24/16 105 lb (47.6 kg)       Physical Exam:    General Appearance:  awake, alert, oriented, in no acute distress and appears frail  Skin:  negatives: mobility and turgor normal  Eyes:  No gross for: CSTOOL    Assessment:    Principal Problem:    TIA (transient ischemic attack)  Active Problems:    Precordial pain    Paroxysmal atrial fibrillation (HCC)    Dysarthria    Hemispheric carotid artery syndrome    Weight loss, unintentional    Transient alteration of awareness          Plan:  Improving somewhat but still very weak. Complains of constipation we'll add MiraLAX which she takes at home. Discussed with patient and her daughter in terms of discharge planning. I believe they are leaning toward admission for short-term rehab once acute hospital stay is completed. We'll make sure that  is aware of this so that the process can begin as soon as possible.       Electronically signed by Xochitl Shah MD on 9/23/2017 at 12:35 PM

## 2017-09-23 NOTE — PROGRESS NOTES
acquired pneumonia) 01/05/2016     Priority: Low    Generalized weakness 01/05/2016     Priority: Low    Hyponatremia 01/05/2016     Priority: Low     Current Facility-Administered Medications   Medication Dose Route Frequency Provider Last Rate Last Dose    polyethylene glycol (GLYCOLAX) packet 17 g  17 g Oral Daily PRN Ghazal Fountain MD        enoxaparin (LOVENOX) injection 40 mg  1 mg/kg Subcutaneous Q12H Sasha Colon MD   40 mg at 09/23/17 3764    metoprolol (LOPRESSOR) injection 2.5 mg  2.5 mg Intravenous Q6H PRN Fish Gunter MD   2.5 mg at 09/22/17 0830    diphenhydrAMINE (BENADRYL) tablet 25 mg  25 mg Oral Q6H PRN Kvng Browne MD   25 mg at 09/22/17 2112    pantoprazole (PROTONIX) tablet 40 mg  40 mg Oral BID AC Sasha Colon MD   40 mg at 09/23/17 0386    midodrine (PROAMATINE) tablet 5 mg  5 mg Oral BID Sasha Colon MD   5 mg at 09/23/17 7463    levothyroxine (SYNTHROID) tablet 100 mcg  100 mcg Oral Daily Sasha Colon MD   100 mcg at 09/23/17 2046    topiramate (TOPAMAX) tablet 25 mg  25 mg Oral Daily Sasha Colon MD   25 mg at 09/23/17 7857    atorvastatin (LIPITOR) tablet 20 mg  20 mg Oral Nightly Sasha Colon MD   20 mg at 09/22/17 2112    oxybutynin (DITROPAN-XL) extended release tablet 10 mg  10 mg Oral Nightly Sasha Colon MD   10 mg at 09/22/17 2112    therapeutic multivitamin-minerals 1 tablet  1 tablet Oral Daily Sasha Colon MD   1 tablet at 09/23/17 0950    metoprolol succinate (TOPROL XL) extended release tablet 25 mg  25 mg Oral Daily Sasha Colon MD   25 mg at 09/23/17 6690    aspirin chewable tablet 81 mg  81 mg Oral Daily Sasha Colon MD   81 mg at 09/23/17 2718    ciprofloxacin (CIPRO) tablet 250 mg  250 mg Oral BID Sasha Colon MD   250 mg at 09/23/17 0951    nitroGLYCERIN (NITROSTAT) SL tablet 0.4 mg  0.4 mg Sublingual Q5 Min PRN Sasha Colon MD   0.4 mg at 09/21/17 1108    sodium chloride flush 0.9 % injection 10 mL  10 mL Intravenous 2 times per day Sp Shipley MD   10 mL at 09/23/17 0951    sodium chloride flush 0.9 % injection 10 mL  10 mL Intravenous PRN Sp Shipley MD        acetaminophen (TYLENOL) tablet 650 mg  650 mg Oral Q4H PRN Sp Shipley MD         Allergies: Ceftriaxone  Past Medical History:   Diagnosis Date    Anxiety     GERD (gastroesophageal reflux disease)     Macular degeneration     Palliative care encounter     Pneumonia     Weakness      Past Surgical History:   Procedure Laterality Date    CATARACT REMOVAL WITH IMPLANT  10/2015    right eye    CHOLECYSTECTOMY      COLON SURGERY      straighten kink    HIP SURGERY      right replacement     Family History   Problem Relation Age of Onset    Heart Disease Mother     Stroke Mother     Heart Disease Father     Heart Disease Sister     Obesity Sister     No Known Problems Brother      Social History   Substance Use Topics    Smoking status: Never Smoker    Smokeless tobacco: Not on file    Alcohol use No          Review of Systems:    General:      Complaint / Symptom Yes / No / Description if Yes       Fatigue Yes: chronic   Weight gain No   Insomnia No       Respiratory:        Complaint / Symptom Yes / No / Description if Yes       Cough No   Horseness No       Cardiovascular:    Complaint / Symptom Yes / No / Description if Yes       Chest Pain No   Shortness of Air / Orthopnea Yes: mild and chronic   Presyncope / Syncope No   Palpitations No         Objective:    BP (!) 144/70  Pulse 81  Temp 96.8 °F (36 °C) (Temporal)   Resp 16  Ht 5' 1\" (1.549 m)  Wt 84 lb (38.1 kg)  SpO2 96%  BMI 15.87 kg/m2    GENERAL - well developed and well nourished    HEENT   PERRLA, Hearing appears normal, conjunctiva and lids are normal, ears and nose appear normal  NECK - no thyromegaly, no JVD, trachea is in the midline  CARDIOVASCULAR  PMI is in the left mid line clavicular position, Normal S1 and S2 with a grade 1/6 systolic murmur. No S3 or S4    PULMONARY  No respiratory distress. A few wheezes and rales. ABDOMEN   soft, non tender, no rebound, no hepatomegaly or splenomegaly  MUSCULOSKELETAL   Prone/Supine, digitals and nails are without clubbing or cyanosis  EXTREMITIES - no edema  NEUROLOGIC - cranial nerves, II-XII, are normal  SKIN - turgor is normal, no rash  PSYCHIATRIC - normal mood and affect, alert and orientated x 3, judgement and insight appear appropriate      ASSESSMENT:    ALL THE CARDIOLOGY PROBLEMS ARE LISTED ABOVE; HOWEVER, THE FOLLOWING SPECIFIC CARDIAC PROBLEMS / CONDITIONS WERE ADDRESSED AND TREATED DURING THE OFFICE VISIT TODAY:                                                                                            MEDICAL DECISION MAKING             Cardiac Specific Problem / Diagnosis  Discussion and Data Reviewed Diagnostic Procedures Ordered Management Options Selected           1. Paroxymal atrial fibrillation  show no change   Review and summation of old records:    No further episodes No Continue current medications:     Yes           2. Hypertension Initial presentation during this evaluation   Patient has a history of hypertension, which is managed and is on current therapy. The blood pressure history is:  Systolic (61PRL), CIPRIANO:365 , Min:79 , JSO:113    Diastolic (63ZXF), XIY:17, Min:45, Max:70    No Continue current medications:    Yes           3. syncope Initial presentation during this evaluation No further spells No Continue current medications:       Yes         PLAN:    1. Continue present medications except for changes as noted above  2. Continue to monitor rhythm  3. Further orders per clinical course. 4. Continued observation           Discussed with patient and family and nursing.     Electronically signed by Marta Che MD on 9/23/17        [facility]

## 2017-09-23 NOTE — PLAN OF CARE
Problem: Nutrition  Goal: Optimal nutrition therapy  Nutrition Problem: Severe malnutrition, in context of acute illness or injury  Intervention: Food and/or Nutrient Delivery: Continue current diet, Start ONS  Nutritional Goals: po intake 50% or greater.  Weight stable or no weight loss  Outcome: Ongoing

## 2017-09-23 NOTE — PROGRESS NOTES
Nutrition Assessment    Type and Reason for Visit: Initial    Nutrition Recommendations: start ONS- magic cup at [facility]    Malnutrition Assessment:  · Malnutrition Status: Meets the criteria for severe malnutrition  · Context: Chronic illness  · Findings of the 6 clinical characteristics of malnutrition (Minimum of 2 out of 6 clinical characteristics is required to make the diagnosis of moderate or severe Protein Calorie Malnutrition based on AND/ASPEN Guidelines):  1. Energy Intake-51% to 75%, greater than or equal to 3 months    2. Weight Loss-20% loss or greater, in 1 year  3. Fat Loss-Severe subcutaneous fat loss, Orbital, Triceps, Fat overlying the ribs  4. Muscle Loss-Severe muscle mass loss, Temples (temporalis muscle), Clavicles (pectoralis and deltoids)  5. Fluid Accumulation-Unable to assess,    6.  Strength-Not measured    Nutrition Diagnosis:   · Problem: Severe malnutrition, in context of acute illness or injury  · Etiology: related to Insufficient energy/nutrient consumption     Signs and symptoms:  as evidenced by BMI, Patient report of, Weight loss greater than or equal to 20% in 1 year    Nutrition Assessment:  · Subjective Assessment: Following for BMI 15.9.   Appetite is fair with intake slowly improving now 25-75%  · Nutrition-Focused Physical Findings: very thin female  · Wound Type:    · Current Nutrition Therapies:  · Oral Diet Orders: General   · Oral Diet intake: 26-50%, 51-75%  · Oral Nutrition Supplement (ONS) Orders: None, Frozen Oral Supplement  · ONS intake: Unable to assess     · Anthropometric Measures:  · Ht: 5' 1\" (154.9 cm)   · Current Body Wt: 84 lb (38.1 kg)  · Admission Body Wt: 89 lb (40.4 kg) (stated)  · Usual Body Wt: 89 lb (40.4 kg)  · BMI Classification: BMI <18.5 Underweight    Estimated Intake vs Estimated Needs: Intake Less Than Needs    Nutrition Risk Level: High    Nutrition Interventions:   Continue current diet, Start ONS  Continued Inpatient Monitoring    Nutrition Evaluation:   · Evaluation: Goals set   · Goals: po intake 50% or greater. Weight stable or no weight loss    · Monitoring: Meal Intake, Supplement Intake, Diet Tolerance, Skin Integrity, Weight, Pertinent Labs    See Adult Nutrition Doc Flowsheet for more detail.      Electronically signed by Guille Dickinson MS, RD, LD on 9/23/17 at 1:23 PM    Contact Number: [phone]

## 2017-09-24 LAB
ANION GAP SERPL CALCULATED.3IONS-SCNC: 13 MMOL/L (ref 7–19)
BUN BLDV-MCNC: 15 MG/DL (ref 8–23)
CALCIUM SERPL-MCNC: 8.3 MG/DL (ref 8.8–10.2)
CHLORIDE BLD-SCNC: 95 MMOL/L (ref 98–111)
CO2: 21 MMOL/L (ref 22–29)
CREAT SERPL-MCNC: 0.7 MG/DL (ref 0.5–0.9)
GFR NON-AFRICAN AMERICAN: >60
GLUCOSE BLD-MCNC: 64 MG/DL (ref 74–109)
GLUCOSE BLD-MCNC: 69 MG/DL (ref 70–99)
GLUCOSE BLD-MCNC: 79 MG/DL (ref 70–99)
HCT VFR BLD CALC: 28.2 % (ref 37–47)
HEMOGLOBIN: 9.6 G/DL (ref 12–16)
MCH RBC QN AUTO: 32.7 PG (ref 27–31)
MCHC RBC AUTO-ENTMCNC: 34 G/DL (ref 33–37)
MCV RBC AUTO: 95.9 FL (ref 81–99)
PDW BLD-RTO: 13.1 % (ref 11.5–14.5)
PERFORMED ON: ABNORMAL
PERFORMED ON: NORMAL
PLATELET # BLD: 193 K/UL (ref 130–400)
PMV BLD AUTO: 11.8 FL (ref 9.4–12.3)
POTASSIUM SERPL-SCNC: 4.1 MMOL/L (ref 3.5–5)
RBC # BLD: 2.94 M/UL (ref 4.2–5.4)
SODIUM BLD-SCNC: 129 MMOL/L (ref 136–145)
WBC # BLD: 5.2 K/UL (ref 4.8–10.8)

## 2017-09-24 PROCEDURE — 80048 BASIC METABOLIC PNL TOTAL CA: CPT

## 2017-09-24 PROCEDURE — 2140000000 HC CCU INTERMEDIATE R&B

## 2017-09-24 PROCEDURE — 2580000003 HC RX 258: Performed by: FAMILY MEDICINE

## 2017-09-24 PROCEDURE — 6360000002 HC RX W HCPCS: Performed by: FAMILY MEDICINE

## 2017-09-24 PROCEDURE — 6370000000 HC RX 637 (ALT 250 FOR IP): Performed by: FAMILY MEDICINE

## 2017-09-24 PROCEDURE — 99232 SBSQ HOSP IP/OBS MODERATE 35: CPT | Performed by: INTERNAL MEDICINE

## 2017-09-24 PROCEDURE — 85027 COMPLETE CBC AUTOMATED: CPT

## 2017-09-24 PROCEDURE — 82948 REAGENT STRIP/BLOOD GLUCOSE: CPT

## 2017-09-24 RX ADMIN — MULTIPLE VITAMINS W/ MINERALS TAB 1 TABLET: TAB at 08:39

## 2017-09-24 RX ADMIN — MIDODRINE HYDROCHLORIDE 5 MG: 5 TABLET ORAL at 17:03

## 2017-09-24 RX ADMIN — CIPROFLOXACIN HYDROCHLORIDE 250 MG: 250 TABLET, FILM COATED ORAL at 19:19

## 2017-09-24 RX ADMIN — Medication 10 ML: at 08:40

## 2017-09-24 RX ADMIN — ASPIRIN 81 MG CHEWABLE TABLET 81 MG: 81 TABLET CHEWABLE at 08:39

## 2017-09-24 RX ADMIN — ENOXAPARIN SODIUM 40 MG: 40 INJECTION SUBCUTANEOUS at 06:05

## 2017-09-24 RX ADMIN — CIPROFLOXACIN HYDROCHLORIDE 250 MG: 250 TABLET, FILM COATED ORAL at 08:40

## 2017-09-24 RX ADMIN — ATORVASTATIN CALCIUM 20 MG: 20 TABLET, FILM COATED ORAL at 19:19

## 2017-09-24 RX ADMIN — LEVOTHYROXINE SODIUM 100 MCG: 100 TABLET ORAL at 06:05

## 2017-09-24 RX ADMIN — TOPIRAMATE 25 MG: 25 TABLET ORAL at 08:39

## 2017-09-24 RX ADMIN — METOPROLOL SUCCINATE 25 MG: 25 TABLET, EXTENDED RELEASE ORAL at 08:40

## 2017-09-24 RX ADMIN — PANTOPRAZOLE SODIUM 40 MG: 40 TABLET, DELAYED RELEASE ORAL at 17:03

## 2017-09-24 RX ADMIN — OXYBUTYNIN CHLORIDE 10 MG: 5 TABLET, EXTENDED RELEASE ORAL at 19:19

## 2017-09-24 RX ADMIN — PANTOPRAZOLE SODIUM 40 MG: 40 TABLET, DELAYED RELEASE ORAL at 06:05

## 2017-09-24 RX ADMIN — ENOXAPARIN SODIUM 40 MG: 40 INJECTION SUBCUTANEOUS at 17:04

## 2017-09-24 RX ADMIN — MIDODRINE HYDROCHLORIDE 5 MG: 5 TABLET ORAL at 08:39

## 2017-09-24 RX ADMIN — Medication 10 ML: at 19:19

## 2017-09-24 NOTE — PROGRESS NOTES
Family Medicine Progress Note    Patient:  Eric Syed  YOB: 1933    MRN: [id number]     Acct: [de-identified]     Admit date: 9/20/2017    Patient Seen, Chart, Consults notes, Labs, Radiology studies reviewed. Subjective: Day 4 of stay with TIA and most recent (in last 24 hours) has had no new symptoms. Is feeling better. Her son is in the room and states she appears more \"lucid\" today. Appetite is only marginal.    Past, Family, Social History unchanged from admission. Diet:  DIET GENERAL;    Medications:  Scheduled Meds:   enoxaparin  1 mg/kg Subcutaneous Q12H    pantoprazole  40 mg Oral BID AC    midodrine  5 mg Oral BID    levothyroxine  100 mcg Oral Daily    topiramate  25 mg Oral Daily    atorvastatin  20 mg Oral Nightly    oxybutynin  10 mg Oral Nightly    therapeutic multivitamin-minerals  1 tablet Oral Daily    metoprolol succinate  25 mg Oral Daily    aspirin  81 mg Oral Daily    ciprofloxacin  250 mg Oral BID    sodium chloride flush  10 mL Intravenous 2 times per day     Continuous Infusions:   PRN Meds:polyethylene glycol, metoprolol, diphenhydrAMINE, nitroGLYCERIN, sodium chloride flush, acetaminophen    Objective:    Vitals: /61  Pulse 58  Temp 96.7 °F (35.9 °C) (Temporal)   Resp 16  Ht 5' 1\" (1.549 m)  Wt 82 lb 4 oz (37.3 kg)  SpO2 100%  BMI 15.54 kg/m2  24 hour intake/output:  Intake/Output Summary (Last 24 hours) at 09/24/17 1251  Last data filed at 09/24/17 1245   Gross per 24 hour   Intake              450 ml   Output             1200 ml   Net             -750 ml     Last 3 weights: Wt Readings from Last 3 Encounters:   09/24/17 82 lb 4 oz (37.3 kg)   09/20/17 83 lb 9.6 oz (37.9 kg)   01/24/16 105 lb (47.6 kg)       Physical Exam:    General Appearance:  awake, alert, oriented, in no acute distress and appears frail  Skin:  Skin color, texture, turgor normal. No rashes or lesions.   Eyes:  Sclera nonicteric  Neck:  neck- supple, no mass, non-tender  Lungs:  Normal expansion. Clear to auscultation. No rales, rhonchi, or wheezing. Heart:  Heart regular rate and rhythm  Abdomen: Auscultation: Normal bowel sounds. No bruits. Palpation: No masses, tenderness or organomegally. Extremities: Extremities warm to touch, pink, with no edema.   Musculoskeletal:  negative  Neurologic:  Grossly nonfocal    CBC with Differential:  Lab Results   Component Value Date    WBC 5.2 09/24/2017    RBC 2.94 09/24/2017    HGB 9.6 09/24/2017    HCT 28.2 09/24/2017    HCT 34.3 01/05/2012     09/24/2017     01/05/2012    MCV 95.9 09/24/2017    MCH 32.7 09/24/2017    MCHC 34.0 09/24/2017    RDW 13.1 09/24/2017    BANDSPCT 1 01/24/2016    LYMPHOPCT 27.1 09/20/2017    MONOPCT 7.9 09/20/2017    MYELOPCT 1 01/24/2016    EOSPCT 1.0 01/05/2012    BASOPCT 0.4 09/20/2017    MONOSABS 0.40 09/20/2017    LYMPHSABS 1.4 09/20/2017    EOSABS 0.60 09/20/2017    BASOSABS 0.00 09/20/2017     CMP:  Lab Results   Component Value Date     09/24/2017     01/05/2012    K 4.1 09/24/2017    K 4.1 01/05/2012    CL 95 09/24/2017     01/05/2012    CO2 21 09/24/2017    BUN 15 09/24/2017    CREATININE 0.7 09/24/2017    CREATININE 0.8 01/05/2012    LABGLOM >60 09/24/2017    GLUCOSE 64 09/24/2017    PROT 6.2 09/21/2017    PROT 5.6 07/22/2012    LABALBU 3.5 09/21/2017    LABALBU 4.2 01/05/2012    CALCIUM 8.3 09/24/2017    BILITOT 0.6 09/21/2017    ALKPHOS 74 09/21/2017    ALKPHOS 105 01/05/2012    AST 27 09/21/2017    ALT 20 09/21/2017     Last 3 Troponin:  Lab Results   Component Value Date    TROPONINI <0.01 09/22/2017    TROPONINI <0.01 09/21/2017    TROPONINI 0.00 09/20/2017    TROPONINI 0.00 09/18/2017    TROPONINI 0.00 09/18/2017     Urine Culture:  No components found for: CURINE  Blood Culture:  No components found for: CBLOOD, CFUNGUSBL  Stool Culture:  No components found for: CSTOOL    Assessment:    Principal Problem:    TIA (transient ischemic attack)  Active Problems:    Precordial pain    Paroxysmal atrial fibrillation (HCC)    Dysarthria    Hemispheric carotid artery syndrome    Weight loss, unintentional    Transient alteration of awareness          Plan:  Continue present plans. Appreciate cardiology's assistance. As per discussion with patient's daughter I think there may need toward acute rehab at the time of discharge. We have a consult for  to assist with that.       Electronically signed by Toni Wright MD on 9/24/2017 at 12:51 PM

## 2017-09-24 NOTE — PROGRESS NOTES
01/25/2016     Priority: Low    Hypotension 01/25/2016     Priority: Low    Dermatitis 01/25/2016     Priority: Low    Anemia 01/25/2016     Priority: Low    UTI (urinary tract infection)      Priority: Low    SIADH (syndrome of inappropriate ADH production) ([facility] 01/09/2016     Priority: Low    CAP (community acquired pneumonia) 01/05/2016     Priority: Low    Generalized weakness 01/05/2016     Priority: Low    Hyponatremia 01/05/2016     Priority: Low     Current Facility-Administered Medications   Medication Dose Route Frequency Provider Last Rate Last Dose    polyethylene glycol (GLYCOLAX) packet 17 g  17 g Oral Daily PRN Norberto Prasad MD        enoxaparin (LOVENOX) injection 40 mg  1 mg/kg Subcutaneous Q12H Deandre Mcimllan MD   40 mg at 09/24/17 6182    metoprolol (LOPRESSOR) injection 2.5 mg  2.5 mg Intravenous Q6H PRN Pelon Almonte MD   2.5 mg at 09/22/17 0830    diphenhydrAMINE (BENADRYL) tablet 25 mg  25 mg Oral Q6H PRN Estephania Yee MD   25 mg at 09/22/17 2112    pantoprazole (PROTONIX) tablet 40 mg  40 mg Oral BID AC Deandre Mcmillan MD   40 mg at 09/24/17 4907    midodrine (PROAMATINE) tablet 5 mg  5 mg Oral BID Deandre Mcmillan MD   5 mg at 09/24/17 0398    levothyroxine (SYNTHROID) tablet 100 mcg  100 mcg Oral Daily Deandre Mcmillan MD   100 mcg at 09/24/17 9583    topiramate (TOPAMAX) tablet 25 mg  25 mg Oral Daily Deandre Mcmillan MD   25 mg at 09/24/17 9276    atorvastatin (LIPITOR) tablet 20 mg  20 mg Oral Nightly Deandre Mcmillan MD   20 mg at 09/23/17 1930    oxybutynin (DITROPAN-XL) extended release tablet 10 mg  10 mg Oral Nightly Deandre Mcmillan MD   10 mg at 09/23/17 1930    therapeutic multivitamin-minerals 1 tablet  1 tablet Oral Daily Deandre Mcmillan MD   1 tablet at 09/24/17 5947    metoprolol succinate (TOPROL XL) extended release tablet 25 mg  25 mg Oral Daily Deandre Mcmillan MD   25 mg at 09/24/17 0840    aspirin chewable tablet 81 mg 81 mg Oral Daily Monse Rock MD   81 mg at 09/24/17 5791    ciprofloxacin (CIPRO) tablet 250 mg  250 mg Oral BID Monse Rock MD   250 mg at 09/24/17 0840    nitroGLYCERIN (NITROSTAT) SL tablet 0.4 mg  0.4 mg Sublingual Q5 Min PRN Monse Rock MD   0.4 mg at 09/21/17 1103    sodium chloride flush 0.9 % injection 10 mL  10 mL Intravenous 2 times per day Monse Rock MD   10 mL at 09/24/17 0840    sodium chloride flush 0.9 % injection 10 mL  10 mL Intravenous PRN Monse Rock MD        acetaminophen (TYLENOL) tablet 650 mg  650 mg Oral Q4H PRN Monse Rock MD         Allergies: Ceftriaxone  Past Medical History:   Diagnosis Date    Anxiety     GERD (gastroesophageal reflux disease)     Macular degeneration     Palliative care encounter     Pneumonia     Weakness      Past Surgical History:   Procedure Laterality Date    CATARACT REMOVAL WITH IMPLANT  10/2015    right eye    CHOLECYSTECTOMY      COLON SURGERY      straighten kink    HIP SURGERY      right replacement     Family History   Problem Relation Age of Onset    Heart Disease Mother     Stroke Mother     Heart Disease Father     Heart Disease Sister     Obesity Sister     No Known Problems Brother      Social History   Substance Use Topics    Smoking status: Never Smoker    Smokeless tobacco: Not on file    Alcohol use No          Review of Systems:    General:      Complaint / Symptom Yes / No / Description if Yes       Fatigue No   Weight gain No   Insomnia No       Respiratory:        Complaint / Symptom Yes / No / Description if Yes       Cough No   Horseness No       Cardiovascular:    Complaint / Symptom Yes / No / Description if Yes       Chest Pain No   Shortness of Air / Orthopnea No   Presyncope / Syncope No   Palpitations No         Objective:    /67  Pulse 69  Temp 96.8 °F (36 °C) (Temporal)   Resp 16  Ht 5' 1\" (1.549 m)  Wt 82 lb 4 oz (37.3 kg)  SpO2 98%  BMI 15.54 kg/m2    GENERAL - well developed and well nourished    HEENT   PERRLA, Hearing appears normal, conjunctiva and lids are normal, ears and nose appear normal  NECK - no thyromegaly, no JVD, trachea is in the midline  CARDIOVASCULAR  PMI is in the left mid line clavicular position, Normal S1 and S2 with a grade 1/6 systolic murmur. No S3 or S4    PULMONARY  No respiratory distress. scattered wheezes and rales. ABDOMEN   soft, non tender, no rebound, no hepatomegaly or splenomegaly  MUSCULOSKELETAL   Sitting, digitals and nails are without clubbing or cyanosis  EXTREMITIES - trace edema  NEUROLOGIC - cranial nerves, II-XII, are normal  SKIN - turgor is normal, no rash  PSYCHIATRIC - normal mood and affect, alert and orientated x 3, judgement and insight appear appropriate      ASSESSMENT:    ALL THE CARDIOLOGY PROBLEMS ARE LISTED ABOVE; HOWEVER, THE FOLLOWING SPECIFIC CARDIAC PROBLEMS / CONDITIONS WERE ADDRESSED AND TREATED DURING THE OFFICE VISIT TODAY:                                                                                            MEDICAL DECISION MAKING                     Cardiac Specific Problem / Diagnosis   Discussion and Data Reviewed Diagnostic Procedures Ordered Management Options Selected                 1. Paroxymal atrial fibrillation  show no change Review and summation of old records:     No further episodes, reviewed telemetry, currently in NSR No Continue current medications:      Yes                 2. Hypertension Initial presentation during this evaluation Patient has a history of hypertension, which is managed and is on current therapy. The blood pressure history is:  Systolic (51ZEL), PFT:754 , Min:113 , VBW:116    Diastolic (79NVX), UVA:58, Min:62, Max:73       No Continue current medications:     Yes                 3. syncope Initial presentation during this evaluation No further spells No Continue current medications:        Yes         PLAN:    1.  Continue present medications except for changes as noted above  2. Continue to monitor rhythm  3. Further orders per clinical course. 4. Continue to monitor           Discussed with patient and family and nursing.     Electronically signed by Peter Robles MD on 9/24/17        [id number] [facility] [place]

## 2017-09-25 PROCEDURE — 6370000000 HC RX 637 (ALT 250 FOR IP): Performed by: FAMILY MEDICINE

## 2017-09-25 PROCEDURE — 2580000003 HC RX 258: Performed by: FAMILY MEDICINE

## 2017-09-25 PROCEDURE — 6370000000 HC RX 637 (ALT 250 FOR IP): Performed by: PHYSICIAN ASSISTANT

## 2017-09-25 PROCEDURE — 6360000002 HC RX W HCPCS: Performed by: FAMILY MEDICINE

## 2017-09-25 PROCEDURE — 2140000000 HC CCU INTERMEDIATE R&B

## 2017-09-25 PROCEDURE — 99232 SBSQ HOSP IP/OBS MODERATE 35: CPT | Performed by: INTERNAL MEDICINE

## 2017-09-25 RX ORDER — WARFARIN SODIUM 5 MG/1
10 TABLET ORAL
Status: DISCONTINUED | OUTPATIENT
Start: 2017-09-25 | End: 2017-09-25

## 2017-09-25 RX ADMIN — ATORVASTATIN CALCIUM 20 MG: 20 TABLET, FILM COATED ORAL at 19:43

## 2017-09-25 RX ADMIN — Medication 10 ML: at 10:03

## 2017-09-25 RX ADMIN — Medication 10 ML: at 19:43

## 2017-09-25 RX ADMIN — ENOXAPARIN SODIUM 40 MG: 40 INJECTION SUBCUTANEOUS at 05:24

## 2017-09-25 RX ADMIN — PANTOPRAZOLE SODIUM 40 MG: 40 TABLET, DELAYED RELEASE ORAL at 17:28

## 2017-09-25 RX ADMIN — METOPROLOL SUCCINATE 25 MG: 25 TABLET, EXTENDED RELEASE ORAL at 10:04

## 2017-09-25 RX ADMIN — MULTIPLE VITAMINS W/ MINERALS TAB 1 TABLET: TAB at 10:03

## 2017-09-25 RX ADMIN — MIDODRINE HYDROCHLORIDE 5 MG: 5 TABLET ORAL at 17:28

## 2017-09-25 RX ADMIN — TOPIRAMATE 25 MG: 25 TABLET ORAL at 10:04

## 2017-09-25 RX ADMIN — APIXABAN 2.5 MG: 2.5 TABLET, FILM COATED ORAL at 19:40

## 2017-09-25 RX ADMIN — OXYBUTYNIN CHLORIDE 10 MG: 5 TABLET, EXTENDED RELEASE ORAL at 19:43

## 2017-09-25 RX ADMIN — LEVOTHYROXINE SODIUM 100 MCG: 100 TABLET ORAL at 05:24

## 2017-09-25 RX ADMIN — PANTOPRAZOLE SODIUM 40 MG: 40 TABLET, DELAYED RELEASE ORAL at 05:24

## 2017-09-25 RX ADMIN — MIDODRINE HYDROCHLORIDE 5 MG: 5 TABLET ORAL at 10:04

## 2017-09-25 NOTE — PROGRESS NOTES
Spoke with Ronnie MONROE this am. Tentative plan to start patient on coumadin today, bridge with Lovenox; hopefully dc to [facility] tomorrow.    Electronically signed by Jarek Gregory RN on 9/25/2017 at 8:02 AM

## 2017-09-25 NOTE — PROGRESS NOTES
attack)  Active Problems:    Precordial pain    Paroxysmal atrial fibrillation     Dysarthria    Hemispheric carotid artery syndrome    Weight loss, unintentional     UTI-cipro    Transient alteration of awareness--resolved    HTN/hypotension- toprol/proamatine        Plan:  Continue present plans. Appreciate cardiology's assistance. Cont to monitor  Family conference  lovenox to coumadin  Awaiting placement @ [facility].  NC tomorrow    Electronically signed by Natasha Dennis PA-C on 9/25/2017 at 7:54 AM

## 2017-09-25 NOTE — PROGRESS NOTES
Problems    Paroxysmal atrial fibrillation (HCC)        Hypotension        Syncope and collapse        Hemispheric carotid artery syndrome        * (Principal)TIA (transient ischemic attack)                 PFSH:  Any new information:  no       Change:  no      Review of Systems:    General:      Complaint / Symptom Yes / No / Description if Yes       Fatigue No   Weight gain No   Insomnia N/A       Respiratory:        Complaint / Symptom Yes / No / Description if Yes       Cough No   Horseness N/A       Cardiovascular:    Complaint / Symptom Yes / No / Description if Yes       Chest Pain No   Shortness of Air / Orthopnea No   Presyncope / Syncope No   Palpitations No         Physical Examination:    /60  Pulse 63  Temp 98.2 °F (36.8 °C) (Temporal)   Resp 16  Ht 5' 1\" (1.549 m)  Wt 83 lb 9.6 oz (37.9 kg)  SpO2 100%  BMI 15.8 kg/m2    GENERAL - well developed and well nourished    HEENT   PERRLA, Hearing appears normal, conjunctiva and lids are normal, ears and nose appear normal  NECK - no thyromegaly, no JVD, trachea is in the midline  CARDIOVASCULAR  PMI is in the left mid line clavicular position, Normal S1 and S2 with a grade 1/6 systolic murmur. No S3 or S4    PULMONARY  no respiratory distress. no wheezes and rales.    ABDOMEN   soft, non tender, no rebound, no hepatomegaly or splenomegaly  MUSCULOSKELETAL   Prone/Supine, digitals and nails are without clubbing or cyanosis  EXTREMITIES - trace edema  NEUROLOGIC - cranial nerves, II-XII, are normal  SKIN - turgor is normal, no rash  PSYCHIATRIC - normal mood and affect, alert and orientated x 3, judgement and insight appear appropriate           Current Inpatient Medications:   apixaban  2.5 mg Oral BID    pantoprazole  40 mg Oral BID AC    midodrine  5 mg Oral BID    levothyroxine  100 mcg Oral Daily    topiramate  25 mg Oral Daily    atorvastatin  20 mg Oral Nightly    oxybutynin  10 mg Oral Nightly    therapeutic multivitamin-minerals  1 tablet Oral Daily    metoprolol succinate  25 mg Oral Daily    sodium chloride flush  10 mL Intravenous 2 times per day       IV Infusions (if any):         LABORATORY EVALUATION & TESTING:    I have personally reviewed and interpreted the results of the following diagnostic testing and noted in the nurse's note above      EKG and or Telemetry:  which was personally reviewed me:  Sinus rhythm,   Pulse Readings from Last 1 Encounters:   17 63           ALL THE CARDIOLOGY PROBLEMS ARE LISTED ABOVE; HOWEVER, THE FOLLOWING SPECIFIC CARDIAC PROBLEMS WERE ADDRESSED AND TREATED DURING [facility] VISIT TODAY:                             Cardiac Specific Problem / Diagnosis    Discussion and Data Reviewed Diagnostic Procedures Ordered Management Options Selected                       1. Paroxymal atrial fibrillation  show no change Review and summation of old records:      No further episodes, reviewed telemetry, currently in NSR No Continue current medications:       Yes                       2. Hypertension Initial presentation during this evaluation Patient has a history of hypertension, which is managed and is on current therapy. The blood pressure history is:  Systolic (41IAI), NR , Min:100 , LJA:788    Diastolic (67PBK), XOU:30, Min:56, Max:68    No Continue current medications:      Yes                       3. syncope Initial presentation during this evaluation No further spells No Continue current medications:         Yes              PLAN:    1. Continue present medications except for changes as noted above  2. Continue to monitor rhythm  3. Further orders per clinical course. 4. Ok to DC tomorrow if ok with others. Discussed with patient and family and nursing.       Electronically signed by Enzo Huntley MD on 17 at 11:34 [address] [facility]

## 2017-09-25 NOTE — PROGRESS NOTES
Palliative Care  made a follow up visit to offer support to Patient. Patient was sitting up in her bed with Daughter present. Patient reports that she is feeling much better. Daughter reports that Patient will be transferring to [facility] on tomorrow for rehab and hopefully Patient can return home. Patient thankful for  visit. Neha Luis will continue to follow up.       Electronically signed by Nathalia Saenz on 9/25/2017 at 4:39 PM

## 2017-09-25 NOTE — PROGRESS NOTES
Femoral central line removed per [facility] procedures. Patient instructed to lie flat for 30 minutes following procedure. Patient and family verbalized understanding and agreed to comply.   Electronically signed by Sandro Nicole RN on 9/25/2017 at 5:07 PM

## 2017-09-25 NOTE — PROGRESS NOTES
Verbally instructed by Mane MONROE to pull femoral line.   Electronically signed by Tao Chao RN on 9/25/2017 at 4:56 PM

## 2017-09-26 VITALS
BODY MASS INDEX: 15.86 KG/M2 | TEMPERATURE: 98 F | HEIGHT: 61 IN | HEART RATE: 71 BPM | DIASTOLIC BLOOD PRESSURE: 67 MMHG | SYSTOLIC BLOOD PRESSURE: 125 MMHG | RESPIRATION RATE: 18 BRPM | OXYGEN SATURATION: 97 % | WEIGHT: 84 LBS

## 2017-09-26 PROCEDURE — 6370000000 HC RX 637 (ALT 250 FOR IP): Performed by: PHYSICIAN ASSISTANT

## 2017-09-26 PROCEDURE — 2580000003 HC RX 258: Performed by: FAMILY MEDICINE

## 2017-09-26 PROCEDURE — 99232 SBSQ HOSP IP/OBS MODERATE 35: CPT | Performed by: INTERNAL MEDICINE

## 2017-09-26 PROCEDURE — 6370000000 HC RX 637 (ALT 250 FOR IP): Performed by: FAMILY MEDICINE

## 2017-09-26 RX ADMIN — PANTOPRAZOLE SODIUM 40 MG: 40 TABLET, DELAYED RELEASE ORAL at 05:48

## 2017-09-26 RX ADMIN — LEVOTHYROXINE SODIUM 100 MCG: 100 TABLET ORAL at 05:48

## 2017-09-26 RX ADMIN — METOPROLOL SUCCINATE 25 MG: 25 TABLET, EXTENDED RELEASE ORAL at 09:24

## 2017-09-26 RX ADMIN — MULTIPLE VITAMINS W/ MINERALS TAB 1 TABLET: TAB at 09:24

## 2017-09-26 RX ADMIN — MIDODRINE HYDROCHLORIDE 5 MG: 5 TABLET ORAL at 09:24

## 2017-09-26 RX ADMIN — Medication 10 ML: at 09:24

## 2017-09-26 RX ADMIN — APIXABAN 2.5 MG: 2.5 TABLET, FILM COATED ORAL at 09:24

## 2017-09-26 RX ADMIN — TOPIRAMATE 25 MG: 25 TABLET ORAL at 09:24

## 2017-09-26 NOTE — PROGRESS NOTES
[facility]  Progress Note                            Date:  9/26/2017  Patient: Jayce Santoyo  Admission:  9/20/2017  4:40 PM  Admit DX: Dysarthria [R47.1]  Age:  80 y. o., 10/19/1933     LOS: 6 days     Reason for evaluation:   atrial fibrillation      SUBJECTIVE:    The patient was seen and examined. Notes and labs reviewed. There were not complications over night. Plans to go to [facility] today. No arrhythmias noted. OBJECTIVE:    Telemetry: Sinus  /67  Pulse 71  Temp 98 °F (36.7 °C)  Resp 18  Ht 5' 1\" (1.549 m)  Wt 84 lb (38.1 kg)  SpO2 97%  BMI 15.87 kg/m2    Intake/Output Summary (Last 24 hours) at 09/26/17 1016  Last data filed at 09/26/17 0801   Gross per 24 hour   Intake             1040 ml   Output             1000 ml   Net               40 ml           Labs:   CBC:   Recent Labs      09/24/17   0115   WBC  5.2   HGB  9.6*   HCT  28.2*   PLT  193     BMP: Recent Labs      09/24/17   0115   NA  129*   K  4.1   CO2  21*   BUN  15   CREATININE  0.7   LABGLOM  >60   GLUCOSE  64*     BNP: No results for input(s): BNP in the last 72 hours. PT/INR: No results for input(s): PROTIME, INR in the last 72 hours. APTT:No results for input(s): APTT in the last 72 hours. CARDIAC ENZYMES:No results for input(s): CKTOTAL, CKMB, CKMBINDEX, TROPONINI in the last 72 hours. FASTING LIPID PANEL:  Lab Results   Component Value Date    HDL 73 09/19/2017    LDLCALC 43 09/19/2017    TRIG 48 09/19/2017     LIVER PROFILE:No results for input(s): AST, ALT, LABALBU in the last 72 hours. NURSE:  Jasmin Taylorr, RN        Reason for initial evaluation    Atrial fibrillation      History of the Present Illness and Today's Current Status: Ready to go to [facility] today. Additionally, negative for chest pain, dyspnea and irregular heart beat.     The patient was seen today for these cardiology problems:      Specialty Problems        Cardiology Problems    Paroxysmal atrial fibrillation (HCC)        Hypotension        Syncope and collapse        Hemispheric carotid artery syndrome        * (Principal)TIA (transient ischemic attack)                 PFSH:  Any new information:  no       Change:  no      Review of Systems:    General:      Complaint / Symptom Yes / No / Description if Yes       Fatigue No   Weight gain No   Insomnia N/A       Respiratory:        Complaint / Symptom Yes / No / Description if Yes       Cough No   Horseness N/A       Cardiovascular:    Complaint / Symptom Yes / No / Description if Yes       Chest Pain No   Shortness of Air / Orthopnea No   Presyncope / Syncope No   Palpitations No         Physical Examination:    /67  Pulse 71  Temp 98 °F (36.7 °C)  Resp 18  Ht 5' 1\" (1.549 m)  Wt 84 lb (38.1 kg)  SpO2 97%  BMI 15.87 kg/m2    GENERAL - well developed and well nourished    HEENT   PERRLA, Hearing appears normal, conjunctiva and lids are normal, ears and nose appear normal  NECK - no thyromegaly, no JVD, trachea is in the midline  CARDIOVASCULAR  PMI is in the left mid line clavicular position, Normal S1 and S2 with a grade 1/6 systolic murmur. No S3 or S4    PULMONARY  no respiratory distress. no wheezes and rales.    ABDOMEN   soft, non tender, no rebound, no hepatomegaly or splenomegaly  MUSCULOSKELETAL   Prone/Supine, digitals and nails are without clubbing or cyanosis  EXTREMITIES - trace edema  NEUROLOGIC - cranial nerves, II-XII, are normal  SKIN - turgor is normal, no rash  PSYCHIATRIC - normal mood and affect, alert and orientated x 3, judgement and insight appear appropriate       Current Inpatient Medications:   apixaban  2.5 mg Oral BID    pantoprazole  40 mg Oral BID AC    midodrine  5 mg Oral BID    levothyroxine  100 mcg Oral Daily    topiramate  25 mg Oral Daily    atorvastatin  20 mg Oral Nightly    oxybutynin  10 mg Oral Nightly    therapeutic multivitamin-minerals  1 tablet Oral Daily    metoprolol succinate  25 mg Oral Daily    sodium chloride flush  10 mL Intravenous 2 times per day       IV Infusions (if any):         LABORATORY EVALUATION & TESTING:    I have personally reviewed and interpreted the results of the following diagnostic testing and noted in the nurse's note above      EKG and or Telemetry:  which was personally reviewed me:  Sinus rhythm,   Pulse Readings from Last 1 Encounters:   09/26/17 71           ALL THE CARDIOLOGY PROBLEMS ARE LISTED ABOVE; HOWEVER, THE FOLLOWING SPECIFIC CARDIAC PROBLEMS WERE ADDRESSED AND TREATED DURING [facility] VISIT TODAY:                              Cardiac Specific Problem / Diagnosis    Discussion and Data Reviewed Diagnostic Procedures Ordered Management Options Selected                       1. Paroxymal atrial fibrillation  show no change Review and summation of old records:      No further episodes, reviewed telemetry, currently in NSR No Continue current medications:       Yes                       2. Hypertension Initial presentation during this evaluation Patient has a history of hypertension, which is managed and is on current therapy. The blood pressure history is:  Systolic (94SCE), FTB:670 , Min:102 , GTM:370    Diastolic (05TVL), TRT:97, Min:51, Max:67    No Continue current medications:      Yes                       3. syncope Initial presentation during this evaluation No further spells No Continue current medications:         Yes               PLAN:    1. Continue present medications except for changes as noted above  2. Continue to monitor rhythm  3. Further orders per clinical course. 4. Ok to SNF             Discussed with patient and adult child and nursing.       Electronically signed by Lazaro Ulloa MD on 9/26/17 at 10:39 [id number] [facility]

## 2017-12-20 ENCOUNTER — HOSPITAL ENCOUNTER (OUTPATIENT)
Dept: GENERAL RADIOLOGY | Age: 82
Discharge: HOME | End: 2017-12-20
Payer: MEDICARE

## 2017-12-20 DIAGNOSIS — M25.511 ACUTE PAIN OF RIGHT SHOULDER: ICD-10-CM

## 2017-12-20 PROCEDURE — 73030 X-RAY EXAM OF SHOULDER: CPT

## 2018-02-01 ENCOUNTER — APPOINTMENT (OUTPATIENT)
Dept: CT IMAGING | Age: 83
DRG: 377 | End: 2018-02-01
Payer: MEDICARE

## 2018-02-01 ENCOUNTER — APPOINTMENT (OUTPATIENT)
Dept: GENERAL RADIOLOGY | Age: 83
DRG: 377 | End: 2018-02-01
Payer: MEDICARE

## 2018-02-01 ENCOUNTER — HOSPITAL ENCOUNTER (INPATIENT)
Age: 83
LOS: 5 days | Discharge: HOME | DRG: 377 | End: 2018-02-06
Attending: EMERGENCY MEDICINE | Admitting: FAMILY MEDICINE
Payer: MEDICARE

## 2018-02-01 DIAGNOSIS — D64.9 SYMPTOMATIC ANEMIA: ICD-10-CM

## 2018-02-01 DIAGNOSIS — R42 DIZZINESS: ICD-10-CM

## 2018-02-01 DIAGNOSIS — K92.2 GASTROINTESTINAL HEMORRHAGE, UNSPECIFIED GASTROINTESTINAL HEMORRHAGE TYPE: Primary | ICD-10-CM

## 2018-02-01 DIAGNOSIS — R55 SYNCOPE AND COLLAPSE: ICD-10-CM

## 2018-02-01 LAB
ABO/RH: NORMAL
ALBUMIN SERPL-MCNC: 3.2 G/DL (ref 3.5–5.2)
ALP BLD-CCNC: 61 U/L (ref 35–104)
ALT SERPL-CCNC: 12 U/L (ref 5–33)
ANION GAP SERPL CALCULATED.3IONS-SCNC: 9 MMOL/L (ref 7–19)
ANTIBODY SCREEN: NORMAL
APTT: 32 SEC (ref 26–36.2)
AST SERPL-CCNC: 18 U/L (ref 5–32)
BACTERIA: NEGATIVE /HPF
BASOPHILS ABSOLUTE: 0 K/UL (ref 0–0.2)
BASOPHILS RELATIVE PERCENT: 0.2 % (ref 0–1)
BILIRUB SERPL-MCNC: <0.2 MG/DL (ref 0.2–1.2)
BILIRUBIN URINE: NEGATIVE
BLOOD BANK DISPENSE STATUS: NORMAL
BLOOD BANK DISPENSE STATUS: NORMAL
BLOOD BANK PRODUCT CODE: NORMAL
BLOOD BANK PRODUCT CODE: NORMAL
BLOOD, URINE: NEGATIVE
BPU ID: NORMAL
BPU ID: NORMAL
BUN BLDV-MCNC: 38 MG/DL (ref 8–23)
CALCIUM SERPL-MCNC: 8 MG/DL (ref 8.8–10.2)
CHLORIDE BLD-SCNC: 100 MMOL/L (ref 98–111)
CLARITY: ABNORMAL
CO2: 22 MMOL/L (ref 22–29)
COLOR: YELLOW
CREAT SERPL-MCNC: 0.8 MG/DL (ref 0.5–0.9)
DESCRIPTION BLOOD BANK: NORMAL
DESCRIPTION BLOOD BANK: NORMAL
EOSINOPHILS ABSOLUTE: 0.1 K/UL (ref 0–0.6)
EOSINOPHILS RELATIVE PERCENT: 1.1 % (ref 0–5)
EPITHELIAL CELLS, UA: 1 /HPF (ref 0–5)
FERRITIN: 17.2 NG/ML (ref 13–150)
GFR NON-AFRICAN AMERICAN: >60
GLUCOSE BLD-MCNC: 106 MG/DL (ref 74–109)
GLUCOSE URINE: NEGATIVE MG/DL
HCT VFR BLD CALC: 17.6 % (ref 37–47)
HCT VFR BLD CALC: 29.5 % (ref 37–47)
HEMOGLOBIN: 5.3 G/DL (ref 12–16)
HEMOGLOBIN: 9.9 G/DL (ref 12–16)
HYALINE CASTS: 1 /HPF (ref 0–8)
INR BLD: 1.39 (ref 0.88–1.18)
IRON SATURATION: 13 % (ref 14–50)
IRON: 46 UG/DL (ref 37–145)
KETONES, URINE: NEGATIVE MG/DL
LEUKOCYTE ESTERASE, URINE: ABNORMAL
LYMPHOCYTES ABSOLUTE: 1.9 K/UL (ref 1.1–4.5)
LYMPHOCYTES RELATIVE PERCENT: 35.4 % (ref 20–40)
MCH RBC QN AUTO: 26.6 PG (ref 27–31)
MCHC RBC AUTO-ENTMCNC: 30.1 G/DL (ref 33–37)
MCV RBC AUTO: 88.4 FL (ref 81–99)
MONOCYTES ABSOLUTE: 0.6 K/UL (ref 0–0.9)
MONOCYTES RELATIVE PERCENT: 11.4 % (ref 0–10)
NEUTROPHILS ABSOLUTE: 2.8 K/UL (ref 1.5–7.5)
NEUTROPHILS RELATIVE PERCENT: 51.5 % (ref 50–65)
NITRITE, URINE: NEGATIVE
PDW BLD-RTO: 15.3 % (ref 11.5–14.5)
PERFORMED ON: NORMAL
PH UA: 6.5
PLATELET # BLD: 210 K/UL (ref 130–400)
PMV BLD AUTO: 10.3 FL (ref 9.4–12.3)
POC TROPONIN I: 0.01 NG/ML (ref 0–0.08)
POTASSIUM SERPL-SCNC: 4.6 MMOL/L (ref 3.5–5)
PROTEIN UA: NEGATIVE MG/DL
PROTHROMBIN TIME: 17 SEC (ref 12–14.6)
RBC # BLD: 1.99 M/UL (ref 4.2–5.4)
RBC UA: 2 /HPF (ref 0–4)
SODIUM BLD-SCNC: 131 MMOL/L (ref 136–145)
SPECIFIC GRAVITY UA: 1.01
TOTAL IRON BINDING CAPACITY: 364 UG/DL (ref 250–400)
TOTAL PROTEIN: 5.4 G/DL (ref 6.6–8.7)
TROPONIN: <0.01 NG/ML (ref 0–0.03)
UROBILINOGEN, URINE: 0.2 E.U./DL
WBC # BLD: 5.4 K/UL (ref 4.8–10.8)
WBC UA: 5 /HPF (ref 0–5)

## 2018-02-01 PROCEDURE — 84484 ASSAY OF TROPONIN QUANT: CPT

## 2018-02-01 PROCEDURE — 81001 URINALYSIS AUTO W/SCOPE: CPT

## 2018-02-01 PROCEDURE — 70450 CT HEAD/BRAIN W/O DYE: CPT

## 2018-02-01 PROCEDURE — 86900 BLOOD TYPING SEROLOGIC ABO: CPT

## 2018-02-01 PROCEDURE — C9113 INJ PANTOPRAZOLE SODIUM, VIA: HCPCS | Performed by: EMERGENCY MEDICINE

## 2018-02-01 PROCEDURE — 85014 HEMATOCRIT: CPT

## 2018-02-01 PROCEDURE — 93005 ELECTROCARDIOGRAM TRACING: CPT

## 2018-02-01 PROCEDURE — 36430 TRANSFUSION BLD/BLD COMPNT: CPT

## 2018-02-01 PROCEDURE — 85025 COMPLETE CBC W/AUTO DIFF WBC: CPT

## 2018-02-01 PROCEDURE — 99285 EMERGENCY DEPT VISIT HI MDM: CPT | Performed by: EMERGENCY MEDICINE

## 2018-02-01 PROCEDURE — 99285 EMERGENCY DEPT VISIT HI MDM: CPT

## 2018-02-01 PROCEDURE — 86850 RBC ANTIBODY SCREEN: CPT

## 2018-02-01 PROCEDURE — 36415 COLL VENOUS BLD VENIPUNCTURE: CPT

## 2018-02-01 PROCEDURE — 85730 THROMBOPLASTIN TIME PARTIAL: CPT

## 2018-02-01 PROCEDURE — P9016 RBC LEUKOCYTES REDUCED: HCPCS

## 2018-02-01 PROCEDURE — 1210000000 HC MED SURG R&B

## 2018-02-01 PROCEDURE — 86901 BLOOD TYPING SEROLOGIC RH(D): CPT

## 2018-02-01 PROCEDURE — 80053 COMPREHEN METABOLIC PANEL: CPT

## 2018-02-01 PROCEDURE — 96374 THER/PROPH/DIAG INJ IV PUSH: CPT

## 2018-02-01 PROCEDURE — 83540 ASSAY OF IRON: CPT

## 2018-02-01 PROCEDURE — 85610 PROTHROMBIN TIME: CPT

## 2018-02-01 PROCEDURE — 6360000002 HC RX W HCPCS: Performed by: EMERGENCY MEDICINE

## 2018-02-01 PROCEDURE — 83550 IRON BINDING TEST: CPT

## 2018-02-01 PROCEDURE — 82728 ASSAY OF FERRITIN: CPT

## 2018-02-01 PROCEDURE — 85018 HEMOGLOBIN: CPT

## 2018-02-01 PROCEDURE — 2580000003 HC RX 258: Performed by: EMERGENCY MEDICINE

## 2018-02-01 PROCEDURE — 71045 X-RAY EXAM CHEST 1 VIEW: CPT

## 2018-02-01 PROCEDURE — 2580000003 HC RX 258: Performed by: FAMILY MEDICINE

## 2018-02-01 RX ORDER — SODIUM CHLORIDE 0.9 % (FLUSH) 0.9 %
10 SYRINGE (ML) INJECTION EVERY 12 HOURS SCHEDULED
Status: DISCONTINUED | OUTPATIENT
Start: 2018-02-01 | End: 2018-02-05

## 2018-02-01 RX ORDER — 0.9 % SODIUM CHLORIDE 0.9 %
500 INTRAVENOUS SOLUTION INTRAVENOUS ONCE
Status: COMPLETED | OUTPATIENT
Start: 2018-02-01 | End: 2018-02-01

## 2018-02-01 RX ORDER — SODIUM CHLORIDE 0.9 % (FLUSH) 0.9 %
10 SYRINGE (ML) INJECTION PRN
Status: DISCONTINUED | OUTPATIENT
Start: 2018-02-01 | End: 2018-02-06 | Stop reason: HOSPADM

## 2018-02-01 RX ORDER — PANTOPRAZOLE SODIUM 40 MG/10ML
40 INJECTION, POWDER, LYOPHILIZED, FOR SOLUTION INTRAVENOUS ONCE
Status: COMPLETED | OUTPATIENT
Start: 2018-02-01 | End: 2018-02-01

## 2018-02-01 RX ORDER — ASPIRIN 81 MG/1
81 TABLET ORAL DAILY
Status: ON HOLD | COMMUNITY
End: 2018-05-02 | Stop reason: ALTCHOICE

## 2018-02-01 RX ORDER — SODIUM CHLORIDE 9 MG/ML
INJECTION, SOLUTION INTRAVENOUS CONTINUOUS
Status: DISCONTINUED | OUTPATIENT
Start: 2018-02-01 | End: 2018-02-06 | Stop reason: HOSPADM

## 2018-02-01 RX ORDER — ACETAMINOPHEN 325 MG/1
650 TABLET ORAL EVERY 4 HOURS PRN
Status: DISCONTINUED | OUTPATIENT
Start: 2018-02-01 | End: 2018-02-06 | Stop reason: HOSPADM

## 2018-02-01 RX ORDER — 0.9 % SODIUM CHLORIDE 0.9 %
250 INTRAVENOUS SOLUTION INTRAVENOUS ONCE
Status: COMPLETED | OUTPATIENT
Start: 2018-02-01 | End: 2018-02-01

## 2018-02-01 RX ADMIN — PANTOPRAZOLE SODIUM 40 MG: 40 INJECTION, POWDER, FOR SOLUTION INTRAVENOUS at 13:56

## 2018-02-01 RX ADMIN — SODIUM CHLORIDE: 9 INJECTION, SOLUTION INTRAVENOUS at 20:04

## 2018-02-01 RX ADMIN — SODIUM CHLORIDE 500 ML: 9 INJECTION, SOLUTION INTRAVENOUS at 11:17

## 2018-02-01 RX ADMIN — SODIUM CHLORIDE 250 ML: 9 INJECTION, SOLUTION INTRAVENOUS at 12:30

## 2018-02-01 RX ADMIN — Medication 10 ML: at 20:05

## 2018-02-01 ASSESSMENT — ENCOUNTER SYMPTOMS
SORE THROAT: 0
RHINORRHEA: 0
SHORTNESS OF BREATH: 0
DIARRHEA: 0
ABDOMINAL PAIN: 0
COUGH: 0
VOMITING: 0
BACK PAIN: 0
NAUSEA: 0

## 2018-02-02 LAB
HCT VFR BLD CALC: 29.9 % (ref 37–47)
HCT VFR BLD CALC: 32.2 % (ref 37–47)
HCT VFR BLD CALC: 33.2 % (ref 37–47)
HEMOGLOBIN: 10.7 G/DL (ref 12–16)
HEMOGLOBIN: 10.8 G/DL (ref 12–16)
HEMOGLOBIN: 9.8 G/DL (ref 12–16)

## 2018-02-02 PROCEDURE — 6370000000 HC RX 637 (ALT 250 FOR IP): Performed by: FAMILY MEDICINE

## 2018-02-02 PROCEDURE — 99222 1ST HOSP IP/OBS MODERATE 55: CPT | Performed by: INTERNAL MEDICINE

## 2018-02-02 PROCEDURE — 1210000000 HC MED SURG R&B

## 2018-02-02 PROCEDURE — 2580000003 HC RX 258: Performed by: FAMILY MEDICINE

## 2018-02-02 PROCEDURE — 85018 HEMOGLOBIN: CPT

## 2018-02-02 PROCEDURE — 6360000002 HC RX W HCPCS: Performed by: FAMILY MEDICINE

## 2018-02-02 PROCEDURE — 85014 HEMATOCRIT: CPT

## 2018-02-02 PROCEDURE — 36415 COLL VENOUS BLD VENIPUNCTURE: CPT

## 2018-02-02 PROCEDURE — C9113 INJ PANTOPRAZOLE SODIUM, VIA: HCPCS | Performed by: FAMILY MEDICINE

## 2018-02-02 RX ORDER — TROSPIUM CHLORIDE 20 MG/1
20 TABLET, FILM COATED ORAL NIGHTLY
Status: DISCONTINUED | OUTPATIENT
Start: 2018-02-02 | End: 2018-02-06 | Stop reason: HOSPADM

## 2018-02-02 RX ORDER — MIDODRINE HYDROCHLORIDE 5 MG/1
5 TABLET ORAL 2 TIMES DAILY
Status: DISCONTINUED | OUTPATIENT
Start: 2018-02-02 | End: 2018-02-06 | Stop reason: HOSPADM

## 2018-02-02 RX ORDER — METOPROLOL SUCCINATE 25 MG/1
25 TABLET, EXTENDED RELEASE ORAL DAILY
Status: DISCONTINUED | OUTPATIENT
Start: 2018-02-02 | End: 2018-02-06 | Stop reason: HOSPADM

## 2018-02-02 RX ORDER — ATORVASTATIN CALCIUM 20 MG/1
20 TABLET, FILM COATED ORAL NIGHTLY
Status: DISCONTINUED | OUTPATIENT
Start: 2018-02-02 | End: 2018-02-06 | Stop reason: HOSPADM

## 2018-02-02 RX ORDER — TOPIRAMATE 25 MG/1
25 TABLET ORAL DAILY
Status: DISCONTINUED | OUTPATIENT
Start: 2018-02-02 | End: 2018-02-06 | Stop reason: HOSPADM

## 2018-02-02 RX ORDER — LEVOTHYROXINE SODIUM 0.1 MG/1
100 TABLET ORAL DAILY
Status: DISCONTINUED | OUTPATIENT
Start: 2018-02-02 | End: 2018-02-06 | Stop reason: HOSPADM

## 2018-02-02 RX ADMIN — MIDODRINE HYDROCHLORIDE 5 MG: 5 TABLET ORAL at 10:25

## 2018-02-02 RX ADMIN — TOPIRAMATE 25 MG: 25 TABLET ORAL at 10:25

## 2018-02-02 RX ADMIN — METOPROLOL SUCCINATE 25 MG: 25 TABLET, EXTENDED RELEASE ORAL at 10:25

## 2018-02-02 RX ADMIN — MIDODRINE HYDROCHLORIDE 5 MG: 5 TABLET ORAL at 16:47

## 2018-02-02 RX ADMIN — LEVOTHYROXINE SODIUM 100 MCG: 100 TABLET ORAL at 10:25

## 2018-02-02 RX ADMIN — ATORVASTATIN CALCIUM 20 MG: 20 TABLET, FILM COATED ORAL at 20:34

## 2018-02-02 RX ADMIN — Medication 8 MG/HR: at 10:24

## 2018-02-02 RX ADMIN — TROSPIUM CHLORIDE 20 MG: 20 TABLET ORAL at 20:34

## 2018-02-02 RX ADMIN — SODIUM CHLORIDE: 9 INJECTION, SOLUTION INTRAVENOUS at 10:21

## 2018-02-02 ASSESSMENT — PAIN SCALES - GENERAL
PAINLEVEL_OUTOF10: 0
PAINLEVEL_OUTOF10: 0

## 2018-02-03 LAB
ANION GAP SERPL CALCULATED.3IONS-SCNC: 14 MMOL/L (ref 7–19)
BASOPHILS ABSOLUTE: 0 K/UL (ref 0–0.2)
BASOPHILS RELATIVE PERCENT: 0.5 % (ref 0–1)
BUN BLDV-MCNC: 18 MG/DL (ref 8–23)
CALCIUM SERPL-MCNC: 8 MG/DL (ref 8.8–10.2)
CHLORIDE BLD-SCNC: 100 MMOL/L (ref 98–111)
CO2: 18 MMOL/L (ref 22–29)
CREAT SERPL-MCNC: 0.6 MG/DL (ref 0.5–0.9)
EOSINOPHILS ABSOLUTE: 0.3 K/UL (ref 0–0.6)
EOSINOPHILS RELATIVE PERCENT: 4.2 % (ref 0–5)
GFR NON-AFRICAN AMERICAN: >60
GLUCOSE BLD-MCNC: 74 MG/DL (ref 74–109)
HCT VFR BLD CALC: 31 % (ref 37–47)
HCT VFR BLD CALC: 31.3 % (ref 37–47)
HCT VFR BLD CALC: 32.3 % (ref 37–47)
HCT VFR BLD CALC: 34.4 % (ref 37–47)
HEMOGLOBIN: 10.1 G/DL (ref 12–16)
HEMOGLOBIN: 10.5 G/DL (ref 12–16)
HEMOGLOBIN: 11 G/DL (ref 12–16)
HEMOGLOBIN: 9.9 G/DL (ref 12–16)
LYMPHOCYTES ABSOLUTE: 1.8 K/UL (ref 1.1–4.5)
LYMPHOCYTES RELATIVE PERCENT: 27.7 % (ref 20–40)
MCH RBC QN AUTO: 28 PG (ref 27–31)
MCHC RBC AUTO-ENTMCNC: 31.6 G/DL (ref 33–37)
MCV RBC AUTO: 88.7 FL (ref 81–99)
MONOCYTES ABSOLUTE: 0.7 K/UL (ref 0–0.9)
MONOCYTES RELATIVE PERCENT: 10.1 % (ref 0–10)
NEUTROPHILS ABSOLUTE: 3.7 K/UL (ref 1.5–7.5)
NEUTROPHILS RELATIVE PERCENT: 57.3 % (ref 50–65)
PDW BLD-RTO: 15.2 % (ref 11.5–14.5)
PLATELET # BLD: 179 K/UL (ref 130–400)
PMV BLD AUTO: 10.9 FL (ref 9.4–12.3)
POTASSIUM SERPL-SCNC: 4 MMOL/L (ref 3.5–5)
RBC # BLD: 3.53 M/UL (ref 4.2–5.4)
SODIUM BLD-SCNC: 132 MMOL/L (ref 136–145)
WBC # BLD: 6.4 K/UL (ref 4.8–10.8)

## 2018-02-03 PROCEDURE — 85014 HEMATOCRIT: CPT

## 2018-02-03 PROCEDURE — 2580000003 HC RX 258: Performed by: FAMILY MEDICINE

## 2018-02-03 PROCEDURE — 99232 SBSQ HOSP IP/OBS MODERATE 35: CPT | Performed by: INTERNAL MEDICINE

## 2018-02-03 PROCEDURE — C9113 INJ PANTOPRAZOLE SODIUM, VIA: HCPCS | Performed by: FAMILY MEDICINE

## 2018-02-03 PROCEDURE — 85025 COMPLETE CBC W/AUTO DIFF WBC: CPT

## 2018-02-03 PROCEDURE — 36415 COLL VENOUS BLD VENIPUNCTURE: CPT

## 2018-02-03 PROCEDURE — 80048 BASIC METABOLIC PNL TOTAL CA: CPT

## 2018-02-03 PROCEDURE — 1210000000 HC MED SURG R&B

## 2018-02-03 PROCEDURE — 85018 HEMOGLOBIN: CPT

## 2018-02-03 PROCEDURE — 6370000000 HC RX 637 (ALT 250 FOR IP): Performed by: FAMILY MEDICINE

## 2018-02-03 PROCEDURE — 6360000002 HC RX W HCPCS: Performed by: FAMILY MEDICINE

## 2018-02-03 RX ADMIN — MIDODRINE HYDROCHLORIDE 5 MG: 5 TABLET ORAL at 08:55

## 2018-02-03 RX ADMIN — Medication 8 MG/HR: at 19:06

## 2018-02-03 RX ADMIN — Medication 8 MG/HR: at 11:40

## 2018-02-03 RX ADMIN — METOPROLOL SUCCINATE 25 MG: 25 TABLET, EXTENDED RELEASE ORAL at 08:55

## 2018-02-03 RX ADMIN — MIDODRINE HYDROCHLORIDE 5 MG: 5 TABLET ORAL at 17:25

## 2018-02-03 RX ADMIN — ATORVASTATIN CALCIUM 20 MG: 20 TABLET, FILM COATED ORAL at 22:01

## 2018-02-03 RX ADMIN — TOPIRAMATE 25 MG: 25 TABLET ORAL at 08:55

## 2018-02-03 RX ADMIN — TROSPIUM CHLORIDE 20 MG: 20 TABLET ORAL at 22:01

## 2018-02-03 RX ADMIN — LEVOTHYROXINE SODIUM 100 MCG: 100 TABLET ORAL at 06:11

## 2018-02-03 ASSESSMENT — PAIN SCALES - GENERAL: PAINLEVEL_OUTOF10: 0

## 2018-02-04 LAB
HCT VFR BLD CALC: 30.8 % (ref 37–47)
HCT VFR BLD CALC: 32.8 % (ref 37–47)
HCT VFR BLD CALC: 33.1 % (ref 37–47)
HCT VFR BLD CALC: 33.1 % (ref 37–47)
HEMOGLOBIN: 10.4 G/DL (ref 12–16)
HEMOGLOBIN: 10.5 G/DL (ref 12–16)
HEMOGLOBIN: 10.6 G/DL (ref 12–16)
HEMOGLOBIN: 9.9 G/DL (ref 12–16)

## 2018-02-04 PROCEDURE — C9113 INJ PANTOPRAZOLE SODIUM, VIA: HCPCS | Performed by: FAMILY MEDICINE

## 2018-02-04 PROCEDURE — 36415 COLL VENOUS BLD VENIPUNCTURE: CPT

## 2018-02-04 PROCEDURE — 6370000000 HC RX 637 (ALT 250 FOR IP): Performed by: FAMILY MEDICINE

## 2018-02-04 PROCEDURE — 6360000002 HC RX W HCPCS: Performed by: FAMILY MEDICINE

## 2018-02-04 PROCEDURE — 2580000003 HC RX 258: Performed by: FAMILY MEDICINE

## 2018-02-04 PROCEDURE — 85014 HEMATOCRIT: CPT

## 2018-02-04 PROCEDURE — 85018 HEMOGLOBIN: CPT

## 2018-02-04 PROCEDURE — 1210000000 HC MED SURG R&B

## 2018-02-04 PROCEDURE — 99232 SBSQ HOSP IP/OBS MODERATE 35: CPT | Performed by: INTERNAL MEDICINE

## 2018-02-04 RX ADMIN — TOPIRAMATE 25 MG: 25 TABLET ORAL at 08:50

## 2018-02-04 RX ADMIN — MIDODRINE HYDROCHLORIDE 5 MG: 5 TABLET ORAL at 08:50

## 2018-02-04 RX ADMIN — Medication 10 ML: at 20:12

## 2018-02-04 RX ADMIN — ATORVASTATIN CALCIUM 20 MG: 20 TABLET, FILM COATED ORAL at 20:11

## 2018-02-04 RX ADMIN — TROSPIUM CHLORIDE 20 MG: 20 TABLET ORAL at 20:15

## 2018-02-04 RX ADMIN — Medication 8 MG/HR: at 08:50

## 2018-02-04 RX ADMIN — LEVOTHYROXINE SODIUM 100 MCG: 100 TABLET ORAL at 06:24

## 2018-02-04 RX ADMIN — MIDODRINE HYDROCHLORIDE 5 MG: 5 TABLET ORAL at 17:25

## 2018-02-04 RX ADMIN — Medication 8 MG/HR: at 20:33

## 2018-02-04 RX ADMIN — METOPROLOL SUCCINATE 25 MG: 25 TABLET, EXTENDED RELEASE ORAL at 08:50

## 2018-02-04 ASSESSMENT — PAIN SCALES - GENERAL
PAINLEVEL_OUTOF10: 0

## 2018-02-05 ENCOUNTER — ANESTHESIA EVENT (OUTPATIENT)
Dept: ENDOSCOPY | Age: 83
DRG: 377 | End: 2018-02-05
Payer: MEDICARE

## 2018-02-05 ENCOUNTER — ANESTHESIA (OUTPATIENT)
Dept: ENDOSCOPY | Age: 83
DRG: 377 | End: 2018-02-05
Payer: MEDICARE

## 2018-02-05 VITALS
OXYGEN SATURATION: 99 % | RESPIRATION RATE: 24 BRPM | DIASTOLIC BLOOD PRESSURE: 55 MMHG | SYSTOLIC BLOOD PRESSURE: 124 MMHG

## 2018-02-05 LAB
EKG P AXIS: 59 DEGREES
EKG P-R INTERVAL: 208 MS
EKG Q-T INTERVAL: 374 MS
EKG QRS DURATION: 106 MS
EKG QTC CALCULATION (BAZETT): 412 MS
EKG T AXIS: 64 DEGREES
HCT VFR BLD CALC: 29.8 % (ref 37–47)
HCT VFR BLD CALC: 30.8 % (ref 37–47)
HEMOGLOBIN: 9.5 G/DL (ref 12–16)
HEMOGLOBIN: 9.9 G/DL (ref 12–16)

## 2018-02-05 PROCEDURE — 2580000003 HC RX 258: Performed by: INTERNAL MEDICINE

## 2018-02-05 PROCEDURE — 6370000000 HC RX 637 (ALT 250 FOR IP): Performed by: FAMILY MEDICINE

## 2018-02-05 PROCEDURE — 6360000002 HC RX W HCPCS: Performed by: NURSE ANESTHETIST, CERTIFIED REGISTERED

## 2018-02-05 PROCEDURE — 2500000003 HC RX 250 WO HCPCS: Performed by: FAMILY MEDICINE

## 2018-02-05 PROCEDURE — 43235 EGD DIAGNOSTIC BRUSH WASH: CPT | Performed by: INTERNAL MEDICINE

## 2018-02-05 PROCEDURE — 2500000003 HC RX 250 WO HCPCS: Performed by: NURSE ANESTHETIST, CERTIFIED REGISTERED

## 2018-02-05 PROCEDURE — 3700000000 HC ANESTHESIA ATTENDED CARE: Performed by: INTERNAL MEDICINE

## 2018-02-05 PROCEDURE — 05HY33Z INSERTION OF INFUSION DEVICE INTO UPPER VEIN, PERCUTANEOUS APPROACH: ICD-10-PCS | Performed by: FAMILY MEDICINE

## 2018-02-05 PROCEDURE — C1751 CATH, INF, PER/CENT/MIDLINE: HCPCS

## 2018-02-05 PROCEDURE — 36415 COLL VENOUS BLD VENIPUNCTURE: CPT

## 2018-02-05 PROCEDURE — 6360000002 HC RX W HCPCS: Performed by: FAMILY MEDICINE

## 2018-02-05 PROCEDURE — 36569 INSJ PICC 5 YR+ W/O IMAGING: CPT

## 2018-02-05 PROCEDURE — 3609012800 HC EGD DIAGNOSTIC ONLY: Performed by: INTERNAL MEDICINE

## 2018-02-05 PROCEDURE — 7100000000 HC PACU RECOVERY - FIRST 15 MIN: Performed by: INTERNAL MEDICINE

## 2018-02-05 PROCEDURE — 85014 HEMATOCRIT: CPT

## 2018-02-05 PROCEDURE — C9113 INJ PANTOPRAZOLE SODIUM, VIA: HCPCS | Performed by: FAMILY MEDICINE

## 2018-02-05 PROCEDURE — 36584 COMPL RPLCMT PICC RS&I: CPT

## 2018-02-05 PROCEDURE — 85018 HEMOGLOBIN: CPT

## 2018-02-05 PROCEDURE — 2580000003 HC RX 258: Performed by: FAMILY MEDICINE

## 2018-02-05 PROCEDURE — 6370000000 HC RX 637 (ALT 250 FOR IP): Performed by: INTERNAL MEDICINE

## 2018-02-05 PROCEDURE — 1210000000 HC MED SURG R&B

## 2018-02-05 PROCEDURE — 0DJ08ZZ INSPECTION OF UPPER INTESTINAL TRACT, VIA NATURAL OR ARTIFICIAL OPENING ENDOSCOPIC: ICD-10-PCS | Performed by: INTERNAL MEDICINE

## 2018-02-05 RX ORDER — LIDOCAINE HYDROCHLORIDE 10 MG/ML
5 INJECTION, SOLUTION EPIDURAL; INFILTRATION; INTRACAUDAL; PERINEURAL ONCE
Status: COMPLETED | OUTPATIENT
Start: 2018-02-05 | End: 2018-02-05

## 2018-02-05 RX ORDER — 0.9 % SODIUM CHLORIDE 0.9 %
10 VIAL (ML) INJECTION EVERY 12 HOURS SCHEDULED
Status: DISCONTINUED | OUTPATIENT
Start: 2018-02-05 | End: 2018-02-05

## 2018-02-05 RX ORDER — 0.9 % SODIUM CHLORIDE 0.9 %
10 VIAL (ML) INJECTION EVERY 12 HOURS SCHEDULED
Status: DISCONTINUED | OUTPATIENT
Start: 2018-02-05 | End: 2018-02-06 | Stop reason: HOSPADM

## 2018-02-05 RX ORDER — POLYETHYLENE GLYCOL 3350 17 G/17G
238 POWDER, FOR SOLUTION ORAL ONCE
Status: COMPLETED | OUTPATIENT
Start: 2018-02-05 | End: 2018-02-05

## 2018-02-05 RX ORDER — LIDOCAINE HYDROCHLORIDE 10 MG/ML
INJECTION, SOLUTION EPIDURAL; INFILTRATION; INTRACAUDAL; PERINEURAL PRN
Status: DISCONTINUED | OUTPATIENT
Start: 2018-02-05 | End: 2018-02-05 | Stop reason: SDUPTHER

## 2018-02-05 RX ORDER — 0.9 % SODIUM CHLORIDE 0.9 %
10 VIAL (ML) INJECTION PRN
Status: DISCONTINUED | OUTPATIENT
Start: 2018-02-05 | End: 2018-02-05

## 2018-02-05 RX ORDER — SODIUM CHLORIDE 0.9 % (FLUSH) 0.9 %
10 SYRINGE (ML) INJECTION PRN
Status: DISCONTINUED | OUTPATIENT
Start: 2018-02-05 | End: 2018-02-06 | Stop reason: HOSPADM

## 2018-02-05 RX ORDER — PROPOFOL 10 MG/ML
INJECTION, EMULSION INTRAVENOUS PRN
Status: DISCONTINUED | OUTPATIENT
Start: 2018-02-05 | End: 2018-02-05 | Stop reason: SDUPTHER

## 2018-02-05 RX ORDER — SODIUM CHLORIDE 0.9 % (FLUSH) 0.9 %
10 SYRINGE (ML) INJECTION EVERY 12 HOURS SCHEDULED
Status: DISCONTINUED | OUTPATIENT
Start: 2018-02-05 | End: 2018-02-06 | Stop reason: HOSPADM

## 2018-02-05 RX ORDER — 0.9 % SODIUM CHLORIDE 0.9 %
10 VIAL (ML) INJECTION PRN
Status: DISCONTINUED | OUTPATIENT
Start: 2018-02-05 | End: 2018-02-06 | Stop reason: HOSPADM

## 2018-02-05 RX ADMIN — MAGESIUM CITRATE 296 ML: 1.75 LIQUID ORAL at 17:14

## 2018-02-05 RX ADMIN — LIDOCAINE HYDROCHLORIDE 5 ML: 10 INJECTION, SOLUTION EPIDURAL; INFILTRATION; INTRACAUDAL; PERINEURAL at 11:12

## 2018-02-05 RX ADMIN — ATORVASTATIN CALCIUM 20 MG: 20 TABLET, FILM COATED ORAL at 20:20

## 2018-02-05 RX ADMIN — PROPOFOL 50 MG: 10 INJECTION, EMULSION INTRAVENOUS at 11:12

## 2018-02-05 RX ADMIN — BISACODYL 10 MG: 5 TABLET, COATED ORAL at 17:13

## 2018-02-05 RX ADMIN — TROSPIUM CHLORIDE 20 MG: 20 TABLET ORAL at 20:20

## 2018-02-05 RX ADMIN — Medication 8 MG/HR: at 10:19

## 2018-02-05 RX ADMIN — SODIUM CHLORIDE: 9 INJECTION, SOLUTION INTRAVENOUS at 14:57

## 2018-02-05 RX ADMIN — MIDODRINE HYDROCHLORIDE 5 MG: 5 TABLET ORAL at 17:13

## 2018-02-05 RX ADMIN — Medication 10 ML: at 10:18

## 2018-02-05 RX ADMIN — POLYETHYLENE GLYCOL 3350 238 G: 17 POWDER, FOR SOLUTION ORAL at 18:01

## 2018-02-05 RX ADMIN — LIDOCAINE HYDROCHLORIDE 5 ML: 10 INJECTION, SOLUTION EPIDURAL; INFILTRATION; INTRACAUDAL; PERINEURAL at 10:21

## 2018-02-05 RX ADMIN — Medication 10 ML: at 10:19

## 2018-02-05 ASSESSMENT — PAIN SCALES - GENERAL
PAINLEVEL_OUTOF10: 0

## 2018-02-05 NOTE — ANESTHESIA PRE PROCEDURE
fibrillation (HCC) I48.0    TIA (transient ischemic attack) G45.9    Dysarthria R47.1    Hemispheric carotid artery syndrome G45.1    Weight loss, unintentional R63.4    Transient alteration of awareness R40.4    GI bleed K92.2       Past Medical History:        Diagnosis Date    Anxiety     GERD (gastroesophageal reflux disease)     Macular degeneration     Palliative care encounter     Pneumonia     Weakness        Past Surgical History:        Procedure Laterality Date    CATARACT REMOVAL WITH IMPLANT  10/2015    right eye    CHOLECYSTECTOMY      COLON SURGERY      straighten kink    HIP SURGERY      right replacement       Social History:    Social History   Substance Use Topics    Smoking status: Never Smoker    Smokeless tobacco: Not on file    Alcohol use No                                Counseling given: Not Answered      Vital Signs (Current):   Vitals:    02/05/18 0022 02/05/18 0756 02/05/18 1030 02/05/18 1041   BP: (!) 148/70 (!) 158/79 (!) 132/58    Pulse: 73 89 90 89   Resp: 18 16 16    Temp: 97.8 °F (36.6 °C) 98.1 °F (36.7 °C) 97.2 °F (36.2 °C)    TempSrc: Temporal Temporal Temporal    SpO2: 100% 100% 100%    Weight:       Height:                                                  BP Readings from Last 3 Encounters:   02/05/18 (!) 132/58   09/26/17 125/67   09/20/17 (!) 140/70       NPO Status: Time of last liquid consumption: 0000                        Time of last solid consumption: 0000                        Date of last liquid consumption: 02/04/18                        Date of last solid food consumption: 02/04/18    BMI:   Wt Readings from Last 3 Encounters:   02/01/18 84 lb (38.1 kg)   09/26/17 84 lb (38.1 kg)   09/20/17 83 lb 9.6 oz (37.9 kg)     Body mass index is 15.87 kg/m².     CBC:   Lab Results   Component Value Date    WBC 6.4 02/03/2018    RBC 3.53 02/03/2018    HGB 9.9 02/05/2018    HCT 30.8 02/05/2018    HCT 34.3 01/05/2012    MCV 88.7 02/03/2018    RDW 15.2 2/5/2018

## 2018-02-05 NOTE — ANESTHESIA POSTPROCEDURE EVALUATION
Department of Anesthesiology  Postprocedure Note    Patient: Jeremie Monday  MRN: [id number]  YOB: 1933  Date of evaluation: 2/5/2018  Time:  11:20 AM     Procedure Summary     Date:  02/05/18 Room / Location:  [facility] ENDO 11 / [facility]oscopy    Anesthesia Start:  1109 Anesthesia Stop:      Procedure:  EGD DIAGNOSTIC ONLY (N/A ) Diagnosis:  (GI bleed)    Surgeon:  Albert Conrad DO Responsible Provider:  Tolu Foy CRNA    Anesthesia Type:  general ASA Status:  3          Anesthesia Type: general    Sheyla Phase I:      Sheyla Phase II:      Last vitals: Reviewed and per EMR flowsheets.        Anesthesia Post Evaluation    Patient location during evaluation: bedside  Patient participation: complete - patient participated  Level of consciousness: sleepy but conscious  Pain score: 0  Airway patency: patent  Nausea & Vomiting: no nausea and no vomiting  Complications: no  Cardiovascular status: hemodynamically stable and blood pressure returned to baseline  Respiratory status: acceptable and nasal cannula  Hydration status: stable

## 2018-02-06 ENCOUNTER — ANESTHESIA EVENT (OUTPATIENT)
Dept: ENDOSCOPY | Age: 83
DRG: 377 | End: 2018-02-06
Payer: MEDICARE

## 2018-02-06 ENCOUNTER — ANESTHESIA (OUTPATIENT)
Dept: ENDOSCOPY | Age: 83
DRG: 377 | End: 2018-02-06
Payer: MEDICARE

## 2018-02-06 VITALS
SYSTOLIC BLOOD PRESSURE: 135 MMHG | DIASTOLIC BLOOD PRESSURE: 91 MMHG | OXYGEN SATURATION: 99 % | RESPIRATION RATE: 20 BRPM

## 2018-02-06 VITALS
BODY MASS INDEX: 15.86 KG/M2 | RESPIRATION RATE: 18 BRPM | OXYGEN SATURATION: 100 % | SYSTOLIC BLOOD PRESSURE: 159 MMHG | TEMPERATURE: 98.6 F | HEIGHT: 61 IN | WEIGHT: 84 LBS | DIASTOLIC BLOOD PRESSURE: 76 MMHG | HEART RATE: 95 BPM

## 2018-02-06 PROBLEM — K92.2 GI BLEED: Status: RESOLVED | Noted: 2018-02-01 | Resolved: 2018-02-06

## 2018-02-06 LAB
HCT VFR BLD CALC: 29.1 % (ref 37–47)
HCT VFR BLD CALC: 29.6 % (ref 37–47)
HEMOGLOBIN: 9.4 G/DL (ref 12–16)
HEMOGLOBIN: 9.7 G/DL (ref 12–16)

## 2018-02-06 PROCEDURE — 85014 HEMATOCRIT: CPT

## 2018-02-06 PROCEDURE — 3700000000 HC ANESTHESIA ATTENDED CARE: Performed by: INTERNAL MEDICINE

## 2018-02-06 PROCEDURE — 3609009500 HC COLONOSCOPY DIAGNOSTIC OR SCREENING: Performed by: INTERNAL MEDICINE

## 2018-02-06 PROCEDURE — 45378 DIAGNOSTIC COLONOSCOPY: CPT | Performed by: INTERNAL MEDICINE

## 2018-02-06 PROCEDURE — 3700000001 HC ADD 15 MINUTES (ANESTHESIA): Performed by: INTERNAL MEDICINE

## 2018-02-06 PROCEDURE — 7100000000 HC PACU RECOVERY - FIRST 15 MIN: Performed by: INTERNAL MEDICINE

## 2018-02-06 PROCEDURE — 2500000003 HC RX 250 WO HCPCS: Performed by: NURSE ANESTHETIST, CERTIFIED REGISTERED

## 2018-02-06 PROCEDURE — 85018 HEMOGLOBIN: CPT

## 2018-02-06 PROCEDURE — 6360000002 HC RX W HCPCS: Performed by: NURSE ANESTHETIST, CERTIFIED REGISTERED

## 2018-02-06 PROCEDURE — 2580000003 HC RX 258: Performed by: NURSE ANESTHETIST, CERTIFIED REGISTERED

## 2018-02-06 PROCEDURE — 0DJD8ZZ INSPECTION OF LOWER INTESTINAL TRACT, VIA NATURAL OR ARTIFICIAL OPENING ENDOSCOPIC: ICD-10-PCS | Performed by: INTERNAL MEDICINE

## 2018-02-06 RX ORDER — HYDRALAZINE HYDROCHLORIDE 20 MG/ML
5 INJECTION INTRAMUSCULAR; INTRAVENOUS EVERY 10 MIN PRN
Status: DISCONTINUED | OUTPATIENT
Start: 2018-02-06 | End: 2018-02-06 | Stop reason: HOSPADM

## 2018-02-06 RX ORDER — PROPOFOL 10 MG/ML
INJECTION, EMULSION INTRAVENOUS PRN
Status: DISCONTINUED | OUTPATIENT
Start: 2018-02-06 | End: 2018-02-06 | Stop reason: SDUPTHER

## 2018-02-06 RX ORDER — DIPHENHYDRAMINE HYDROCHLORIDE 50 MG/ML
12.5 INJECTION INTRAMUSCULAR; INTRAVENOUS
Status: DISCONTINUED | OUTPATIENT
Start: 2018-02-06 | End: 2018-02-06 | Stop reason: HOSPADM

## 2018-02-06 RX ORDER — LABETALOL HYDROCHLORIDE 5 MG/ML
5 INJECTION, SOLUTION INTRAVENOUS EVERY 10 MIN PRN
Status: DISCONTINUED | OUTPATIENT
Start: 2018-02-06 | End: 2018-02-06 | Stop reason: HOSPADM

## 2018-02-06 RX ORDER — LIDOCAINE HYDROCHLORIDE 20 MG/ML
INJECTION, SOLUTION INFILTRATION; PERINEURAL PRN
Status: DISCONTINUED | OUTPATIENT
Start: 2018-02-06 | End: 2018-02-06 | Stop reason: SDUPTHER

## 2018-02-06 RX ORDER — MORPHINE SULFATE 4 MG/ML
2 INJECTION, SOLUTION INTRAMUSCULAR; INTRAVENOUS EVERY 5 MIN PRN
Status: DISCONTINUED | OUTPATIENT
Start: 2018-02-06 | End: 2018-02-06 | Stop reason: HOSPADM

## 2018-02-06 RX ORDER — MORPHINE SULFATE 4 MG/ML
4 INJECTION, SOLUTION INTRAMUSCULAR; INTRAVENOUS EVERY 5 MIN PRN
Status: DISCONTINUED | OUTPATIENT
Start: 2018-02-06 | End: 2018-02-06 | Stop reason: HOSPADM

## 2018-02-06 RX ORDER — HYDROMORPHONE HCL 110MG/55ML
0.25 PATIENT CONTROLLED ANALGESIA SYRINGE INTRAVENOUS EVERY 5 MIN PRN
Status: DISCONTINUED | OUTPATIENT
Start: 2018-02-06 | End: 2018-02-06 | Stop reason: HOSPADM

## 2018-02-06 RX ORDER — METOCLOPRAMIDE HYDROCHLORIDE 5 MG/ML
10 INJECTION INTRAMUSCULAR; INTRAVENOUS
Status: DISCONTINUED | OUTPATIENT
Start: 2018-02-06 | End: 2018-02-06 | Stop reason: HOSPADM

## 2018-02-06 RX ORDER — HYDROMORPHONE HCL 110MG/55ML
0.5 PATIENT CONTROLLED ANALGESIA SYRINGE INTRAVENOUS EVERY 5 MIN PRN
Status: DISCONTINUED | OUTPATIENT
Start: 2018-02-06 | End: 2018-02-06 | Stop reason: HOSPADM

## 2018-02-06 RX ORDER — PROMETHAZINE HYDROCHLORIDE 25 MG/ML
6.25 INJECTION, SOLUTION INTRAMUSCULAR; INTRAVENOUS
Status: DISCONTINUED | OUTPATIENT
Start: 2018-02-06 | End: 2018-02-06 | Stop reason: HOSPADM

## 2018-02-06 RX ORDER — SODIUM CHLORIDE, SODIUM LACTATE, POTASSIUM CHLORIDE, CALCIUM CHLORIDE 600; 310; 30; 20 MG/100ML; MG/100ML; MG/100ML; MG/100ML
INJECTION, SOLUTION INTRAVENOUS CONTINUOUS PRN
Status: DISCONTINUED | OUTPATIENT
Start: 2018-02-06 | End: 2018-02-06 | Stop reason: SDUPTHER

## 2018-02-06 RX ORDER — MEPERIDINE HYDROCHLORIDE 50 MG/ML
12.5 INJECTION INTRAMUSCULAR; INTRAVENOUS; SUBCUTANEOUS EVERY 5 MIN PRN
Status: DISCONTINUED | OUTPATIENT
Start: 2018-02-06 | End: 2018-02-06 | Stop reason: HOSPADM

## 2018-02-06 RX ADMIN — LIDOCAINE HYDROCHLORIDE 40 MG: 20 INJECTION, SOLUTION INFILTRATION; PERINEURAL at 10:57

## 2018-02-06 RX ADMIN — PROPOFOL 50 MG: 10 INJECTION, EMULSION INTRAVENOUS at 10:57

## 2018-02-06 RX ADMIN — SODIUM CHLORIDE, SODIUM LACTATE, POTASSIUM CHLORIDE, AND CALCIUM CHLORIDE: 600; 310; 30; 20 INJECTION, SOLUTION INTRAVENOUS at 10:54

## 2018-02-06 ASSESSMENT — PAIN SCALES - GENERAL
PAINLEVEL_OUTOF10: 0

## 2018-02-06 NOTE — ANESTHESIA POSTPROCEDURE EVALUATION
Department of Anesthesiology  Postprocedure Note    Patient: Lorraine Sylvester  MRN: [id number]  YOB: 1933  Date of evaluation: 2/6/2018  Time:  11:15 AM     Procedure Summary     Date:  02/06/18 Room / Location:  [facility] 10 / [facility] Endoscopy    Anesthesia Start:  1054 Anesthesia Stop:      Procedure:  COLONOSCOPY DIAGNOSTIC OR SCREENING (N/A ) Diagnosis:  (melena)    Surgeon:  Jazmyn Groves DO Responsible Provider:  Jordyn Sommer CRNA    Anesthesia Type:  general ASA Status:  3          Anesthesia Type: general    Sheyla Phase I: Sheyla Score: 10    Sheyla Phase II:      Last vitals: Reviewed and per EMR flowsheets.        Anesthesia Post Evaluation    Patient location during evaluation: bedside  Patient participation: complete - patient participated  Level of consciousness: sleepy but conscious  Pain score: 0  Airway patency: patent  Nausea & Vomiting: no nausea and no vomiting  Complications: no  Cardiovascular status: hemodynamically stable and blood pressure returned to baseline  Respiratory status: acceptable and nasal cannula  Hydration status: stable

## 2018-02-10 ENCOUNTER — APPOINTMENT (OUTPATIENT)
Dept: GENERAL RADIOLOGY | Age: 83
End: 2018-02-10
Payer: MEDICARE

## 2018-02-10 ENCOUNTER — HOSPITAL ENCOUNTER (EMERGENCY)
Age: 83
Discharge: HOME | End: 2018-02-10
Attending: EMERGENCY MEDICINE
Payer: MEDICARE

## 2018-02-10 ENCOUNTER — APPOINTMENT (OUTPATIENT)
Dept: CT IMAGING | Age: 83
End: 2018-02-10
Payer: MEDICARE

## 2018-02-10 VITALS
TEMPERATURE: 98.4 F | DIASTOLIC BLOOD PRESSURE: 62 MMHG | BODY MASS INDEX: 16.49 KG/M2 | RESPIRATION RATE: 14 BRPM | SYSTOLIC BLOOD PRESSURE: 156 MMHG | OXYGEN SATURATION: 94 % | HEART RATE: 70 BPM | WEIGHT: 84 LBS | HEIGHT: 60 IN

## 2018-02-10 DIAGNOSIS — E87.1 HYPONATREMIA: ICD-10-CM

## 2018-02-10 DIAGNOSIS — S09.90XA INJURY OF HEAD, INITIAL ENCOUNTER: ICD-10-CM

## 2018-02-10 DIAGNOSIS — M25.532 LEFT WRIST PAIN: ICD-10-CM
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
## 2018-02-10 DIAGNOSIS — M25.522 LEFT ELBOW PAIN: ICD-10-CM

## 2018-02-10 DIAGNOSIS — F03.90 DEMENTIA WITHOUT BEHAVIORAL DISTURBANCE, UNSPECIFIED DEMENTIA TYPE: ICD-10-CM

## 2018-02-10 DIAGNOSIS — M79.642 LEFT HAND PAIN: ICD-10-CM

## 2018-02-10 DIAGNOSIS — M25.551 RIGHT HIP PAIN: ICD-10-CM

## 2018-02-10 DIAGNOSIS — N30.00 ACUTE CYSTITIS WITHOUT HEMATURIA: Primary | ICD-10-CM

## 2018-02-10 LAB
ABO/RH: NORMAL
ALBUMIN SERPL-MCNC: 3.1 G/DL (ref 3.5–5.2)
ALP BLD-CCNC: 98 U/L (ref 35–104)
ALT SERPL-CCNC: 18 U/L (ref 5–33)
ANION GAP SERPL CALCULATED.3IONS-SCNC: 12 MMOL/L (ref 7–19)
ANTIBODY SCREEN: NORMAL
APTT: 26.4 SEC (ref 26–36.2)
AST SERPL-CCNC: 32 U/L (ref 5–32)
BACTERIA: ABNORMAL /HPF
BASOPHILS ABSOLUTE: 0 K/UL (ref 0–0.2)
BASOPHILS RELATIVE PERCENT: 0.4 % (ref 0–1)
BILIRUB SERPL-MCNC: 0.7 MG/DL (ref 0.2–1.2)
BILIRUBIN URINE: NEGATIVE
BLOOD, URINE: ABNORMAL
BUN BLDV-MCNC: 18 MG/DL (ref 8–23)
CALCIUM SERPL-MCNC: 8.6 MG/DL (ref 8.8–10.2)
CHLORIDE BLD-SCNC: 91 MMOL/L (ref 98–111)
CLARITY: ABNORMAL
CO2: 24 MMOL/L (ref 22–29)
COLOR: YELLOW
CREAT SERPL-MCNC: 0.7 MG/DL (ref 0.5–0.9)
EOSINOPHILS ABSOLUTE: 0.1 K/UL (ref 0–0.6)
EOSINOPHILS RELATIVE PERCENT: 0.9 % (ref 0–5)
EPITHELIAL CELLS, UA: 1 /HPF (ref 0–5)
GFR NON-AFRICAN AMERICAN: >60
GLUCOSE BLD-MCNC: 90 MG/DL (ref 74–109)
GLUCOSE URINE: NEGATIVE MG/DL
HCT VFR BLD CALC: 28.6 % (ref 37–47)
HEMOGLOBIN: 9.2 G/DL (ref 12–16)
HYALINE CASTS: 1 /HPF (ref 0–8)
INR BLD: 1.14 (ref 0.88–1.18)
KETONES, URINE: ABNORMAL MG/DL
LEUKOCYTE ESTERASE, URINE: ABNORMAL
LYMPHOCYTES ABSOLUTE: 1.9 K/UL (ref 1.1–4.5)
LYMPHOCYTES RELATIVE PERCENT: 24.7 % (ref 20–40)
MCH RBC QN AUTO: 28 PG (ref 27–31)
MCHC RBC AUTO-ENTMCNC: 32.2 G/DL (ref 33–37)
MCV RBC AUTO: 87.2 FL (ref 81–99)
MONOCYTES ABSOLUTE: 0.8 K/UL (ref 0–0.9)
MONOCYTES RELATIVE PERCENT: 10.6 % (ref 0–10)
NEUTROPHILS ABSOLUTE: 4.9 K/UL (ref 1.5–7.5)
NEUTROPHILS RELATIVE PERCENT: 63.1 % (ref 50–65)
NITRITE, URINE: NEGATIVE
PDW BLD-RTO: 16.5 % (ref 11.5–14.5)
PH UA: 6.5
PLATELET # BLD: 221 K/UL (ref 130–400)
PMV BLD AUTO: 10.5 FL (ref 9.4–12.3)
POTASSIUM SERPL-SCNC: 3.7 MMOL/L (ref 3.5–5)
PROTEIN UA: NEGATIVE MG/DL
PROTHROMBIN TIME: 14.5 SEC (ref 12–14.6)
RBC # BLD: 3.28 M/UL (ref 4.2–5.4)
RBC UA: 5 /HPF (ref 0–4)
SODIUM BLD-SCNC: 127 MMOL/L (ref 136–145)
SPECIFIC GRAVITY UA: 1.01
TOTAL PROTEIN: 5.6 G/DL (ref 6.6–8.7)
TROPONIN: <0.01 NG/ML (ref 0–0.03)
UROBILINOGEN, URINE: 0.2 E.U./DL
WBC # BLD: 7.8 K/UL (ref 4.8–10.8)
WBC UA: 428 /HPF (ref 0–5)

## 2018-02-10 PROCEDURE — 99285 EMERGENCY DEPT VISIT HI MDM: CPT | Performed by: EMERGENCY MEDICINE

## 2018-02-10 PROCEDURE — 36415 COLL VENOUS BLD VENIPUNCTURE: CPT

## 2018-02-10 PROCEDURE — 85730 THROMBOPLASTIN TIME PARTIAL: CPT

## 2018-02-10 PROCEDURE — 80053 COMPREHEN METABOLIC PANEL: CPT

## 2018-02-10 PROCEDURE — 6370000000 HC RX 637 (ALT 250 FOR IP): Performed by: EMERGENCY MEDICINE

## 2018-02-10 PROCEDURE — 84484 ASSAY OF TROPONIN QUANT: CPT

## 2018-02-10 PROCEDURE — 85025 COMPLETE CBC W/AUTO DIFF WBC: CPT

## 2018-02-10 PROCEDURE — 93005 ELECTROCARDIOGRAM TRACING: CPT

## 2018-02-10 PROCEDURE — 73130 X-RAY EXAM OF HAND: CPT

## 2018-02-10 PROCEDURE — 70450 CT HEAD/BRAIN W/O DYE: CPT

## 2018-02-10 PROCEDURE — 81001 URINALYSIS AUTO W/SCOPE: CPT

## 2018-02-10 PROCEDURE — 73080 X-RAY EXAM OF ELBOW: CPT

## 2018-02-10 PROCEDURE — 71045 X-RAY EXAM CHEST 1 VIEW: CPT

## 2018-02-10 PROCEDURE — 99285 EMERGENCY DEPT VISIT HI MDM: CPT

## 2018-02-10 PROCEDURE — 73110 X-RAY EXAM OF WRIST: CPT

## 2018-02-10 PROCEDURE — 86900 BLOOD TYPING SEROLOGIC ABO: CPT

## 2018-02-10 PROCEDURE — 86901 BLOOD TYPING SEROLOGIC RH(D): CPT

## 2018-02-10 PROCEDURE — 96360 HYDRATION IV INFUSION INIT: CPT

## 2018-02-10 PROCEDURE — 73502 X-RAY EXAM HIP UNI 2-3 VIEWS: CPT

## 2018-02-10 PROCEDURE — 73552 X-RAY EXAM OF FEMUR 2/>: CPT

## 2018-02-10 PROCEDURE — 2580000003 HC RX 258: Performed by: EMERGENCY MEDICINE

## 2018-02-10 PROCEDURE — 86850 RBC ANTIBODY SCREEN: CPT

## 2018-02-10 PROCEDURE — 85610 PROTHROMBIN TIME: CPT

## 2018-02-10 RX ORDER — CEPHALEXIN 500 MG/1
500 CAPSULE ORAL 4 TIMES DAILY
Qty: 28 CAPSULE | Refills: 0 | Status: SHIPPED | OUTPATIENT
Start: 2018-02-10 | End: 2018-02-10 | Stop reason: ALTCHOICE

## 2018-02-10 RX ORDER — 0.9 % SODIUM CHLORIDE 0.9 %
500 INTRAVENOUS SOLUTION INTRAVENOUS ONCE
Status: COMPLETED | OUTPATIENT
Start: 2018-02-10 | End: 2018-02-10

## 2018-02-10 RX ORDER — SULFAMETHOXAZOLE AND TRIMETHOPRIM 800; 160 MG/1; MG/1
1 TABLET ORAL 2 TIMES DAILY
Qty: 14 TABLET | Refills: 0 | Status: SHIPPED | OUTPATIENT
Start: 2018-02-10 | End: 2018-02-17

## 2018-02-10 RX ORDER — SULFAMETHOXAZOLE AND TRIMETHOPRIM 800; 160 MG/1; MG/1
1 TABLET ORAL ONCE
Status: COMPLETED | OUTPATIENT
Start: 2018-02-10 | End: 2018-02-10

## 2018-02-10 RX ADMIN — SODIUM CHLORIDE 500 ML: 9 INJECTION, SOLUTION INTRAVENOUS at 14:53

## 2018-02-10 RX ADMIN — SULFAMETHOXAZOLE AND TRIMETHOPRIM 1 TABLET: 800; 160 TABLET ORAL at 15:51

## 2018-02-10 NOTE — ED PROVIDER NOTES
[facility] EMERG[facility]Y DEPT  eMERGENCY dEPARTMENT eNCOUnter      Pt Name: Park Adames  MRN: [id number]  [place] 10/19/1933  Date of evaluation: 2/10/2018  Provider: Sadiq Esquivel MD    [address] [place] [address]       Chief Complaint   Patient presents with   Re Arellano    Extremity Weakness    Altered Mental Status         HISTORY OF PRESENT ILLNESS   (Location/Symptom, Timing/Onset, Context/Setting, Quality, Duration, Modifying Factors, Severity)  Note limiting factors. Park Adames is a 80 y.o. female who presents to the emergency department For concern of increased confusion, mechanical fall yesterday and generalized weakness. The patient was recently hospitalized and discharged this past Tuesday after being found anemic and suspected GI bleed. She saw Dr. Christain Apgar is past Thursday and had a repeat CBC and were told yesterday that she needed to come back today to have this rechecked. She had a mechanical fall yesterday and struck the front of her head and also complains of some left upper extremity pain and right buttock region pain. she has since stopped her eliquis per Dr. Christain Apgar during last hospitalization. Per the patient's daughter on endoscopy and colonoscopy did not discover an obvious source of the GI bleed. Patient does have some dementia but her daughter is here to provide the majority of the history. No recent lightheadedness syncope or focal weakness or melena. HPI    Nursing Notes were reviewed.     REVIEW OF SYSTEMS    (2-9 systems for level 4, 10 or more for level 5)     Review of Systems   Unable to perform ROS: Dementia            PAST MEDICAL HISTORY     Past Medical History:   Diagnosis Date    Anxiety     GERD (gastroesophageal reflux disease)     Macular degeneration     Palliative care encounter     Pneumonia     Weakness          SURGICAL HISTORY       Past Surgical History:   Procedure Laterality Date    CATARACT REMOVAL WITH IMPLANT  10/2015    right eye    CHOLECYSTECTOMY      COLON SURGERY Temp src Pulse Resp SpO2 Height Weight   125/71 98.4 °F (36.9 °C) -- 90 16 98 % 5' (1.524 m) 84 lb (38.1 kg)       Physical Exam   Constitutional: Vital signs are normal. She appears cachectic. No distress. HENT:   Head: Normocephalic. Right Ear: External ear normal.   Left Ear: External ear normal.   Mouth/Throat: Oropharynx is clear and moist.   Bifrontal ecchymosis purple in color   Eyes: Conjunctivae and EOM are normal. Pupils are equal, round, and reactive to light. Neck: Normal range of motion and full passive range of motion without pain. No spinous process tenderness and no muscular tenderness present. No tracheal deviation present. Cardiovascular: Normal rate, regular rhythm, normal heart sounds and intact distal pulses. No murmur heard. Pulmonary/Chest: Effort normal and breath sounds normal. She has no wheezes. She has no rales. Abdominal: Soft. There is no tenderness. Musculoskeletal: Normal range of motion. She exhibits no edema. Ecchymosis noted to the left wrist and hand region however no focal tenderness, complains of some left elbow pain but no deformity and full range of motion without pain, right gluteus tender to palpation no obvious deformity and seems to have normal range of motion without pain in her hip bilaterally    No scaphoid tenderness   Neurological: She is alert. She has normal strength. No sensory deficit. GCS eye subscore is 4. GCS verbal subscore is 4. GCS motor subscore is 6. Skin: Skin is warm and dry. Vitals reviewed.       DIAGNOSTIC RESULTS     EKG: All EKG's are interpreted by the Emergency Department Physician who either signs or Co-signs this chart in the absence of a cardiologist.    85, normal sinus rhythm, left anterior fascicular block, nonspecific EKG    RADIOLOGY:   Non-plain film images such as CT, Ultrasound and MRI are read by the radiologist. Plain radiographic images are visualized and preliminarily interpreted by the emergency physician with the below findings:          XR FEMUR RIGHT (MIN 2 VIEWS)   Final Result   1. No acute osseous injury to the right femur. Right hip arthroplasty   is intact. Signed by Dr Magdy James on 2/10/2018 2:55 PM      XR HIP 2-3 VW W PELVIS RIGHT   Final Result   Impression:    1. No acute osseous injury in the pelvis/hips. Signed by Dr Magdy James on 2/10/2018 2:50 PM      XR ELBOW LEFT (MIN 3 VIEWS)   Final Result   Impression:    1. No acute osseous injury or malalignment at the elbow. Signed by Dr Magdy James on 2/10/2018 2:49 PM      XR WRIST LEFT (MIN 3 VIEWS)   Final Result   1. No acute fracture identified. 2. 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 Signed by Dr Magdy James on 2/10/2018 2:48 PM      XR HAND LEFT (MIN 3 VIEWS)   Final Result   1. No acute fracture identified. 2. 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 Signed by Dr Magdy James on 2/10/2018 2:48 PM      XR CHEST PORTABLE   Final Result   1. No radiographic evidence of acute cardiopulmonary process. 2. No displaced fractures are identified. 3. 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 Signed by Dr Magdy James on 2/10/2018 2:41 PM      CT Head WO Contrast   Final Result   1. No acute intracranial process.  Overall stable exam.   Signed by Dr Magdy James on 2/10/2018 2:30 PM              LABS:  Labs Reviewed   CBC WITH AUTO DIFFERENTIAL - Abnormal; Notable for the following:        Result Value    RBC 3.28 (*)     Hemoglobin 9.2 (*)     Hematocrit 28.6 (*)     MCHC 32.2 (*)     RDW 16.5 (*)     Monocytes % 10.6 (*)     All other components within normal limits   COMPREHENSIVE METABOLIC PANEL - Abnormal; Notable for the following:     Sodium 127 (*)     Chloride 91 (*)     Calcium 8.6 (*)     Total Protein 5.6 (*)     Alb 3.1 (*)     All other components within normal limits   URINALYSIS - Abnormal; Notable for the bactrim, first dose here, stressed needs cbc and cmp rechecked early this next week, return precautions discussed, agreeable with plan      CONSULTS:  None    PROCEDURES:  Unless otherwise noted below, none     Procedures    FINAL IMPRESSION      1. Acute cystitis without hematuria    2. Dementia without behavioral disturbance, unspecified dementia type    3. Hyponatremia    4. Injury of head, initial encounter    5. Fall, initial encounter    6. Left wrist pain    7. Left hand pain    8. Left elbow pain    9.  Right hip pain          DISPOSITION/PLAN   DISPOSITION        PATIENT REFERRED TO:  Rosanna Magaña MD  [facility]  [phone]    Schedule an appointment as soon as possible for a visit in 2 days        DISCHARGE MEDICATIONS:  New Prescriptions    SULFAMETHOXAZOLE-TRIMETHOPRIM (BACTRIM DS) 800-160 MG PER TABLET    Take 1 tablet by mouth 2 times daily for 7 days          (Please note that portions of this note were completed with a voice recognition program.  Efforts were made to edit the dictations but occasionally words are mis-transcribed.)    Javid Robles MD (electronically signed)  Attending Emergency Physician        Rosaura Arzate MD  02/10/18 8825

## 2018-02-15 LAB
EKG P AXIS: 76 DEGREES
EKG P-R INTERVAL: 192 MS
EKG Q-T INTERVAL: 390 MS
EKG QRS DURATION: 104 MS
EKG QTC CALCULATION (BAZETT): 432 MS
EKG T AXIS: 76 DEGREES

## 2018-05-02 ENCOUNTER — HOSPITAL ENCOUNTER (INPATIENT)
Age: 83
LOS: 1 days | Discharge: HOME | DRG: 313 | End: 2018-05-03
Attending: EMERGENCY MEDICINE | Admitting: FAMILY MEDICINE
Payer: MEDICARE

## 2018-05-02 ENCOUNTER — APPOINTMENT (OUTPATIENT)
Dept: GENERAL RADIOLOGY | Age: 83
DRG: 313 | End: 2018-05-02
Payer: MEDICARE

## 2018-05-02 DIAGNOSIS — R07.89 CHEST DISCOMFORT: Primary | ICD-10-CM

## 2018-05-02 LAB
ALBUMIN SERPL-MCNC: 3.8 G/DL (ref 3.5–5.2)
ALP BLD-CCNC: 96 U/L (ref 35–104)
ALT SERPL-CCNC: 14 U/L (ref 5–33)
ANION GAP SERPL CALCULATED.3IONS-SCNC: 10 MMOL/L (ref 7–19)
AST SERPL-CCNC: 27 U/L (ref 5–32)
BASOPHILS ABSOLUTE: 0 K/UL (ref 0–0.2)
BASOPHILS RELATIVE PERCENT: 0.8 % (ref 0–1)
BILIRUB SERPL-MCNC: <0.2 MG/DL (ref 0.2–1.2)
BUN BLDV-MCNC: 23 MG/DL (ref 8–23)
CALCIUM SERPL-MCNC: 8.8 MG/DL (ref 8.8–10.2)
CHLORIDE BLD-SCNC: 94 MMOL/L (ref 98–111)
CO2: 25 MMOL/L (ref 22–29)
CREAT SERPL-MCNC: 1.1 MG/DL (ref 0.5–0.9)
EOSINOPHILS ABSOLUTE: 0.2 K/UL (ref 0–0.6)
EOSINOPHILS RELATIVE PERCENT: 3 % (ref 0–5)
GFR NON-AFRICAN AMERICAN: 47
GLUCOSE BLD-MCNC: 97 MG/DL (ref 74–109)
HCT VFR BLD CALC: 27.9 % (ref 37–47)
HEMOGLOBIN: 8.6 G/DL (ref 12–16)
LYMPHOCYTES ABSOLUTE: 1.9 K/UL (ref 1.1–4.5)
LYMPHOCYTES RELATIVE PERCENT: 37.1 % (ref 20–40)
MCH RBC QN AUTO: 26.6 PG (ref 27–31)
MCHC RBC AUTO-ENTMCNC: 30.8 G/DL (ref 33–37)
MCV RBC AUTO: 86.4 FL (ref 81–99)
MONOCYTES ABSOLUTE: 0.7 K/UL (ref 0–0.9)
MONOCYTES RELATIVE PERCENT: 12.9 % (ref 0–10)
NEUTROPHILS ABSOLUTE: 2.3 K/UL (ref 1.5–7.5)
NEUTROPHILS RELATIVE PERCENT: 46 % (ref 50–65)
PDW BLD-RTO: 15.9 % (ref 11.5–14.5)
PERFORMED ON: NORMAL
PERFORMED ON: NORMAL
PLATELET # BLD: 231 K/UL (ref 130–400)
PMV BLD AUTO: 10.4 FL (ref 9.4–12.3)
POC TROPONIN I: 0.01 NG/ML (ref 0–0.08)
POC TROPONIN I: 0.01 NG/ML (ref 0–0.08)
POTASSIUM SERPL-SCNC: 5 MMOL/L (ref 3.5–5)
RBC # BLD: 3.23 M/UL (ref 4.2–5.4)
SODIUM BLD-SCNC: 129 MMOL/L (ref 136–145)
TOTAL PROTEIN: 6.6 G/DL (ref 6.6–8.7)
WBC # BLD: 5 K/UL (ref 4.8–10.8)

## 2018-05-02 PROCEDURE — 80053 COMPREHEN METABOLIC PANEL: CPT

## 2018-05-02 PROCEDURE — 36415 COLL VENOUS BLD VENIPUNCTURE: CPT

## 2018-05-02 PROCEDURE — 6370000000 HC RX 637 (ALT 250 FOR IP): Performed by: EMERGENCY MEDICINE

## 2018-05-02 PROCEDURE — 99223 1ST HOSP IP/OBS HIGH 75: CPT | Performed by: INTERNAL MEDICINE

## 2018-05-02 PROCEDURE — 2580000003 HC RX 258: Performed by: FAMILY MEDICINE

## 2018-05-02 PROCEDURE — 71045 X-RAY EXAM CHEST 1 VIEW: CPT

## 2018-05-02 PROCEDURE — 85025 COMPLETE CBC W/AUTO DIFF WBC: CPT

## 2018-05-02 PROCEDURE — 93005 ELECTROCARDIOGRAM TRACING: CPT

## 2018-05-02 PROCEDURE — 2140000000 HC CCU INTERMEDIATE R&B

## 2018-05-02 PROCEDURE — 84484 ASSAY OF TROPONIN QUANT: CPT

## 2018-05-02 PROCEDURE — 99285 EMERGENCY DEPT VISIT HI MDM: CPT

## 2018-05-02 RX ORDER — NITROGLYCERIN 0.4 MG/1
0.4 TABLET SUBLINGUAL EVERY 5 MIN PRN
Status: DISCONTINUED | OUTPATIENT
Start: 2018-05-02 | End: 2018-05-03 | Stop reason: HOSPADM

## 2018-05-02 RX ORDER — ONDANSETRON 2 MG/ML
4 INJECTION INTRAMUSCULAR; INTRAVENOUS EVERY 8 HOURS PRN
Status: DISCONTINUED | OUTPATIENT
Start: 2018-05-02 | End: 2018-05-03 | Stop reason: HOSPADM

## 2018-05-02 RX ORDER — ACETAMINOPHEN 325 MG/1
650 TABLET ORAL EVERY 4 HOURS PRN
Status: DISCONTINUED | OUTPATIENT
Start: 2018-05-02 | End: 2018-05-03 | Stop reason: HOSPADM

## 2018-05-02 RX ORDER — ASPIRIN 81 MG/1
324 TABLET, CHEWABLE ORAL ONCE
Status: COMPLETED | OUTPATIENT
Start: 2018-05-02 | End: 2018-05-02

## 2018-05-02 RX ORDER — SODIUM CHLORIDE 9 MG/ML
INJECTION, SOLUTION INTRAVENOUS CONTINUOUS
Status: DISCONTINUED | OUTPATIENT
Start: 2018-05-02 | End: 2018-05-03 | Stop reason: HOSPADM

## 2018-05-02 RX ADMIN — ASPIRIN 81 MG CHEWABLE TABLET 324 MG: 81 TABLET CHEWABLE at 17:49

## 2018-05-02 RX ADMIN — SODIUM CHLORIDE: 9 INJECTION, SOLUTION INTRAVENOUS at 22:39

## 2018-05-02 ASSESSMENT — ENCOUNTER SYMPTOMS
BACK PAIN: 0
VOMITING: 0
SHORTNESS OF BREATH: 1
NAUSEA: 0

## 2018-05-03 VITALS
DIASTOLIC BLOOD PRESSURE: 76 MMHG | SYSTOLIC BLOOD PRESSURE: 177 MMHG | BODY MASS INDEX: 16.05 KG/M2 | HEART RATE: 86 BPM | TEMPERATURE: 97.2 F | RESPIRATION RATE: 16 BRPM | OXYGEN SATURATION: 99 % | WEIGHT: 85 LBS | HEIGHT: 61 IN

## 2018-05-03 LAB
EKG P AXIS: 77 DEGREES
EKG P AXIS: 77 DEGREES
EKG P-R INTERVAL: 218 MS
EKG P-R INTERVAL: 232 MS
EKG Q-T INTERVAL: 388 MS
EKG Q-T INTERVAL: 396 MS
EKG QRS DURATION: 104 MS
EKG QRS DURATION: 106 MS
EKG QTC CALCULATION (BAZETT): 424 MS
EKG QTC CALCULATION (BAZETT): 428 MS
EKG T AXIS: 56 DEGREES
EKG T AXIS: 74 DEGREES

## 2018-05-03 PROCEDURE — 99285 EMERGENCY DEPT VISIT HI MDM: CPT | Performed by: EMERGENCY MEDICINE

## 2018-05-03 PROCEDURE — 99232 SBSQ HOSP IP/OBS MODERATE 35: CPT | Performed by: INTERNAL MEDICINE

## 2018-05-03 RX ORDER — LEVOTHYROXINE SODIUM 0.1 MG/1
100 TABLET ORAL DAILY
Status: DISCONTINUED | OUTPATIENT
Start: 2018-05-03 | End: 2018-05-03 | Stop reason: HOSPADM

## 2018-05-03 RX ORDER — TOPIRAMATE 25 MG/1
25 TABLET ORAL DAILY
Status: DISCONTINUED | OUTPATIENT
Start: 2018-05-03 | End: 2018-05-03 | Stop reason: HOSPADM

## 2018-05-03 RX ORDER — METOPROLOL SUCCINATE 25 MG/1
25 TABLET, EXTENDED RELEASE ORAL DAILY
Status: DISCONTINUED | OUTPATIENT
Start: 2018-05-03 | End: 2018-05-03 | Stop reason: HOSPADM

## 2018-05-03 RX ORDER — NITROGLYCERIN 0.4 MG/1
TABLET SUBLINGUAL
Qty: 25 TABLET | Refills: 3 | Status: SHIPPED | OUTPATIENT
Start: 2018-05-03

## 2018-05-03 RX ORDER — ATORVASTATIN CALCIUM 20 MG/1
20 TABLET, FILM COATED ORAL NIGHTLY
Status: DISCONTINUED | OUTPATIENT
Start: 2018-05-03 | End: 2018-05-03 | Stop reason: HOSPADM

## 2018-05-03 RX ORDER — MIDODRINE HYDROCHLORIDE 5 MG/1
5 TABLET ORAL 2 TIMES DAILY
Status: DISCONTINUED | OUTPATIENT
Start: 2018-05-03 | End: 2018-05-03 | Stop reason: HOSPADM

## 2018-05-03 RX ORDER — PANTOPRAZOLE SODIUM 40 MG/1
40 TABLET, DELAYED RELEASE ORAL
Status: DISCONTINUED | OUTPATIENT
Start: 2018-05-04 | End: 2018-05-03 | Stop reason: HOSPADM

## 2018-05-03 RX ORDER — FERROUS SULFATE 325(65) MG
325 TABLET ORAL DAILY
Status: DISCONTINUED | OUTPATIENT
Start: 2018-05-03 | End: 2018-05-03 | Stop reason: HOSPADM

## 2018-05-03 RX ORDER — TROSPIUM CHLORIDE 20 MG/1
20 TABLET, FILM COATED ORAL NIGHTLY
Status: DISCONTINUED | OUTPATIENT
Start: 2018-05-03 | End: 2018-05-03 | Stop reason: HOSPADM

## 2018-05-03 RX ORDER — 0.9 % SODIUM CHLORIDE 0.9 %
250 INTRAVENOUS SOLUTION INTRAVENOUS ONCE
Status: DISCONTINUED | OUTPATIENT
Start: 2018-05-03 | End: 2018-05-03

## 2018-05-03 RX ORDER — FUROSEMIDE 10 MG/ML
40 INJECTION INTRAMUSCULAR; INTRAVENOUS PRN
Status: DISCONTINUED | OUTPATIENT
Start: 2018-05-03 | End: 2018-05-03

## 2018-05-03 ASSESSMENT — ENCOUNTER SYMPTOMS
BACK PAIN: 0
NAUSEA: 0
SHORTNESS OF BREATH: 1
VOMITING: 0

## 2018-05-03 ASSESSMENT — PAIN SCALES - GENERAL
PAINLEVEL_OUTOF10: 0
PAINLEVEL_OUTOF10: 0

## 2018-05-30 ENCOUNTER — HOSPITAL ENCOUNTER (OUTPATIENT)
Dept: NUCLEAR MEDICINE | Age: 83
Discharge: HOME | End: 2018-06-01
Payer: MEDICARE

## 2018-05-30 ENCOUNTER — HOSPITAL ENCOUNTER (OUTPATIENT)
Dept: NON INVASIVE DIAGNOSTICS | Age: 83
Discharge: HOME | End: 2018-05-30
Payer: MEDICARE

## 2018-05-30 DIAGNOSIS — R07.9 CHEST PAIN, UNSPECIFIED TYPE: ICD-10-CM

## 2018-05-30 PROCEDURE — 93017 CV STRESS TEST TRACING ONLY: CPT

## 2018-05-30 PROCEDURE — 78452 HT MUSCLE IMAGE SPECT MULT: CPT

## 2018-05-30 PROCEDURE — 6360000002 HC RX W HCPCS: Performed by: PHYSICIAN ASSISTANT

## 2018-05-30 PROCEDURE — 3430000000 HC RX DIAGNOSTIC RADIOPHARMACEUTICAL: Performed by: PHYSICIAN ASSISTANT

## 2018-05-30 PROCEDURE — A9500 TC99M SESTAMIBI: HCPCS | Performed by: PHYSICIAN ASSISTANT

## 2018-05-30 RX ADMIN — TETRAKIS(2-METHOXYISOBUTYLISOCYANIDE)COPPER(I) TETRAFLUOROBORATE 30 MILLICURIE: 1 INJECTION, POWDER, LYOPHILIZED, FOR SOLUTION INTRAVENOUS at 14:38

## 2018-05-30 RX ADMIN — REGADENOSON 0.4 MG: 0.08 INJECTION, SOLUTION INTRAVENOUS at 14:39

## 2018-05-30 RX ADMIN — TETRAKIS(2-METHOXYISOBUTYLISOCYANIDE)COPPER(I) TETRAFLUOROBORATE 10 MILLICURIE: 1 INJECTION, POWDER, LYOPHILIZED, FOR SOLUTION INTRAVENOUS at 14:39

## 2018-05-31 LAB
LV EF: 48 %
LVEF MODALITY: NORMAL

## 2018-07-12 ENCOUNTER — HOSPITAL ENCOUNTER (EMERGENCY)
Age: 83
Discharge: HOME | DRG: 643 | End: 2018-07-12
Attending: EMERGENCY MEDICINE
Payer: MEDICARE

## 2018-07-12 ENCOUNTER — APPOINTMENT (OUTPATIENT)
Dept: GENERAL RADIOLOGY | Age: 83
DRG: 643 | End: 2018-07-12
Payer: MEDICARE

## 2018-07-12 ENCOUNTER — APPOINTMENT (OUTPATIENT)
Dept: CT IMAGING | Age: 83
DRG: 643 | End: 2018-07-12
Payer: MEDICARE

## 2018-07-12 VITALS
BODY MASS INDEX: 16.9 KG/M2 | DIASTOLIC BLOOD PRESSURE: 59 MMHG | HEART RATE: 86 BPM | RESPIRATION RATE: 20 BRPM | TEMPERATURE: 97.6 F | SYSTOLIC BLOOD PRESSURE: 126 MMHG | WEIGHT: 99 LBS | HEIGHT: 64 IN | OXYGEN SATURATION: 98 %

## 2018-07-12 DIAGNOSIS — S09.90XA CLOSED HEAD INJURY, INITIAL ENCOUNTER: ICD-10-CM
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
## 2018-07-12 DIAGNOSIS — R55 SYNCOPE AND COLLAPSE: Primary | ICD-10-CM

## 2018-07-12 DIAGNOSIS — N39.0 URINARY TRACT INFECTION WITHOUT HEMATURIA, SITE UNSPECIFIED: ICD-10-CM

## 2018-07-12 LAB
ANION GAP SERPL CALCULATED.3IONS-SCNC: 11 MMOL/L (ref 7–19)
APTT: 24.8 SEC (ref 26–36.2)
BACTERIA: ABNORMAL /HPF
BASOPHILS ABSOLUTE: 0 K/UL (ref 0–0.2)
BASOPHILS RELATIVE PERCENT: 0.6 % (ref 0–1)
BILIRUBIN URINE: NEGATIVE
BLOOD, URINE: NEGATIVE
BUN BLDV-MCNC: 22 MG/DL (ref 8–23)
CALCIUM SERPL-MCNC: 9.1 MG/DL (ref 8.8–10.2)
CHLORIDE BLD-SCNC: 94 MMOL/L (ref 98–111)
CLARITY: ABNORMAL
CO2: 25 MMOL/L (ref 22–29)
COLOR: YELLOW
CREAT SERPL-MCNC: 1 MG/DL (ref 0.5–0.9)
EOSINOPHILS ABSOLUTE: 0.1 K/UL (ref 0–0.6)
EOSINOPHILS RELATIVE PERCENT: 2.7 % (ref 0–5)
EPITHELIAL CELLS, UA: 0 /HPF (ref 0–5)
GFR NON-AFRICAN AMERICAN: 53
GLUCOSE BLD-MCNC: 109 MG/DL (ref 74–109)
GLUCOSE URINE: NEGATIVE MG/DL
HCT VFR BLD CALC: 32 % (ref 37–47)
HEMOGLOBIN: 10.5 G/DL (ref 12–16)
HYALINE CASTS: 1 /HPF (ref 0–8)
INR BLD: 1.02 (ref 0.88–1.18)
KETONES, URINE: NEGATIVE MG/DL
LEUKOCYTE ESTERASE, URINE: ABNORMAL
LYMPHOCYTES ABSOLUTE: 2.2 K/UL (ref 1.1–4.5)
LYMPHOCYTES RELATIVE PERCENT: 42.9 % (ref 20–40)
MCH RBC QN AUTO: 28.9 PG (ref 27–31)
MCHC RBC AUTO-ENTMCNC: 32.8 G/DL (ref 33–37)
MCV RBC AUTO: 88.2 FL (ref 81–99)
MONOCYTES ABSOLUTE: 0.7 K/UL (ref 0–0.9)
MONOCYTES RELATIVE PERCENT: 13.5 % (ref 0–10)
NEUTROPHILS ABSOLUTE: 2 K/UL (ref 1.5–7.5)
NEUTROPHILS RELATIVE PERCENT: 40.1 % (ref 50–65)
NITRITE, URINE: NEGATIVE
PDW BLD-RTO: 18.6 % (ref 11.5–14.5)
PH UA: 7.5
PLATELET # BLD: 165 K/UL (ref 130–400)
PMV BLD AUTO: 10.8 FL (ref 9.4–12.3)
POTASSIUM SERPL-SCNC: 4.4 MMOL/L (ref 3.5–5)
PROTEIN UA: NEGATIVE MG/DL
PROTHROMBIN TIME: 13.3 SEC (ref 12–14.6)
RBC # BLD: 3.63 M/UL (ref 4.2–5.4)
RBC UA: 1 /HPF (ref 0–4)
SODIUM BLD-SCNC: 130 MMOL/L (ref 136–145)
SPECIFIC GRAVITY UA: 1.01
TROPONIN: <0.01 NG/ML (ref 0–0.03)
TSH SERPL DL<=0.05 MIU/L-ACNC: 0.04 UIU/ML (ref 0.27–4.2)
URINE REFLEX TO CULTURE: YES
UROBILINOGEN, URINE: 0.2 E.U./DL
WBC # BLD: 5.1 K/UL (ref 4.8–10.8)
WBC UA: 34 /HPF (ref 0–5)

## 2018-07-12 PROCEDURE — 84443 ASSAY THYROID STIM HORMONE: CPT

## 2018-07-12 PROCEDURE — 87186 SC STD MICRODIL/AGAR DIL: CPT

## 2018-07-12 PROCEDURE — 70450 CT HEAD/BRAIN W/O DYE: CPT

## 2018-07-12 PROCEDURE — 87077 CULTURE AEROBIC IDENTIFY: CPT

## 2018-07-12 PROCEDURE — 84484 ASSAY OF TROPONIN QUANT: CPT

## 2018-07-12 PROCEDURE — 99284 EMERGENCY DEPT VISIT MOD MDM: CPT | Performed by: EMERGENCY MEDICINE

## 2018-07-12 PROCEDURE — 80048 BASIC METABOLIC PNL TOTAL CA: CPT

## 2018-07-12 PROCEDURE — 2580000003 HC RX 258: Performed by: EMERGENCY MEDICINE

## 2018-07-12 PROCEDURE — 85730 THROMBOPLASTIN TIME PARTIAL: CPT

## 2018-07-12 PROCEDURE — 36415 COLL VENOUS BLD VENIPUNCTURE: CPT

## 2018-07-12 PROCEDURE — 93005 ELECTROCARDIOGRAM TRACING: CPT

## 2018-07-12 PROCEDURE — 85610 PROTHROMBIN TIME: CPT

## 2018-07-12 PROCEDURE — 81001 URINALYSIS AUTO W/SCOPE: CPT

## 2018-07-12 PROCEDURE — 99285 EMERGENCY DEPT VISIT HI MDM: CPT

## 2018-07-12 PROCEDURE — 6370000000 HC RX 637 (ALT 250 FOR IP): Performed by: EMERGENCY MEDICINE

## 2018-07-12 PROCEDURE — 72170 X-RAY EXAM OF PELVIS: CPT

## 2018-07-12 PROCEDURE — 87086 URINE CULTURE/COLONY COUNT: CPT

## 2018-07-12 PROCEDURE — 71045 X-RAY EXAM CHEST 1 VIEW: CPT

## 2018-07-12 PROCEDURE — 85025 COMPLETE CBC W/AUTO DIFF WBC: CPT

## 2018-07-12 RX ORDER — LEVOFLOXACIN 500 MG/1
750 TABLET, FILM COATED ORAL ONCE
Status: COMPLETED | OUTPATIENT
Start: 2018-07-12 | End: 2018-07-12

## 2018-07-12 RX ORDER — LEVOFLOXACIN 500 MG/1
500 TABLET, FILM COATED ORAL DAILY
Qty: 6 TABLET | Refills: 0 | Status: ON HOLD | OUTPATIENT
Start: 2018-07-12 | End: 2018-07-18

## 2018-07-12 RX ORDER — ACETAMINOPHEN 500 MG
1000 TABLET ORAL ONCE
Status: COMPLETED | OUTPATIENT
Start: 2018-07-12 | End: 2018-07-12

## 2018-07-12 RX ORDER — 0.9 % SODIUM CHLORIDE 0.9 %
1000 INTRAVENOUS SOLUTION INTRAVENOUS ONCE
Status: COMPLETED | OUTPATIENT
Start: 2018-07-12 | End: 2018-07-12

## 2018-07-12 RX ADMIN — ACETAMINOPHEN 1000 MG: 500 TABLET, FILM COATED ORAL at 21:39

## 2018-07-12 RX ADMIN — LEVOFLOXACIN 750 MG: 500 TABLET, FILM COATED ORAL at 18:59

## 2018-07-12 RX ADMIN — SODIUM CHLORIDE 1000 ML: 9 INJECTION, SOLUTION INTRAVENOUS at 18:12

## 2018-07-12 ASSESSMENT — PAIN SCALES - GENERAL: PAINLEVEL_OUTOF10: 0

## 2018-07-12 ASSESSMENT — ENCOUNTER SYMPTOMS
CHEST TIGHTNESS: 0
BACK PAIN: 0
NAUSEA: 0
SHORTNESS OF BREATH: 0
DIARRHEA: 0
SORE THROAT: 0
RHINORRHEA: 0
EYE PAIN: 0
VOMITING: 0
ABDOMINAL PAIN: 0
COUGH: 0
PHOTOPHOBIA: 0

## 2018-07-12 NOTE — ED NOTES
Bed: 13  Expected date: 7/12/18  Expected time: 4:09 PM  Means of arrival:   Comments:  ankita Zaragoza RN  07/12/18 0704

## 2018-07-12 NOTE — ED PROVIDER NOTES
[address] [facility]  eMERGENCY dEPARTMENT eNCOUnter      Pt Name: Neva Rubio  MRN: [id number]  [place] 10/19/1933  Date of evaluation: 7/12/2018  Provider: Karthik Soriano MD    CHIEF COMPLAINT       Chief Complaint   Patient presents with   Rebeca Oris Fall     pt brought from [facility] living, unwitnessed fall, ems report staff told them pt was found ayanna in the floor unresponsive, and report pt is not  back to her normal loc       HISTORY OF PRESENT ILLNESS   (Location/Symptom, Timing/Onset, Context/Setting, Quality, Duration, Modifying Factors, Severity)  Note limiting factors. Neva Rubio is a 80 y.o. female who presents to the emergency department with Being found on the floor unconscious. She lives that [facility], and was found unconscious on the floor today. Sons received a call from the [facility] saying that they're calling an ambulance and sending the patient here for evaluation. She does have a history of dementia, does not recall the fall or anything really surrounding it. As per EMS report for chest patient is being sent for syncope and supposedly fell walking and was \"out for 5-10 minutes prior to arrival of EMS\". She denies any pain or symptoms at this point in time. States feels her normal self. She is oriented to person and place but not to time. Patient does have a history of dementia. Nursing Notes were reviewed. REVIEW OF SYSTEMS    (2-9 systems for level 4, 10 or more for level 5)     Review of Systems   Constitutional: Negative for activity change, appetite change, chills and fever. HENT: Negative for congestion, nosebleeds, rhinorrhea and sore throat. Eyes: Negative for photophobia, pain and visual disturbance. Respiratory: Negative for cough, chest tightness and shortness of breath. Cardiovascular: Negative for chest pain and palpitations. Gastrointestinal: Negative for abdominal pain, diarrhea, nausea and vomiting.    Genitourinary: Negative for dysuria, flank pain and hematuria. Musculoskeletal: Negative for arthralgias, back pain, myalgias and neck pain. Skin: Negative for rash and wound. Neurological: Negative for dizziness, syncope, speech difficulty, weakness, light-headedness and headaches. Psychiatric/Behavioral: Negative for confusion, hallucinations and suicidal ideas. PAST MEDICAL HISTORY     Past Medical History:   Diagnosis Date    Anxiety     GERD (gastroesophageal reflux disease)     Macular degeneration     Palliative care encounter 09/22/2017    Pneumonia     Weakness        SURGICAL HISTORY       Past Surgical History:   Procedure Laterality Date    CATARACT REMOVAL WITH IMPLANT  10/2015    right eye    CHOLECYSTECTOMY      COLON SURGERY      straighten kink    HIP SURGERY      right replacement    [place] OFFICE/OUTPT VISIT,PROCEDURE ONLY N/A 2/5/2018    EGD-Dr Mendez-Gastritis, gastric polyp, Schatzki's ring in the distal esophagus    [place] OFFICE/OUTPT VISIT,PROCEDURE ONLY N/A 2/6/2018    Colonoscopy--Dr Mendez-Diverticulosis, internal/external hemorrhoids       CURRENT MEDICATIONS       Discharge Medication List as of 7/12/2018 10:07 PM      CONTINUE these medications which have NOT CHANGED    Details   nitroGLYCERIN (NITROSTAT) 0.4 MG SL tablet up to max of 3 total doses.  If no relief after 1 dose, call 911., Disp-25 tablet, R-3Normal      IRON PO Take by mouthHistorical Med      metoprolol succinate (TOPROL XL) 25 MG extended release tablet Take 1 tablet by mouth daily, Disp-30 tablet, R-3Normal      omeprazole (PRILOSEC) 20 MG capsule Take 20 mg by mouth 2 times daily      midodrine (PROAMATINE) 2.5 MG tablet Take 5 mg by mouth 2 times daily Historical Med      levothyroxine (SYNTHROID) 100 MCG tablet Take 100 mcg by mouth Daily      topiramate (TOPAMAX) 25 MG tablet Take 25 mg by mouth daily      atorvastatin (LIPITOR) 20 MG tablet Take 20 mg by mouth nightly Historical Med      tolterodine (DETROL LA) 4 MG ER capsule Take 4 mg by mouth nightly Historical Med             ALLERGIES     Rocephin [ceftriaxone]    FAMILY HISTORY       Family History   Problem Relation Age of Onset    Heart Disease Mother     Stroke Mother     Heart Disease Father     Heart Disease Sister     Obesity Sister     No Known Problems Brother        SOCIAL HISTORY       Social History     Social History    Marital status:      Spouse name: N/A    Number of children: N/A    Years of education: N/A     Social History Main Topics    Smoking status: Never Smoker    Smokeless tobacco: None    Alcohol use No    Drug use: No    Sexual activity: Not Asked     Other Topics Concern    None     Social History Narrative    None       SCREENINGS    Sina Coma Scale  Eye Opening: Spontaneous  Best Verbal Response: Oriented (oriented to person, name, , confused to place and time)  Best Motor Response: Obeys commands  Sina Coma Scale Score: 15      PHYSICAL EXAM    (up to 7 for level 4, 8 or more for level 5)     ED Triage Vitals [18 1630]   BP Temp Temp Source Pulse Resp SpO2 Height Weight   (!) 161/70 99.3 °F (37.4 °C) Oral 78 -- 94 % 5' 4\" (1.626 m) 99 lb (44.9 kg)       Physical Exam   Constitutional: She is oriented to person, place, and time. She appears well-developed and well-nourished. No distress. HENT:   Head: Normocephalic. Mouth/Throat: Oropharynx is clear and moist.   No hemotympanum, no septal hematoma, no midface instability concerning for LeFort fracture. Small area of redness on the right temporal frontal region. No abrasion, no bleeding. Eyes: EOM are normal. Pupils are equal, round, and reactive to light. No scleral icterus. Neck: Normal range of motion. Neck supple. No tracheal deviation present. No cervical spinous process tenderness, step-offs, deformities   Cardiovascular: Normal rate, regular rhythm, normal heart sounds and intact distal pulses. Exam reveals no gallop and no friction rub.

## 2018-07-14 LAB
ORGANISM: ABNORMAL
URINE CULTURE, ROUTINE: ABNORMAL
URINE CULTURE, ROUTINE: ABNORMAL

## 2018-07-15 ENCOUNTER — HOSPITAL ENCOUNTER (INPATIENT)
Age: 83
LOS: 3 days | Discharge: NURSING, REHAB OR LONG-TERM CARE | DRG: 643 | End: 2018-07-18
Attending: EMERGENCY MEDICINE | Admitting: FAMILY MEDICINE
Payer: MEDICARE

## 2018-07-15 ENCOUNTER — APPOINTMENT (OUTPATIENT)
Dept: CT IMAGING | Age: 83
DRG: 643 | End: 2018-07-15
Payer: MEDICARE

## 2018-07-15 DIAGNOSIS — G93.40 ENCEPHALOPATHY: ICD-10-CM

## 2018-07-15 DIAGNOSIS — F03.90 DEMENTIA WITHOUT BEHAVIORAL DISTURBANCE, UNSPECIFIED DEMENTIA TYPE: Primary | ICD-10-CM

## 2018-07-15 DIAGNOSIS — E87.1 HYPONATREMIA: ICD-10-CM

## 2018-07-15 LAB
ALBUMIN SERPL-MCNC: 3.7 G/DL (ref 3.5–5.2)
ALP BLD-CCNC: 81 U/L (ref 35–104)
ALT SERPL-CCNC: 14 U/L (ref 5–33)
ANION GAP SERPL CALCULATED.3IONS-SCNC: 12 MMOL/L (ref 7–19)
AST SERPL-CCNC: 22 U/L (ref 5–32)
BACTERIA: NEGATIVE /HPF
BASE EXCESS ARTERIAL: -6.3 MMOL/L (ref -2–2)
BILIRUB SERPL-MCNC: 0.5 MG/DL (ref 0.2–1.2)
BILIRUBIN URINE: NEGATIVE
BLOOD, URINE: NEGATIVE
BUN BLDV-MCNC: 19 MG/DL (ref 8–23)
CALCIUM SERPL-MCNC: 8.7 MG/DL (ref 8.8–10.2)
CARBOXYHEMOGLOBIN ARTERIAL: 0 % (ref 0–5)
CHLORIDE BLD-SCNC: 89 MMOL/L (ref 98–111)
CLARITY: CLEAR
CO2: 20 MMOL/L (ref 22–29)
COLOR: YELLOW
CREAT SERPL-MCNC: 1 MG/DL (ref 0.5–0.9)
EPITHELIAL CELLS, UA: 1 /HPF (ref 0–5)
GFR NON-AFRICAN AMERICAN: 53
GLUCOSE BLD-MCNC: 137 MG/DL (ref 74–109)
GLUCOSE URINE: NEGATIVE MG/DL
HCO3 ARTERIAL: 17.9 MMOL/L (ref 22–26)
HCT VFR BLD CALC: 37.1 % (ref 37–47)
HEMOGLOBIN, ART, EXTENDED: 12.2 G/DL (ref 12–16)
HEMOGLOBIN: 12.1 G/DL (ref 12–16)
HYALINE CASTS: 2 /HPF (ref 0–8)
INR BLD: 0.9 (ref 0.88–1.18)
KETONES, URINE: NEGATIVE MG/DL
LEUKOCYTE ESTERASE, URINE: NEGATIVE
MCH RBC QN AUTO: 28.8 PG (ref 27–31)
MCHC RBC AUTO-ENTMCNC: 32.6 G/DL (ref 33–37)
MCV RBC AUTO: 88.3 FL (ref 81–99)
METHEMOGLOBIN ARTERIAL: 0.3 %
NITRITE, URINE: NEGATIVE
O2 CONTENT ARTERIAL: 16.4 ML/DL
O2 SAT, ARTERIAL: 95.4 %
O2 THERAPY: ABNORMAL
PCO2 ARTERIAL: 31 MMHG (ref 35–45)
PDW BLD-RTO: 19.1 % (ref 11.5–14.5)
PH ARTERIAL: 7.37 (ref 7.35–7.45)
PH UA: 7.5
PLATELET # BLD: 167 K/UL (ref 130–400)
PMV BLD AUTO: 10 FL (ref 9.4–12.3)
PO2 ARTERIAL: 84 MMHG (ref 80–100)
POTASSIUM SERPL-SCNC: 5 MMOL/L (ref 3.5–5)
POTASSIUM, WHOLE BLOOD: 4.3
PROTEIN UA: 30 MG/DL
PROTHROMBIN TIME: 12.1 SEC (ref 12–14.6)
RBC # BLD: 4.2 M/UL (ref 4.2–5.4)
RBC UA: 1 /HPF (ref 0–4)
SODIUM BLD-SCNC: 121 MMOL/L (ref 136–145)
SPECIFIC GRAVITY UA: 1.01
TOTAL PROTEIN: 6.8 G/DL (ref 6.6–8.7)
TROPONIN: <0.01 NG/ML (ref 0–0.03)
URINE REFLEX TO CULTURE: ABNORMAL
UROBILINOGEN, URINE: 0.2 E.U./DL
WBC # BLD: 7.9 K/UL (ref 4.8–10.8)
WBC UA: 17 /HPF (ref 0–5)

## 2018-07-15 PROCEDURE — 84132 ASSAY OF SERUM POTASSIUM: CPT

## 2018-07-15 PROCEDURE — 36600 WITHDRAWAL OF ARTERIAL BLOOD: CPT

## 2018-07-15 PROCEDURE — 96374 THER/PROPH/DIAG INJ IV PUSH: CPT

## 2018-07-15 PROCEDURE — 99285 EMERGENCY DEPT VISIT HI MDM: CPT

## 2018-07-15 PROCEDURE — 1210000000 HC MED SURG R&B

## 2018-07-15 PROCEDURE — 2580000003 HC RX 258: Performed by: EMERGENCY MEDICINE

## 2018-07-15 PROCEDURE — 93005 ELECTROCARDIOGRAM TRACING: CPT

## 2018-07-15 PROCEDURE — 84484 ASSAY OF TROPONIN QUANT: CPT

## 2018-07-15 PROCEDURE — 85610 PROTHROMBIN TIME: CPT

## 2018-07-15 PROCEDURE — 81001 URINALYSIS AUTO W/SCOPE: CPT

## 2018-07-15 PROCEDURE — 94762 N-INVAS EAR/PLS OXIMTRY CONT: CPT

## 2018-07-15 PROCEDURE — 36415 COLL VENOUS BLD VENIPUNCTURE: CPT

## 2018-07-15 PROCEDURE — 80053 COMPREHEN METABOLIC PANEL: CPT

## 2018-07-15 PROCEDURE — 99285 EMERGENCY DEPT VISIT HI MDM: CPT | Performed by: EMERGENCY MEDICINE

## 2018-07-15 PROCEDURE — 82803 BLOOD GASES ANY COMBINATION: CPT

## 2018-07-15 PROCEDURE — 70450 CT HEAD/BRAIN W/O DYE: CPT

## 2018-07-15 PROCEDURE — 85027 COMPLETE CBC AUTOMATED: CPT

## 2018-07-15 PROCEDURE — 6360000002 HC RX W HCPCS: Performed by: EMERGENCY MEDICINE

## 2018-07-15 RX ORDER — LEVOTHYROXINE SODIUM 0.1 MG/1
100 TABLET ORAL DAILY
Status: DISCONTINUED | OUTPATIENT
Start: 2018-07-16 | End: 2018-07-18 | Stop reason: HOSPADM

## 2018-07-15 RX ORDER — LORAZEPAM 2 MG/ML
0.5 INJECTION INTRAMUSCULAR ONCE
Status: COMPLETED | OUTPATIENT
Start: 2018-07-15 | End: 2018-07-15

## 2018-07-15 RX ORDER — SODIUM CHLORIDE 9 MG/ML
INJECTION, SOLUTION INTRAVENOUS CONTINUOUS
Status: DISCONTINUED | OUTPATIENT
Start: 2018-07-15 | End: 2018-07-18 | Stop reason: HOSPADM

## 2018-07-15 RX ORDER — TOPIRAMATE 25 MG/1
25 TABLET ORAL DAILY
Status: DISCONTINUED | OUTPATIENT
Start: 2018-07-16 | End: 2018-07-18 | Stop reason: HOSPADM

## 2018-07-15 RX ORDER — MIDODRINE HYDROCHLORIDE 5 MG/1
5 TABLET ORAL 2 TIMES DAILY
Status: DISCONTINUED | OUTPATIENT
Start: 2018-07-16 | End: 2018-07-18 | Stop reason: HOSPADM

## 2018-07-15 RX ORDER — TROSPIUM CHLORIDE 20 MG/1
20 TABLET, FILM COATED ORAL NIGHTLY
Status: DISCONTINUED | OUTPATIENT
Start: 2018-07-15 | End: 2018-07-18 | Stop reason: HOSPADM

## 2018-07-15 RX ORDER — ATORVASTATIN CALCIUM 20 MG/1
20 TABLET, FILM COATED ORAL NIGHTLY
Status: DISCONTINUED | OUTPATIENT
Start: 2018-07-15 | End: 2018-07-18 | Stop reason: HOSPADM

## 2018-07-15 RX ORDER — SODIUM CHLORIDE 0.9 % (FLUSH) 0.9 %
10 SYRINGE (ML) INJECTION PRN
Status: DISCONTINUED | OUTPATIENT
Start: 2018-07-15 | End: 2018-07-18 | Stop reason: HOSPADM

## 2018-07-15 RX ORDER — METOPROLOL SUCCINATE 25 MG/1
25 TABLET, EXTENDED RELEASE ORAL DAILY
Status: DISCONTINUED | OUTPATIENT
Start: 2018-07-16 | End: 2018-07-18 | Stop reason: HOSPADM

## 2018-07-15 RX ORDER — SODIUM CHLORIDE 0.9 % (FLUSH) 0.9 %
10 SYRINGE (ML) INJECTION EVERY 12 HOURS SCHEDULED
Status: DISCONTINUED | OUTPATIENT
Start: 2018-07-15 | End: 2018-07-18 | Stop reason: HOSPADM

## 2018-07-15 RX ADMIN — SODIUM CHLORIDE: 9 INJECTION, SOLUTION INTRAVENOUS at 16:03

## 2018-07-15 RX ADMIN — LORAZEPAM 0.5 MG: 2 INJECTION INTRAMUSCULAR; INTRAVENOUS at 14:27

## 2018-07-15 ASSESSMENT — PAIN SCALES - GENERAL: PAINLEVEL_OUTOF10: 0

## 2018-07-15 NOTE — PROGRESS NOTES
7/15/2018  2:40 PM - [facility] Incoming Lab Results From [facility]     Component Results     Component Value Ref Range & Units Status Collected Lab   pH, Arterial 7.370  7.350 - 7.450 Final 07/15/2018  2:38 PM [address] [place] [address] Lab   pCO2, Arterial 31.0   35.0 - 45.0 mmHg Final 07/15/2018  2:38 PM [facility] Lab   pO2, Arterial 84.0  80.0 - 100.0 mmHg Final 07/15/2018  2:38 PM [facility] Lab   HCO3, Arterial 17.9   22.0 - 26.0 mmol/L Final 07/15/2018  2:38 PM [facility] Excess, Arterial -6.3   -2.0 - 2.0 mmol/L Final 07/15/2018  2:38 PM [address] [place] [address] Lab   Hemoglobin, Art, Extended 12.2  12.0 - 16.0 g/dL Final 07/15/2018  2:38 PM [address] [place] [address] Lab   O2 Sat, Arterial 95.4  >92 % Final 07/15/2018  2:38 PM [facility] Lab   Carboxyhgb, Arterial 0.0  0.0 - 5.0 % Final 07/15/2018  2:38 PM [facility] Lab        0.0-1.5   (Smokers 1.5-5.0)    Methemoglobin, Arterial 0.3  <1.5 % Final 07/15/2018  2:38 PM [address] [place] [address] Lab   O2 Content, Arterial 16.4          Pt on ra, rb

## 2018-07-15 NOTE — ED PROVIDER NOTES
[address] [facility]  eMERGENCY dEPARTMENT eNCOUnter      Pt Name: Clarice Mujica  MRN: [id number]  [place] 10/19/1933  Date of evaluation: 7/15/2018  Provider: Bakari Alvarez MD    CHIEF COMPLAINT       Chief Complaint   Patient presents with    Altered Mental Status     presents to er per ems with c/o ams          HISTORY OF PRESENT ILLNESS   (Location/Symptom, Timing/Onset, Context/Setting, Quality, Duration, Modifying Factors, Severity)  Note limiting factors. Clarice Mjuica is a 80 y.o. female who presents to the emergency department Due to altered mental status. Patient had a fall and was seen here 3 days ago. Negative workup at that time. Family said that she's had some general fatigue since then and some mild increase her confusion. At baseline she has mild dementia and lives in assisted living. Family said that symptoms have significant increased to the point today to where she does not know who she is or where she is oriented to family. This is very unusual for her. 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 Unable to get any significant history of the patient. She has no complaints. Family said she has not complained of pain anywhere. She was diagnosed with urinary tract infection during prior visits and has been on Levaquin since then. I reviewed urine culture which showed Klebsiella and is sensitive to Levaquin. HPI    Nursing Notes were reviewed. REVIEW OF SYSTEMS    (2-9 systems for level 4, 10 or more for level 5)     Review of Systems   Unable to perform ROS: Dementia       A complete review of systems was performed and is negative except as noted above in the HPI.        PAST MEDICAL HISTORY     Past Medical History:   Diagnosis Date    Anxiety     GERD (gastroesophageal reflux disease)     Macular degeneration     Palliative care encounter 09/22/2017    Pneumonia     Weakness          SURGICAL HISTORY       Past Surgical History:   Procedure Laterality Date    CATARACT REMOVAL WITH IMPLANT  10/2015    right eye    CHOLECYSTECTOMY      COLON SURGERY      straighten kink    HIP SURGERY      right replacement    [place] OFFICE/OUTPT VISIT,PROCEDURE ONLY N/A 2/5/2018    EGD-Dr Mendez-Gastritis, gastric polyp, Schatzki's ring in the distal esophagus    [place] OFFICE/OUTPT VISIT,PROCEDURE ONLY N/A 2/6/2018    Colonoscopy--Dr Mendez-Diverticulosis, internal/external hemorrhoids         CURRENT MEDICATIONS       Previous Medications    ATORVASTATIN (LIPITOR) 20 MG TABLET    Take 20 mg by mouth nightly     IRON PO    Take by mouth    LEVOFLOXACIN (LEVAQUIN) 500 MG TABLET    Take 1 tablet by mouth daily for 6 days    LEVOTHYROXINE (SYNTHROID) 100 MCG TABLET    Take 100 mcg by mouth Daily    METOPROLOL SUCCINATE (TOPROL XL) 25 MG EXTENDED RELEASE TABLET    Take 1 tablet by mouth daily    MIDODRINE (PROAMATINE) 2.5 MG TABLET    Take 5 mg by mouth 2 times daily     NITROGLYCERIN (NITROSTAT) 0.4 MG SL TABLET    up to max of 3 total doses. If no relief after 1 dose, call 911. OMEPRAZOLE (PRILOSEC) 20 MG CAPSULE    Take 20 mg by mouth 2 times daily    TOLTERODINE (DETROL LA) 4 MG ER CAPSULE    Take 4 mg by mouth nightly     TOPIRAMATE (TOPAMAX) 25 MG TABLET    Take 25 mg by mouth daily       ALLERGIES     Rocephin [ceftriaxone]    FAMILY HISTORY       Family History   Problem Relation Age of Onset    Heart Disease Mother     Stroke Mother     Heart Disease Father     Heart Disease Sister     Obesity Sister     No Known Problems Brother           SOCIAL HISTORY       Social History     Social History    Marital status:       Spouse name: N/A    Number of children: N/A    Years of education: N/A     Social History Main Topics    Smoking status: Never Smoker    Smokeless tobacco: Never Used    Alcohol use No    Drug use: No    Sexual activity: Not Asked     Other Topics Concern    None     Social History Narrative    None       SCREENINGS             PHYSICAL EXAM    (up to 7 for level 4, 8 or more for level 5)     ED Triage Vitals [07/15/18 1343]   BP Temp Temp src Pulse Resp SpO2 Height Weight   (!) 156/74 98.5 °F (36.9 °C) -- 78 -- 98 % 5' 5\" (1.651 m) 100 lb (45.4 kg)       Physical Exam   Constitutional: She appears well-developed. No distress. HENT:   Head: Normocephalic and atraumatic. Eyes: Pupils are equal, round, and reactive to light. No scleral icterus. Neck: Normal range of motion and full passive range of motion without pain. Neck supple. No JVD present. No spinous process tenderness present. Cardiovascular: Normal rate, regular rhythm, normal heart sounds and intact distal pulses. Pulmonary/Chest: Effort normal and breath sounds normal. No respiratory distress. Abdominal: Soft. She exhibits no distension. There is no tenderness. Musculoskeletal: She exhibits no edema or tenderness. No C, T or L-spine tenderness or step-off. Pelvis stable. No tenderness in the arms or legs   Neurological: She is alert. She is disoriented. GCS eye subscore is 4. GCS verbal subscore is 3. GCS motor subscore is 6. Limited neuro exam as patient only follows my commands intermittently. Moves all 4 extremities equally   Skin: Skin is warm and dry. Psychiatric:   RADHA   Vitals reviewed. DIAGNOSTIC RESULTS     EKG: All EKG's are interpreted by the Emergency Department Physician who either signs or Co-signs this chart in the absence of a cardiologist.    Sinus rhythm. Normal QT interval. Artifact. LVH. Left anterior fascicular block. RADIOLOGY:   Non-plain film images such as CT, Ultrasound and MRI are read by the radiologist. Plain radiographic images are visualized and preliminarily interpreted by the emergency physician with the below findings:    Interpretation per the Radiologist below, if available at the time of this note:    CT Head WO Contrast   Final Result   1. No acute intracranial abnormality is seen.        Signed by Dr Panda Hernández on 7/15/2018 3:48 PM

## 2018-07-15 NOTE — ED NOTES
1600: Dr. Mary Kate Tellez called, covering for Dr. Robson Miranda.        Unruly Charles  07/15/18 3752

## 2018-07-16 ENCOUNTER — APPOINTMENT (OUTPATIENT)
Dept: MRI IMAGING | Age: 83
DRG: 643 | End: 2018-07-16
Payer: MEDICARE

## 2018-07-16 LAB
ANION GAP SERPL CALCULATED.3IONS-SCNC: 15 MMOL/L (ref 7–19)
BASOPHILS ABSOLUTE: 0 K/UL (ref 0–0.2)
BASOPHILS RELATIVE PERCENT: 0.5 % (ref 0–1)
BUN BLDV-MCNC: 21 MG/DL (ref 8–23)
CALCIUM SERPL-MCNC: 8 MG/DL (ref 8.8–10.2)
CHLORIDE BLD-SCNC: 94 MMOL/L (ref 98–111)
CO2: 16 MMOL/L (ref 22–29)
CREAT SERPL-MCNC: 0.8 MG/DL (ref 0.5–0.9)
EOSINOPHILS ABSOLUTE: 0 K/UL (ref 0–0.6)
EOSINOPHILS RELATIVE PERCENT: 0.2 % (ref 0–5)
GFR NON-AFRICAN AMERICAN: >60
GLUCOSE BLD-MCNC: 99 MG/DL (ref 74–109)
HCT VFR BLD CALC: 34.1 % (ref 37–47)
HEMOGLOBIN: 11.1 G/DL (ref 12–16)
LYMPHOCYTES ABSOLUTE: 2 K/UL (ref 1.1–4.5)
LYMPHOCYTES RELATIVE PERCENT: 32.4 % (ref 20–40)
MCH RBC QN AUTO: 29.3 PG (ref 27–31)
MCHC RBC AUTO-ENTMCNC: 32.6 G/DL (ref 33–37)
MCV RBC AUTO: 90 FL (ref 81–99)
MONOCYTES ABSOLUTE: 0.7 K/UL (ref 0–0.9)
MONOCYTES RELATIVE PERCENT: 12.3 % (ref 0–10)
NEUTROPHILS ABSOLUTE: 3.3 K/UL (ref 1.5–7.5)
NEUTROPHILS RELATIVE PERCENT: 54.3 % (ref 50–65)
OSMOLALITY URINE: 468 MOSM/KG (ref 250–1200)
OSMOLALITY: 262 MOSM/KG (ref 275–300)
PDW BLD-RTO: 17.6 % (ref 11.5–14.5)
PLATELET # BLD: 149 K/UL (ref 130–400)
PMV BLD AUTO: 10.5 FL (ref 9.4–12.3)
POTASSIUM SERPL-SCNC: 4.1 MMOL/L (ref 3.5–5)
RBC # BLD: 3.79 M/UL (ref 4.2–5.4)
SODIUM BLD-SCNC: 125 MMOL/L (ref 136–145)
SODIUM URINE: 126 MMOL/L
WBC # BLD: 6 K/UL (ref 4.8–10.8)

## 2018-07-16 PROCEDURE — 6370000000 HC RX 637 (ALT 250 FOR IP): Performed by: FAMILY MEDICINE

## 2018-07-16 PROCEDURE — 94762 N-INVAS EAR/PLS OXIMTRY CONT: CPT

## 2018-07-16 PROCEDURE — 2580000003 HC RX 258: Performed by: FAMILY MEDICINE

## 2018-07-16 PROCEDURE — 6360000002 HC RX W HCPCS: Performed by: FAMILY MEDICINE

## 2018-07-16 PROCEDURE — 36415 COLL VENOUS BLD VENIPUNCTURE: CPT

## 2018-07-16 PROCEDURE — G8997 SWALLOW GOAL STATUS: HCPCS

## 2018-07-16 PROCEDURE — 85025 COMPLETE CBC W/AUTO DIFF WBC: CPT

## 2018-07-16 PROCEDURE — 2580000003 HC RX 258: Performed by: EMERGENCY MEDICINE

## 2018-07-16 PROCEDURE — 83930 ASSAY OF BLOOD OSMOLALITY: CPT

## 2018-07-16 PROCEDURE — 1210000000 HC MED SURG R&B

## 2018-07-16 PROCEDURE — 92610 EVALUATE SWALLOWING FUNCTION: CPT

## 2018-07-16 PROCEDURE — 6360000004 HC RX CONTRAST MEDICATION: Performed by: FAMILY MEDICINE

## 2018-07-16 PROCEDURE — 83935 ASSAY OF URINE OSMOLALITY: CPT

## 2018-07-16 PROCEDURE — A9577 INJ MULTIHANCE: HCPCS | Performed by: FAMILY MEDICINE

## 2018-07-16 PROCEDURE — G8996 SWALLOW CURRENT STATUS: HCPCS

## 2018-07-16 PROCEDURE — 70553 MRI BRAIN STEM W/O & W/DYE: CPT

## 2018-07-16 PROCEDURE — 80048 BASIC METABOLIC PNL TOTAL CA: CPT

## 2018-07-16 PROCEDURE — 84300 ASSAY OF URINE SODIUM: CPT

## 2018-07-16 RX ORDER — LEVOFLOXACIN 5 MG/ML
250 INJECTION, SOLUTION INTRAVENOUS EVERY 24 HOURS
Status: DISCONTINUED | OUTPATIENT
Start: 2018-07-16 | End: 2018-07-18 | Stop reason: HOSPADM

## 2018-07-16 RX ORDER — TOLVAPTAN 15 MG/1
15 TABLET ORAL DAILY
Status: COMPLETED | OUTPATIENT
Start: 2018-07-16 | End: 2018-07-16

## 2018-07-16 RX ADMIN — LEVOFLOXACIN 250 MG: 5 INJECTION, SOLUTION INTRAVENOUS at 15:36

## 2018-07-16 RX ADMIN — MIDODRINE HYDROCHLORIDE 5 MG: 5 TABLET ORAL at 09:36

## 2018-07-16 RX ADMIN — TROSPIUM CHLORIDE 20 MG: 20 TABLET ORAL at 20:59

## 2018-07-16 RX ADMIN — SODIUM CHLORIDE: 9 INJECTION, SOLUTION INTRAVENOUS at 22:42

## 2018-07-16 RX ADMIN — LEVOTHYROXINE SODIUM 100 MCG: 100 TABLET ORAL at 05:45

## 2018-07-16 RX ADMIN — ATORVASTATIN CALCIUM 20 MG: 20 TABLET, FILM COATED ORAL at 20:59

## 2018-07-16 RX ADMIN — GADOBENATE DIMEGLUMINE 7 ML: 529 INJECTION, SOLUTION INTRAVENOUS at 19:57

## 2018-07-16 RX ADMIN — TOLVAPTAN 15 MG: 15 TABLET ORAL at 20:59

## 2018-07-16 RX ADMIN — METOPROLOL SUCCINATE 25 MG: 25 TABLET, EXTENDED RELEASE ORAL at 09:36

## 2018-07-16 RX ADMIN — SODIUM CHLORIDE: 9 INJECTION, SOLUTION INTRAVENOUS at 09:42

## 2018-07-16 RX ADMIN — Medication 10 ML: at 20:59

## 2018-07-16 RX ADMIN — TOPIRAMATE 25 MG: 25 TABLET, FILM COATED ORAL at 09:36

## 2018-07-16 RX ADMIN — Medication 10 ML: at 09:36

## 2018-07-16 ASSESSMENT — PAIN SCALES - GENERAL: PAINLEVEL_OUTOF10: 0

## 2018-07-16 NOTE — H&P
[facility]  History and Physical    Patient:  Harsha Mackenzie  MRN: [id number]    CHIEF COMPLAINT:  confusion      PCP: Alesha Miller MD    HISTORY OF PRESENT ILLNESS:   The patient is a 80 y.o. female who presents with complaints of abrupt onset of confusion, lethargy, and difficulty swallowing. This prompted her presentation to the [facility] ER via EMS where she is noted to have profound hyponatremia, and UTI. Despite improvement in these conditions over last few hours, she still is confused and unable to eat without choking. REVIEW OF SYSTEMS:    Past Medical History:      Diagnosis Date    Anxiety     GERD (gastroesophageal reflux disease)     Macular degeneration     Palliative care encounter 09/22/2017    Pneumonia     Weakness        Past Surgical History:      Procedure Laterality Date    CATARACT REMOVAL WITH IMPLANT  10/2015    right eye    CHOLECYSTECTOMY      COLON SURGERY      straighten kink    HIP SURGERY      right replacement    [place] OFFICE/OUTPT VISIT,PROCEDURE ONLY N/A 2/5/2018    EGD-Dr Mendez-Gastritis, gastric polyp, Schatzki's ring in the distal esophagus    [place] OFFICE/OUTPT VISIT,PROCEDURE ONLY N/A 2/6/2018    Colonoscopy--Dr Mendez-Diverticulosis, internal/external hemorrhoids       Medications Prior to Admission:    Prior to Admission medications    Medication Sig Start Date End Date Taking?  Authorizing Provider   levofloxacin (LEVAQUIN) 500 MG tablet Take 1 tablet by mouth daily for 6 days 7/12/18 7/18/18 Yes Michaela Brown MD   IRON PO Take 325 mg by mouth daily    Yes Historical Provider, MD   metoprolol succinate (TOPROL XL) 25 MG extended release tablet Take 1 tablet by mouth daily 9/20/17  Yes Alesha Miller MD   omeprazole (PRILOSEC) 20 MG capsule Take 20 mg by mouth 2 times daily   Yes Historical Provider, MD   midodrine (PROAMATINE) 2.5 MG tablet Take 5 mg by mouth 2 times daily    Yes Historical Provider, MD   levothyroxine (SYNTHROID) 100 MCG otherwise negative outside listed above and HPI      Physical Exam:    Vitals: /66   Pulse 75   Temp 97.7 °F (36.5 °C) (Temporal)   Resp 18   Ht 5' 5\" (1.651 m)   Wt 88 lb (39.9 kg)   SpO2 100%   BMI 14.64 kg/m²     GENERAL: WD/WN not in acute distress, resting well in bed  HEENT: NC/AT PERRL, EOMI grossly, TM's clear, OP negative without sig erythema or exudate, neck is supple without masses or carotid bruit noted    CV: normal S1 and S2, no sig murmur, lift or gallop, normal PMI  CHEST: clear to auscultation both anterior and posterior, no rales rhonchi or wheeze appreciated. ABD: soft NT/ND with normoactive BS noted. No guarding or rebounding noted  /rectal: deferred  EXT: no cyanosis or clubbing noted, normal ROM  DERM: skin is warm and dry without sig rashes on exposed areas  PSYCH: appears mentally stable with no obvious abnormalities  NEURO: CN 2-12 apper grossly intact without sig deficits in motor or sensory       CBC:   Recent Labs      07/15/18   1420  07/16/18   0414   WBC  7.9  6.0   HGB  12.1  11.1*   PLT  167  149     BMP:    Recent Labs      07/15/18   1438  07/15/18   1505  07/16/18   0414   NA   --   121*  125*   K  4.3  5.0  4.1   CL   --   89*  94*   CO2   --   20*  16*   BUN   --   19  21   CREATININE   --   1.0*  0.8   GLUCOSE   --   137*  99     Hepatic:   Recent Labs      07/15/18   1505   AST  22   ALT  14   BILITOT  0.5   ALKPHOS  81     Troponin T:   Recent Labs      07/15/18   1505   TROPONINI  <0.01     Pro-BNP: No results for input(s): BNP in the last 72 hours. Lipids: No results for input(s): CHOL, HDL in the last 72 hours.     Invalid input(s): LDLCALCU  ABGs:   Lab Results   Component Value Date    PHART 7.370 07/15/2018    PO2ART 84.0 07/15/2018    VSL9MHB 31.0 07/15/2018     INR:   Recent Labs      07/15/18   1420   INR  0.90     -----------------------------------------------------------------  Radiology:     Ct Head Wo Contrast    Result Date: 7/15/2018  CT HEAD WO CONTRAST 7/15/2018 3:48 PM History: Confusion and delirium. Altered level of consciousness. In order to have a CT radiation dose as low as reasonably achievable Automated Exposure Control was utilized for adjustment of the mA and/or KV according to patient size. DLP in mGycm= 683. Axial, sagittal, and coronal noncontrast CT imaging of the head. Comparison is made with 07/12/2018. The visualized paranasal sinuses and mastoid air cells are clear. The brain and ventricles have an age-appropriate appearance. Age-related changes with mild diffuse cortical atrophy and periventricular white matter small vessel disease is noted. There is no hemorrhage or mass-effect. No acute infarct is seen. No calvarial abnormality. 1. No acute intracranial abnormality is seen. Signed by Dr Edgar Hernandez on 7/15/2018 3:48 PM      Assessment and Plan   1. 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 *    PLAN:    ADMIT  NEURO CHECKS  MRI TO RULE OUT CVA    TX UTI WITH LEVAQUIN    CORRECT HYPONATREMIA IN THIS PATIENT WITH CHRONIC SIADH WITH SAMSCA    FOLLOW CLINICAL COURSE EXPECTANTLY    Johanna Flores  .   7/16/2018

## 2018-07-16 NOTE — PROGRESS NOTES
Nutrition Assessment    Type and Reason for Visit: Initial, Positive Nutrition Screen    Nutrition Recommendations: Ensure BID    Malnutrition Assessment:  · Malnutrition Status: Insufficient data  · Context: Acute illness or injury  · Findings of the 6 clinical characteristics of malnutrition (Minimum of 2 out of 6 clinical characteristics is required to make the diagnosis of moderate or severe Protein Calorie Malnutrition based on AND/ASPEN Guidelines):  1. Energy Intake-Not available, not able to assess    2. Weight Loss-No significant weight loss,    3. Fat Loss-Unable to assess,    4. Muscle Loss-Unable to assess,    5. Fluid Accumulation-No significant fluid accumulation,    6.  Strength-     Nutrition Diagnosis:   · Problem: Underweight  · Etiology: related to Insufficient energy/nutrient consumption     Signs and symptoms:  as evidenced by BMI (14.7)    Nutrition Assessment:  · Subjective Assessment: +NS for chewing/swallowing difficulty, wounds. Family in room states pt \"never eats much, has always been thin. \" Wt. stable. Pt is sleeping, family requests supplement. Soft diet.    · Nutrition-Focused Physical Findings: missing teeth  · Wound Type: Pressure Ulcer, Stage I  · Current Nutrition Therapies:  · Oral Diet Orders: General, Fluid Restriction   · Oral Diet intake: 1-25%  · Oral Nutrition Supplement (ONS) Orders: None  · ONS intake:    · Anthropometric Measures:  · Ht: 5' 5\" (165.1 cm)   · Current Body Wt:    · Admission Body Wt: 88 lb (39.9 kg)  · Ideal Body Wt: 125 lb (56.7 kg), % Ideal Body    · BMI Classification: BMI <18.5 Underweight    Estimated Intake vs Estimated Needs: Intake Less Than Needs    Nutrition Risk Level: High    Nutrition Interventions:   Modify current diet, Start ONS  Continued Inpatient Monitoring    Nutrition Evaluation:   · Evaluation: Goals set   · Goals: PO 50% or more, wt. stable or gain    · Monitoring: Meal Intake, Supplement Intake, Diet Tolerance, Skin

## 2018-07-16 NOTE — PROGRESS NOTES
oral cavity residue noted post swallows. Oral transit of regular solid consistency trials primarily measured 1-2 seconds in length and min oral cavity residue was observed post swallows; residue cleared with additional dry swallows. Indicators of Pharyngeal Phase Dysfunction  Pharyngeal Phase: Exceptions  Indicators of Pharyngeal Phase Dysfunction  Delayed Swallow: Thin - cup (Suspect secondary to oral transit times.)  Decreased Laryngeal Elevation: All (Laryngeal elevation was considered to be inconsistently sluggish and inconsistently mildly decreased for swallow airway protection.)   Pharyngeal: No outward S/S penetration/aspiration was noted with any puree consistency trial or regular solid consistency trial. Despite exhibiting fast oral transit and suspected swallow delay, no outward S/S penetration/aspiration was observed with any thin H2O trial.     At this time, would recommend dysphagia III consistency. Thin liquids. Meds crushed in pudding or applesauce. TOTAL FEED. G-Code  SLP G-Codes  Functional Limitations: Swallowing  Swallow Current Status (): At least 20 percent but less than 40 percent impaired, limited or restricted  Swallow Goal Status ():  At least 20 percent but less than 40 percent impaired, limited or restricted    Electronically signed by VERA Vogt on 7/16/2018 at 3:30 PM

## 2018-07-17 LAB
ANION GAP SERPL CALCULATED.3IONS-SCNC: 14 MMOL/L (ref 7–19)
BASOPHILS ABSOLUTE: 0 K/UL (ref 0–0.2)
BASOPHILS RELATIVE PERCENT: 0.4 % (ref 0–1)
BUN BLDV-MCNC: 18 MG/DL (ref 8–23)
CALCIUM SERPL-MCNC: 8.7 MG/DL (ref 8.8–10.2)
CHLORIDE BLD-SCNC: 101 MMOL/L (ref 98–111)
CO2: 18 MMOL/L (ref 22–29)
CREAT SERPL-MCNC: 0.6 MG/DL (ref 0.5–0.9)
EOSINOPHILS ABSOLUTE: 0.1 K/UL (ref 0–0.6)
EOSINOPHILS RELATIVE PERCENT: 1.6 % (ref 0–5)
GFR NON-AFRICAN AMERICAN: >60
GLUCOSE BLD-MCNC: 96 MG/DL (ref 74–109)
HCT VFR BLD CALC: 35.9 % (ref 37–47)
HEMOGLOBIN: 12.6 G/DL (ref 12–16)
LYMPHOCYTES ABSOLUTE: 1.6 K/UL (ref 1.1–4.5)
LYMPHOCYTES RELATIVE PERCENT: 22.7 % (ref 20–40)
MCH RBC QN AUTO: 29.7 PG (ref 27–31)
MCHC RBC AUTO-ENTMCNC: 35.1 G/DL (ref 33–37)
MCV RBC AUTO: 84.7 FL (ref 81–99)
MONOCYTES ABSOLUTE: 0.7 K/UL (ref 0–0.9)
MONOCYTES RELATIVE PERCENT: 9.6 % (ref 0–10)
NEUTROPHILS ABSOLUTE: 4.5 K/UL (ref 1.5–7.5)
NEUTROPHILS RELATIVE PERCENT: 65.1 % (ref 50–65)
PDW BLD-RTO: 17.5 % (ref 11.5–14.5)
PLATELET # BLD: 122 K/UL (ref 130–400)
PLATELET SLIDE REVIEW: ABNORMAL
PMV BLD AUTO: 11 FL (ref 9.4–12.3)
POTASSIUM SERPL-SCNC: 4.9 MMOL/L (ref 3.5–5)
RBC # BLD: 4.24 M/UL (ref 4.2–5.4)
SODIUM BLD-SCNC: 133 MMOL/L (ref 136–145)
WBC # BLD: 6.9 K/UL (ref 4.8–10.8)

## 2018-07-17 PROCEDURE — G8978 MOBILITY CURRENT STATUS: HCPCS

## 2018-07-17 PROCEDURE — 6360000002 HC RX W HCPCS: Performed by: FAMILY MEDICINE

## 2018-07-17 PROCEDURE — 94762 N-INVAS EAR/PLS OXIMTRY CONT: CPT

## 2018-07-17 PROCEDURE — 6370000000 HC RX 637 (ALT 250 FOR IP): Performed by: FAMILY MEDICINE

## 2018-07-17 PROCEDURE — 92526 ORAL FUNCTION THERAPY: CPT

## 2018-07-17 PROCEDURE — 36415 COLL VENOUS BLD VENIPUNCTURE: CPT

## 2018-07-17 PROCEDURE — 2580000003 HC RX 258: Performed by: FAMILY MEDICINE

## 2018-07-17 PROCEDURE — 80048 BASIC METABOLIC PNL TOTAL CA: CPT

## 2018-07-17 PROCEDURE — 1210000000 HC MED SURG R&B

## 2018-07-17 PROCEDURE — G8979 MOBILITY GOAL STATUS: HCPCS

## 2018-07-17 PROCEDURE — 97162 PT EVAL MOD COMPLEX 30 MIN: CPT

## 2018-07-17 PROCEDURE — 85025 COMPLETE CBC W/AUTO DIFF WBC: CPT

## 2018-07-17 RX ADMIN — TOPIRAMATE 25 MG: 25 TABLET, FILM COATED ORAL at 09:18

## 2018-07-17 RX ADMIN — TROSPIUM CHLORIDE 20 MG: 20 TABLET ORAL at 21:58

## 2018-07-17 RX ADMIN — MIDODRINE HYDROCHLORIDE 5 MG: 5 TABLET ORAL at 09:19

## 2018-07-17 RX ADMIN — Medication 10 ML: at 09:19

## 2018-07-17 RX ADMIN — ATORVASTATIN CALCIUM 20 MG: 20 TABLET, FILM COATED ORAL at 21:58

## 2018-07-17 RX ADMIN — LEVOFLOXACIN 250 MG: 5 INJECTION, SOLUTION INTRAVENOUS at 16:10

## 2018-07-17 RX ADMIN — LEVOTHYROXINE SODIUM 100 MCG: 100 TABLET ORAL at 05:48

## 2018-07-17 RX ADMIN — METOPROLOL SUCCINATE 25 MG: 25 TABLET, EXTENDED RELEASE ORAL at 09:19

## 2018-07-17 NOTE — PROGRESS NOTES
Physical Therapy    Facility/Department: [facility] 4 ONCOLOGY UNIT  Initial Assessment    NAME: Abdi Fuentes  : 10/19/1933  MRN: [id number]    Date of Service: 2018    Discharge Recommendations:  Continue to assess pending progress   PT Equipment Recommendations  Other: ASSESSING    Patient Diagnosis(es): The primary encounter diagnosis was Dementia without behavioral disturbance, unspecified dementia type. Diagnoses of Encephalopathy and Hyponatremia were also pertinent to this visit. has a past medical history of Anxiety; GERD (gastroesophageal reflux disease); Macular degeneration; Palliative care encounter; Pneumonia; and Weakness. has a past surgical history that includes Cholecystectomy; Colon surgery; hip surgery; Cataract removal with implant (10/2015); pr office/outpt visit,procedure only (N/A, 2018); and pr office/outpt visit,procedure only (N/A, 2018). Restrictions     Vision/Hearing  Vision: Impaired  Vision Exceptions:  (macular degeneration, )  Hearing: Within functional limits     Subjective  General  Additional Pertinent Hx: R THR, MACULAR DEGENERATION, ORTHOSTATIC HYPOTENSION  Diagnosis: AMS, HYPONATREMIA  Follows Commands: Impaired  Other (Comment): REQUIRES ADDITIONAL CUES. HAS A FLAT AFFECT AND CONVERSES VERY LITTLE  General Comment  Comments: pt SITTING UP IN RECLINER. DTR STATES Pt WAS AWAKE MOST OF NIGHT AND IS NOW GROGGY.   Subjective  Subjective: pt STATES SHE IS WILLING TO AMB WITH PT. DTR STATES pt HAS HAD EPISODES OF ORTHOSTATIC HYPOTENSION THAT CAN BE SEVERE AND CAN CAUSE pt TO \"PASS OUT\"          Orientation  Orientation  Overall Orientation Status:  (chart notes confusion)    Social/Functional History  Social/Functional History  Type of Home: Assisted living  Home Equipment: Rolling walker  ADL Assistance:  (assist with showers and set up for dressing)  Ambulation Assistance: Independent  Transfer Assistance: Independent  Objective          AROM RLE (degrees)  RLE AROM:

## 2018-07-17 NOTE — PROGRESS NOTES
Speech Language Pathology  Facility/Department: [facility] 4 ONCOLOGY UNIT  SWALLOW THERAPY    NAME: Abdi Fuentes  : 10/19/1933  MRN: [id number]    ADMISSION DATE: 7/15/2018  ADMITTING DIAGNOSIS: has CAP (community acquired pneumonia); Generalized weakness; Hyponatremia; SIADH (syndrome of inappropriate ADH production) ([facility] [address] Syncope and collapse; Hypotension; Dermatitis; Anemia; Precordial pain; Paroxysmal atrial fibrillation ([facility] [address] TIA (transient ischemic attack); Dysarthria; Hemispheric carotid artery syndrome; Weight loss, unintentional; Transient alteration of awareness; Coagulopathy ([facility] [address] Gastric polyps; Lower esophageal ring (Schatzki); Gastritis without bleeding; Diverticulosis of large intestine without hemorrhage; Internal hemorrhoid; External hemorrhoid; and Chest pressure on her problem list.    Date of Treat: 2018  Evaluating Therapist: Terrance Rutledge    Current Diet level:  Current Diet : Soft regular  Current Liquid Diet : Thin    Reason for Referral  Abdi Fuentes was referred for a bedside swallow evaluation to assess the efficiency of her swallow function, identify signs and symptoms of aspiration and make recommendations regarding safe dietary consistencies, effective compensatory strategies, and safe eating environment. Impression  Observed patient's swallowing function. Patient exhibits slow oral prep of more solid consistencies, fast oral transit and suspected swallow delay with thin liquids, and sluggish and inconsistently mildly decreased laryngeal elevation for swallow airway protection. Even so, no outward S/S penetration/aspiration was observed with any thin liquid presentation administered during breakfast this date. Patient did exhibit inconsistent, immediate throat clears with mechanical soft consistency presentations and dysphagia III consistency presentations; question residue in the throat post swallows.  It is noted that patient was able to produce dry swallows, upon command, drinks.     Electronically signed by VERA Yeung on 7/17/2018 at 9:42 AM

## 2018-07-17 NOTE — PROGRESS NOTES
Drug use: No    Sexual activity: Not on file     Other Topics Concern    Not on file     Social History Narrative    No narrative on file       Labs:  Hematology:  Recent Labs      07/15/18   1420  18   0414  18   0403   WBC  7.9  6.0  6.9   HGB  12.1  11.1*  12.6   HCT  37.1  34.1*  35.9*   PLT  167  149  122*   INR  0.90   --    --      Chemistry:  Recent Labs      07/15/18   1505  18   0414  18   0403   NA  121*  125*  133*   K  5.0  4.1  4.9   CL  89*  94*  101   CO2  20*  16*  18*   GLUCOSE  137*  99  96   BUN  19  21  18   CREATININE  1.0*  0.8  0.6   ANIONGAP  12  15  14   LABGLOM  53*  >60  >60   CALCIUM  8.7*  8.0*  8.7*   TROPONINI  <0.01   --    --      Recent Labs      07/15/18   1505   PROT  6.8   LABALBU  3.7   AST  22   ALT  14   ALKPHOS  81   BILITOT  0.5       Objective:     Vitals: BP (!) 158/92   Pulse 108   Temp 97.4 °F (36.3 °C) (Temporal)   Resp 14   Ht 5' 5\" (1.651 m)   Wt 88 lb (39.9 kg)   SpO2 98%   BMI 14.64 kg/m²    Intake/Output Summary (Last 24 hours) at 18 0756  Last data filed at 18 0454   Gross per 24 hour   Intake              300 ml   Output                0 ml   Net              300 ml    Temp (24hrs), Av.5 °F (36.4 °C), Min:97.3 °F (36.3 °C), Max:97.7 °F (36.5 °C)    Glucose:  No results for input(s): POCGLU in the last 72 hours.   Physical Examination:   General appearance - alert, well appearing, and in no distress and oriented to person, place, and time  Mental status - alert, oriented to person, place, and time, normal mood, behavior, speech, dress, motor activity, and thought processes  Eyes - pupils equal and reactive, extraocular eye movements intact  Ears - bilateral TM's and external ear canals normal, hearing grossly normal bilaterally  Mouth - mucous membranes moist, pharynx normal without lesions  Neck - supple, no significant adenopathy  Lymphatics - no palpable lymphadenopathy, no hepatosplenomegaly  Chest - clear to

## 2018-07-17 NOTE — CARE COORDINATION
Referral to [facility]. SW notified Jeanette Fajardo at [facility] of incoming referral. MD planning to [facility] 7/18.     [facility] [place] [address] P: [phone] F: [phone]

## 2018-07-18 VITALS
TEMPERATURE: 97.6 F | BODY MASS INDEX: 14.66 KG/M2 | DIASTOLIC BLOOD PRESSURE: 80 MMHG | WEIGHT: 88 LBS | HEIGHT: 65 IN | RESPIRATION RATE: 16 BRPM | SYSTOLIC BLOOD PRESSURE: 148 MMHG | HEART RATE: 88 BPM | OXYGEN SATURATION: 95 %

## 2018-07-18 LAB
ANION GAP SERPL CALCULATED.3IONS-SCNC: 12 MMOL/L (ref 7–19)
BASOPHILS ABSOLUTE: 0 K/UL (ref 0–0.2)
BASOPHILS RELATIVE PERCENT: 0.7 % (ref 0–1)
BUN BLDV-MCNC: 14 MG/DL (ref 8–23)
CALCIUM SERPL-MCNC: 8 MG/DL (ref 8.8–10.2)
CHLORIDE BLD-SCNC: 102 MMOL/L (ref 98–111)
CO2: 18 MMOL/L (ref 22–29)
CREAT SERPL-MCNC: 0.5 MG/DL (ref 0.5–0.9)
EOSINOPHILS ABSOLUTE: 0.2 K/UL (ref 0–0.6)
EOSINOPHILS RELATIVE PERCENT: 3 % (ref 0–5)
GFR NON-AFRICAN AMERICAN: >60
GLUCOSE BLD-MCNC: 84 MG/DL (ref 74–109)
HCT VFR BLD CALC: 33.7 % (ref 37–47)
HEMOGLOBIN: 11.6 G/DL (ref 12–16)
LYMPHOCYTES ABSOLUTE: 1.8 K/UL (ref 1.1–4.5)
LYMPHOCYTES RELATIVE PERCENT: 29.4 % (ref 20–40)
MCH RBC QN AUTO: 29.1 PG (ref 27–31)
MCHC RBC AUTO-ENTMCNC: 34.4 G/DL (ref 33–37)
MCV RBC AUTO: 84.5 FL (ref 81–99)
MONOCYTES ABSOLUTE: 0.7 K/UL (ref 0–0.9)
MONOCYTES RELATIVE PERCENT: 11.3 % (ref 0–10)
NEUTROPHILS ABSOLUTE: 3.4 K/UL (ref 1.5–7.5)
NEUTROPHILS RELATIVE PERCENT: 55.1 % (ref 50–65)
PDW BLD-RTO: 17.9 % (ref 11.5–14.5)
PLATELET # BLD: 129 K/UL (ref 130–400)
PLATELET SLIDE REVIEW: ABNORMAL
PMV BLD AUTO: 11.1 FL (ref 9.4–12.3)
POTASSIUM SERPL-SCNC: 4.6 MMOL/L (ref 3.5–5)
RBC # BLD: 3.99 M/UL (ref 4.2–5.4)
SODIUM BLD-SCNC: 132 MMOL/L (ref 136–145)
WBC # BLD: 6.1 K/UL (ref 4.8–10.8)

## 2018-07-18 PROCEDURE — 36415 COLL VENOUS BLD VENIPUNCTURE: CPT

## 2018-07-18 PROCEDURE — 94762 N-INVAS EAR/PLS OXIMTRY CONT: CPT

## 2018-07-18 PROCEDURE — 92526 ORAL FUNCTION THERAPY: CPT

## 2018-07-18 PROCEDURE — 6370000000 HC RX 637 (ALT 250 FOR IP): Performed by: FAMILY MEDICINE

## 2018-07-18 PROCEDURE — 80048 BASIC METABOLIC PNL TOTAL CA: CPT

## 2018-07-18 PROCEDURE — 2580000003 HC RX 258: Performed by: FAMILY MEDICINE

## 2018-07-18 PROCEDURE — 85025 COMPLETE CBC W/AUTO DIFF WBC: CPT

## 2018-07-18 RX ORDER — LEVOFLOXACIN 500 MG/1
250 TABLET, FILM COATED ORAL DAILY
Qty: 3 TABLET | Refills: 0 | Status: SHIPPED | OUTPATIENT
Start: 2018-07-18 | End: 2018-07-24

## 2018-07-18 RX ADMIN — Medication 10 ML: at 08:38

## 2018-07-18 RX ADMIN — MIDODRINE HYDROCHLORIDE 5 MG: 5 TABLET ORAL at 08:38

## 2018-07-18 RX ADMIN — METOPROLOL SUCCINATE 25 MG: 25 TABLET, EXTENDED RELEASE ORAL at 08:38

## 2018-07-18 RX ADMIN — LEVOTHYROXINE SODIUM 100 MCG: 100 TABLET ORAL at 05:48

## 2018-07-18 RX ADMIN — TOPIRAMATE 25 MG: 25 TABLET, FILM COATED ORAL at 08:37

## 2018-07-18 NOTE — PROGRESS NOTES
Speech Language Pathology  Facility/Department: [facility] 4 ONCOLOGY UNIT  SWALLOW THERAPY    NAME: Kimberly Schultz  : 10/19/1933  MRN: [id number]    ADMISSION DATE: 7/15/2018  ADMITTING DIAGNOSIS: has CAP (community acquired pneumonia); Generalized weakness; Hyponatremia; SIADH (syndrome of inappropriate ADH production) ([facility] [address] Syncope and collapse; Hypotension; Dermatitis; Anemia; Precordial pain; Paroxysmal atrial fibrillation ([facility] [address] TIA (transient ischemic attack); Dysarthria; Hemispheric carotid artery syndrome; Weight loss, unintentional; Transient alteration of awareness; Coagulopathy ([facility] [address] Gastric polyps; Lower esophageal ring (Schatzki); Gastritis without bleeding; Diverticulosis of large intestine without hemorrhage; Internal hemorrhoid; External hemorrhoid; and Chest pressure on her problem list.    Date of Treat: 2018  Evaluating Therapist: Debra Azar    Current Diet level:  Current Diet : Soft regular  Current Liquid Diet : Thin    Reason for Referral  Kimberly Schultz was referred for a bedside swallow evaluation to assess the efficiency of her swallow function, identify signs and symptoms of aspiration and make recommendations regarding safe dietary consistencies, effective compensatory strategies, and safe eating environment. Impression  Observed patient's swallowing function. Patient exhibits slow oral prep of more solid consistencies, inconsistently fast oral transit and suspected swallow delay with thin liquids, and sluggish, inconsistently mildly decreased laryngeal elevation for swallow airway protection. Even so, no outward S/S penetration/aspiration was observed with any dysphagia III consistency presentation or thin liquid presentation administered during treatment session this date; it is noted that patient's family member altered bites/drinks during breakfast.    At this time, recommend continuation current diet. Meds in pudding or applesauce. Thank you for this consult.      Treatment III consistency presentation or thin liquid presentation; it is noted that patient's family member altered bites/drinks during breakfast.     At this time, recommend continuation current diet. Meds in pudding or applesauce. No further acute speech therapy is felt to be warranted. Patient to be D/C.     Electronically signed by VERA Morales on 7/18/2018 at 7:58 AM

## 2018-07-18 NOTE — PLAN OF CARE
Problem: Falls - Risk of:  Goal: Will remain free from falls  Will remain free from falls   Outcome: Ongoing    Goal: Absence of physical injury  Absence of physical injury   Outcome: Ongoing      Problem: FLUID AND ELECTROLYTE IMBALANCE  Goal: Able to tolerate medications  Able to tolerate medications     Outcome: Ongoing      Problem: Nutrition  Goal: Optimal nutrition therapy  Nutrition Problem: Underweight  Intervention: Food and/or Nutrient Delivery: Modify current diet, Start ONS  Nutritional Goals: PO 50% or more, wt. stable or gain     Outcome: Ongoing

## 2018-07-19 LAB
EKG P AXIS: 78 DEGREES
EKG P-R INTERVAL: 202 MS
EKG Q-T INTERVAL: 354 MS
EKG QRS DURATION: 112 MS
EKG QTC CALCULATION (BAZETT): 410 MS
EKG T AXIS: 56 DEGREES

## 2018-09-13 ENCOUNTER — APPOINTMENT (OUTPATIENT)
Dept: CT IMAGING | Age: 83
DRG: 682 | End: 2018-09-13
Payer: MEDICARE

## 2018-09-13 ENCOUNTER — APPOINTMENT (OUTPATIENT)
Dept: GENERAL RADIOLOGY | Age: 83
DRG: 682 | End: 2018-09-13
Payer: MEDICARE

## 2018-09-13 ENCOUNTER — HOSPITAL ENCOUNTER (INPATIENT)
Age: 83
LOS: 3 days | Discharge: NURSING, REHAB OR LONG-TERM CARE | DRG: 682 | End: 2018-09-17
Attending: EMERGENCY MEDICINE | Admitting: FAMILY MEDICINE
Payer: MEDICARE
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
## 2018-09-13 DIAGNOSIS — N17.9 ACUTE KIDNEY INJURY (HCC): Primary | ICD-10-CM

## 2018-09-13 DIAGNOSIS — E86.0 DEHYDRATION: ICD-10-CM

## 2018-09-13 DIAGNOSIS — E87.1 HYPONATREMIA: ICD-10-CM

## 2018-09-13 LAB
ALBUMIN SERPL-MCNC: 3.8 G/DL (ref 3.5–5.2)
ALP BLD-CCNC: 92 U/L (ref 35–104)
ALT SERPL-CCNC: 28 U/L (ref 5–33)
ANION GAP SERPL CALCULATED.3IONS-SCNC: 11 MMOL/L (ref 7–19)
APTT: 27.5 SEC (ref 26–36.2)
AST SERPL-CCNC: 37 U/L (ref 5–32)
BASOPHILS ABSOLUTE: 0 K/UL (ref 0–0.2)
BASOPHILS RELATIVE PERCENT: 0.2 % (ref 0–1)
BILIRUB SERPL-MCNC: 0.4 MG/DL (ref 0.2–1.2)
BILIRUBIN URINE: NEGATIVE
BLOOD, URINE: NEGATIVE
BUN BLDV-MCNC: 30 MG/DL (ref 8–23)
CALCIUM SERPL-MCNC: 9 MG/DL (ref 8.8–10.2)
CHLORIDE BLD-SCNC: 92 MMOL/L (ref 98–111)
CLARITY: CLEAR
CO2: 20 MMOL/L (ref 22–29)
COLOR: YELLOW
CREAT SERPL-MCNC: 1.6 MG/DL (ref 0.5–0.9)
EOSINOPHILS ABSOLUTE: 0.1 K/UL (ref 0–0.6)
EOSINOPHILS RELATIVE PERCENT: 0.5 % (ref 0–5)
GFR NON-AFRICAN AMERICAN: 31
GLUCOSE BLD-MCNC: 104 MG/DL (ref 74–109)
GLUCOSE URINE: NEGATIVE MG/DL
HCT VFR BLD CALC: 33 % (ref 37–47)
HEMOGLOBIN: 10.8 G/DL (ref 12–16)
INR BLD: 1.01 (ref 0.88–1.18)
KETONES, URINE: NEGATIVE MG/DL
LEUKOCYTE ESTERASE, URINE: NEGATIVE
LYMPHOCYTES ABSOLUTE: 1.3 K/UL (ref 1.1–4.5)
LYMPHOCYTES RELATIVE PERCENT: 12.7 % (ref 20–40)
MCH RBC QN AUTO: 30.4 PG (ref 27–31)
MCHC RBC AUTO-ENTMCNC: 32.7 G/DL (ref 33–37)
MCV RBC AUTO: 93 FL (ref 81–99)
MONOCYTES ABSOLUTE: 0.7 K/UL (ref 0–0.9)
MONOCYTES RELATIVE PERCENT: 7.3 % (ref 0–10)
NEUTROPHILS ABSOLUTE: 7.8 K/UL (ref 1.5–7.5)
NEUTROPHILS RELATIVE PERCENT: 78.8 % (ref 50–65)
NITRITE, URINE: NEGATIVE
PDW BLD-RTO: 14.2 % (ref 11.5–14.5)
PH UA: 6.5
PLATELET # BLD: 192 K/UL (ref 130–400)
PMV BLD AUTO: 10.4 FL (ref 9.4–12.3)
POTASSIUM SERPL-SCNC: 5.1 MMOL/L (ref 3.5–5)
PROTEIN UA: NEGATIVE MG/DL
PROTHROMBIN TIME: 13.2 SEC (ref 12–14.6)
RBC # BLD: 3.55 M/UL (ref 4.2–5.4)
SODIUM BLD-SCNC: 123 MMOL/L (ref 136–145)
SPECIFIC GRAVITY UA: 1.01
TOTAL CK: 236 U/L (ref 26–192)
TOTAL PROTEIN: 6.7 G/DL (ref 6.6–8.7)
URINE REFLEX TO CULTURE: NORMAL
UROBILINOGEN, URINE: 0.2 E.U./DL
WBC # BLD: 9.9 K/UL (ref 4.8–10.8)

## 2018-09-13 PROCEDURE — 80053 COMPREHEN METABOLIC PANEL: CPT

## 2018-09-13 PROCEDURE — 2580000003 HC RX 258: Performed by: NURSE PRACTITIONER

## 2018-09-13 PROCEDURE — 82550 ASSAY OF CK (CPK): CPT

## 2018-09-13 PROCEDURE — G0378 HOSPITAL OBSERVATION PER HR: HCPCS

## 2018-09-13 PROCEDURE — 99285 EMERGENCY DEPT VISIT HI MDM: CPT

## 2018-09-13 PROCEDURE — 85025 COMPLETE CBC W/AUTO DIFF WBC: CPT

## 2018-09-13 PROCEDURE — 99285 EMERGENCY DEPT VISIT HI MDM: CPT | Performed by: EMERGENCY MEDICINE

## 2018-09-13 PROCEDURE — 72125 CT NECK SPINE W/O DYE: CPT

## 2018-09-13 PROCEDURE — 85730 THROMBOPLASTIN TIME PARTIAL: CPT

## 2018-09-13 PROCEDURE — 93005 ELECTROCARDIOGRAM TRACING: CPT

## 2018-09-13 PROCEDURE — 73521 X-RAY EXAM HIPS BI 2 VIEWS: CPT

## 2018-09-13 PROCEDURE — 70450 CT HEAD/BRAIN W/O DYE: CPT

## 2018-09-13 PROCEDURE — 85610 PROTHROMBIN TIME: CPT

## 2018-09-13 PROCEDURE — 72192 CT PELVIS W/O DYE: CPT

## 2018-09-13 PROCEDURE — 36415 COLL VENOUS BLD VENIPUNCTURE: CPT

## 2018-09-13 RX ORDER — 0.9 % SODIUM CHLORIDE 0.9 %
1000 INTRAVENOUS SOLUTION INTRAVENOUS ONCE
Status: COMPLETED | OUTPATIENT
Start: 2018-09-13 | End: 2018-09-14

## 2018-09-13 RX ADMIN — SODIUM CHLORIDE 1000 ML: 9 INJECTION, SOLUTION INTRAVENOUS at 22:03

## 2018-09-14 PROBLEM — E43 SEVERE MALNUTRITION (HCC): Chronic | Status: ACTIVE | Noted: 2018-09-14

## 2018-09-14 LAB
ANION GAP SERPL CALCULATED.3IONS-SCNC: 12 MMOL/L (ref 7–19)
BUN BLDV-MCNC: 25 MG/DL (ref 8–23)
CALCIUM SERPL-MCNC: 8.4 MG/DL (ref 8.8–10.2)
CHLORIDE BLD-SCNC: 98 MMOL/L (ref 98–111)
CO2: 19 MMOL/L (ref 22–29)
CREAT SERPL-MCNC: 1.3 MG/DL (ref 0.5–0.9)
EKG P AXIS: 75 DEGREES
EKG P-R INTERVAL: 220 MS
EKG Q-T INTERVAL: 402 MS
EKG QRS DURATION: 114 MS
EKG QTC CALCULATION (BAZETT): 435 MS
EKG T AXIS: 69 DEGREES
GFR NON-AFRICAN AMERICAN: 39
GLUCOSE BLD-MCNC: 84 MG/DL (ref 74–109)
OSMOLALITY URINE: 277 MOSM/KG (ref 250–1200)
POTASSIUM SERPL-SCNC: 4.7 MMOL/L (ref 3.5–5)
SODIUM BLD-SCNC: 129 MMOL/L (ref 136–145)

## 2018-09-14 PROCEDURE — 1210000000 HC MED SURG R&B

## 2018-09-14 PROCEDURE — 6370000000 HC RX 637 (ALT 250 FOR IP): Performed by: PHYSICIAN ASSISTANT

## 2018-09-14 PROCEDURE — 96372 THER/PROPH/DIAG INJ SC/IM: CPT

## 2018-09-14 PROCEDURE — 83935 ASSAY OF URINE OSMOLALITY: CPT

## 2018-09-14 PROCEDURE — 81003 URINALYSIS AUTO W/O SCOPE: CPT

## 2018-09-14 PROCEDURE — 2580000003 HC RX 258: Performed by: NURSE PRACTITIONER

## 2018-09-14 PROCEDURE — 36415 COLL VENOUS BLD VENIPUNCTURE: CPT

## 2018-09-14 PROCEDURE — 6360000002 HC RX W HCPCS: Performed by: NURSE PRACTITIONER

## 2018-09-14 PROCEDURE — 80048 BASIC METABOLIC PNL TOTAL CA: CPT

## 2018-09-14 RX ORDER — MIDODRINE HYDROCHLORIDE 5 MG/1
5 TABLET ORAL 2 TIMES DAILY
Status: DISCONTINUED | OUTPATIENT
Start: 2018-09-14 | End: 2018-09-17 | Stop reason: HOSPADM

## 2018-09-14 RX ORDER — METOPROLOL SUCCINATE 50 MG/1
25 TABLET, EXTENDED RELEASE ORAL DAILY
Status: DISCONTINUED | OUTPATIENT
Start: 2018-09-14 | End: 2018-09-17 | Stop reason: HOSPADM

## 2018-09-14 RX ORDER — SODIUM CHLORIDE 0.9 % (FLUSH) 0.9 %
10 SYRINGE (ML) INJECTION PRN
Status: DISCONTINUED | OUTPATIENT
Start: 2018-09-14 | End: 2018-09-17 | Stop reason: HOSPADM

## 2018-09-14 RX ORDER — FAMOTIDINE 20 MG/1
20 TABLET, FILM COATED ORAL DAILY
Status: DISCONTINUED | OUTPATIENT
Start: 2018-09-15 | End: 2018-09-17 | Stop reason: HOSPADM

## 2018-09-14 RX ORDER — SODIUM CHLORIDE 0.9 % (FLUSH) 0.9 %
10 SYRINGE (ML) INJECTION EVERY 12 HOURS SCHEDULED
Status: DISCONTINUED | OUTPATIENT
Start: 2018-09-14 | End: 2018-09-17 | Stop reason: HOSPADM

## 2018-09-14 RX ORDER — ACETAMINOPHEN 325 MG/1
650 TABLET ORAL EVERY 4 HOURS PRN
Status: DISCONTINUED | OUTPATIENT
Start: 2018-09-14 | End: 2018-09-17 | Stop reason: HOSPADM

## 2018-09-14 RX ORDER — SODIUM CHLORIDE 9 MG/ML
INJECTION, SOLUTION INTRAVENOUS CONTINUOUS
Status: DISCONTINUED | OUTPATIENT
Start: 2018-09-14 | End: 2018-09-16

## 2018-09-14 RX ORDER — TROSPIUM CHLORIDE 20 MG/1
20 TABLET, FILM COATED ORAL NIGHTLY
Status: DISCONTINUED | OUTPATIENT
Start: 2018-09-14 | End: 2018-09-17 | Stop reason: HOSPADM

## 2018-09-14 RX ORDER — FAMOTIDINE 20 MG/1
20 TABLET, FILM COATED ORAL 2 TIMES DAILY
Status: DISCONTINUED | OUTPATIENT
Start: 2018-09-14 | End: 2018-09-14

## 2018-09-14 RX ORDER — LEVOTHYROXINE SODIUM 0.1 MG/1
100 TABLET ORAL DAILY
Status: DISCONTINUED | OUTPATIENT
Start: 2018-09-14 | End: 2018-09-17 | Stop reason: HOSPADM

## 2018-09-14 RX ADMIN — METOPROLOL SUCCINATE 25 MG: 50 TABLET, EXTENDED RELEASE ORAL at 09:39

## 2018-09-14 RX ADMIN — SODIUM CHLORIDE: 9 INJECTION, SOLUTION INTRAVENOUS at 00:35

## 2018-09-14 RX ADMIN — LEVOTHYROXINE SODIUM 100 MCG: 0.1 TABLET ORAL at 09:39

## 2018-09-14 RX ADMIN — ENOXAPARIN SODIUM 40 MG: 40 INJECTION SUBCUTANEOUS at 09:41

## 2018-09-14 RX ADMIN — TROSPIUM CHLORIDE 20 MG: 20 TABLET ORAL at 20:58

## 2018-09-14 RX ADMIN — MIDODRINE HYDROCHLORIDE 5 MG: 5 TABLET ORAL at 20:58

## 2018-09-14 RX ADMIN — SODIUM CHLORIDE: 9 INJECTION, SOLUTION INTRAVENOUS at 20:58

## 2018-09-14 RX ADMIN — FAMOTIDINE 20 MG: 20 TABLET ORAL at 09:39

## 2018-09-14 RX ADMIN — MIDODRINE HYDROCHLORIDE 5 MG: 5 TABLET ORAL at 09:39

## 2018-09-14 NOTE — ED PROVIDER NOTES
Attending Supervisory Note/Shared Visit   I have personally performed a face to face diagnostic evaluation on this patient. I have reviewed the mid-levels findings and agree. Briefly, patient presents to the ED secondary to fall. Family relates decreased by mouth intake and apparent increased weakness. Patient currently resides in an assisted living facility. On arrival to the ED patient is alert and speaking. Family reports she has a history of baseline dementia. On my exam: Patient is alert, speaking, pleasantly confused. Her lungs are clear bilaterally. No respiratory distress. Cardiac exam is unremarkable for murmur. Her abdomen was soft with no focal tenderness. No evidence of pedal edema noted. Mucous membranes are dry. Review of her laboratory studies demonstrate evidence of acute kidney injury with elevation of her creatinine/BUN. Her serum potassium is slightly elevated at 5.1. Sodium decreased at 123. Patient will require admission for IV fluids, monitoring and further evaluation. Case is reviewed with Dr. Virgilio Mata regarding inpatient admission. FINAL IMPRESSION      1. Acute kidney injury ([address]    2. Hyponatremia    3. Dehydration    4.  Fall, initial encounter          Chastity Jerome MD  Attending Emergency Physician       Chastity Jerome MD  09/13/18 [id number]

## 2018-09-14 NOTE — PROGRESS NOTES
Nutrition Assessment    Type and Reason for Visit: Initial, Consult    Nutrition Recommendations: Start Magic cups TID. Malnutrition Assessment:  · Malnutrition Status: Meets the criteria for severe malnutrition  · Context: Chronic illness  · Findings of the 6 clinical characteristics of malnutrition (Minimum of 2 out of 6 clinical characteristics is required to make the diagnosis of moderate or severe Protein Calorie Malnutrition based on AND/ASPEN Guidelines):  1. Energy Intake-Not available,      2. Weight Loss-No significant weight loss,    3. Fat Loss-Severe subcutaneous fat loss, Triceps  4. Muscle Loss-Severe muscle mass loss, Temples (temporalis muscle), Clavicles (pectoralis and deltoids), Interosseous  5. Fluid Accumulation-Mild fluid accumulation, Extremities  6.  Strength-     Nutrition Diagnosis:   · Problem: Severe malnutrition, in context of chronic illness  · Etiology: related to Cognitive or neurological impairment     Signs and symptoms:  as evidenced by Severe loss of subcutaneous fat, Severe muscle loss    Nutrition Assessment:  · Subjective Assessment: Consulted for poor intake. Pt is confused, alone in room and asking for her daughter. Unable to obtain hx. Per chart, pt's wt has been stable past year.   · Nutrition-Focused Physical Findings: confusion, missing teeth  · Wound Type: None  · Current Nutrition Therapies:  · Oral Diet Orders: Dysphagia 2   · Oral Diet intake: Unable to assess  · Oral Nutrition Supplement (ONS) Orders: None  · Anthropometric Measures:  · Ht: 5' 5\" (165.1 cm)   · Current Body Wt:    · Admission Body Wt: 82 lb (37.2 kg)  · Ideal Body Wt: 125 lb (56.7 kg), % Ideal Body    · BMI Classification: BMI <18.5 Underweight    Estimated Intake vs Estimated Needs: Intake Less Than Needs    Nutrition Risk Level: High    Nutrition Interventions:   Continue current diet, Start ONS  Continued Inpatient Monitoring    Nutrition Evaluation:   · Evaluation: Goals set

## 2018-09-14 NOTE — PROGRESS NOTES
Subjective :      Silvia Stephens is a 80 y.o. female referral for left heel and buttocks. Patient has a Other: no open areas, blanchable erythema and bony prominences which is located on the L heel, Coccyx. Objective:        Wound:  Coccyx no open areas. Skin has a brownish color to skin, noted blanchable erythema to left side of coccyx. Mepilex sacral shaped dressing placed to coccyx. Daughter at bedside states she is always a little red there. Patient sits in recliner chair at home. Left lateral foot bruised area 1.5cm x 1cm x 0cm  Left lateral heel blanchable erythema, slight spongy, proximal to his area is a light bruised area  Heel boots placed to patient feet, with return demonstration with daughter. Discussed using boots or pillows to offload pressure from heels when in bed or in the recliner chair. Mid back with blanchable erythema over spinal bony prominence, mepilex dressing placed over the area to pad and protect. Discussed with daughter padding and protecting this area from pressure. All dressings dior/date/timed. Wound Description: see abve  This was originally noted on 9/14/18  Today's visit: 9/14/18      Assessment :      Admission chief c/o was found on floor at home. Noted VRE contact isolation. Isolation cart ordered from central supply. Patient weight 82 lbs, 5'5\"    Plan :      Plan for wound:pressure ulcer prevention  Dress per physician order:    Pressure Ulcer Prevention Protocol reviewed and updated. Discussed Redd Taylor patient nurse today.         Guerline Lane RN 9/14/2018

## 2018-09-14 NOTE — ED PROVIDER NOTES
[facility] [address]  eMERGENCY dEPARTMENT eNCOUnter      Pt Name: Nery Rosas  MRN: [id number]  Jesusgfdiamond 10/19/1933  Date of evaluation: 9/13/2018  Provider: MARY Dunn    CHIEF COMPLAINT       Chief Complaint   Patient presents with    Fall         HISTORY OF PRESENT ILLNESS  (Location/Symptom, Timing/Onset, Context/Setting, Quality, Duration, Modifying Factors, Severity.)   Nery Rosas is a 80 y.o. female who presents to the emergency department With chief complaint of a possible fall. The patient is a resident at [facility] assisted living and when staff went in to administer her nighttime medications they found her sitting in the floor. Patient suffers from dementia and therefore she was a poor historian and denies any chief complaint or any musculoskeletal complaint. At this time Valentin Degroot felt the patient should be seen and evaluated for a potential fall. The patient has dementia however she does not have any chief complaints today. The history is provided by the patient. Nursing Notes were reviewed and I agree. REVIEW OF SYSTEMS    (2-9 systems for level 4, 10 or more for level 5)     Review of Systems   All other systems reviewed and are negative. Except as noted above the remainder of the review of systems was reviewed and negative.        PAST MEDICAL HISTORY     Past Medical History:   Diagnosis Date    Anxiety     GERD (gastroesophageal reflux disease)     Macular degeneration     Palliative care encounter 09/22/2017    Pneumonia     Weakness          SURGICAL HISTORY       Past Surgical History:   Procedure Laterality Date    CATARACT REMOVAL WITH IMPLANT  10/2015    right eye    CHOLECYSTECTOMY      COLON SURGERY      straighten kink    HIP SURGERY      right replacement    DC OFFICE/OUTPT VISIT,PROCEDURE ONLY N/A 2/5/2018    EGD-Dr Angles-Gastritis, gastric polyp, Schatzki's ring in the distal esophagus    DC OFFICE/OUTPT VISIT,PROCEDURE ONLY Pulse: 84 80 83 85   Resp: 14 16 14 18   Temp:   98.1 °F (36.7 °C) 97.6 °F (36.4 °C)   TempSrc:    Temporal   SpO2: 98% 100% 97% 100%   Weight:    82 lb 6.4 oz (37.4 kg)   Height:               MDM  Number of Diagnoses or Management Options  Acute kidney injury [facility]):   Dehydration:   Fall, initial encounter:   Hyponatremia:   Diagnosis management comments: Briefly the patient presents to the ED today with a presumed fall and she was found in the floor of her assisted living facility. Patient suffers from dementia she does not offer any information related to a fall or not. At this time we have performed a CT of the head, neck and pelvic region along with basic laboratory studies and EKG. Her EKG did not show any arrhythmias. EKG was normal sinus rhythm heart rate of 70 without any ST elevations or depressions. There was some LVH noted and an anterior Q waves otherwise unremarkable. At this time her laboratory study shows she is hyponatremic she has a new MIHIR. At this time we have discussed the laboratory studies and CT scans with the son at bedside and I have discussed the case with Zoraida MONROE, if he is on-call for Dr. Celso Santos her primary care. He agrees for inpatient admission for hydration and observation. PROCEDURES:    Procedures      FINAL IMPRESSION      1. Acute kidney injury ([address]    2. Hyponatremia    3. Dehydration    4.  Fall, initial encounter          DISPOSITION/PLAN   DISPOSITION Admitted 09/13/2018 10:27:52 PM      PATIENT REFERRED TO:  Paige Hayward MD  [address] [facility] [phone]            DISCHARGE MEDICATIONS:  Current Discharge Medication List          (Please note that portions of this note were completed with a voice recognition program.  Efforts were made to edit the dictations but occasionally words are mis-transcribed.)    Anisha Arce, MARY Arce, MARY  09/14/18 1464

## 2018-09-14 NOTE — PLAN OF CARE
Problem: Nutrition  Goal: Optimal nutrition therapy  Nutrition Problem: Severe malnutrition, in context of chronic illness  Intervention: Food and/or Nutrient Delivery: Continue current diet, Start ONS  Nutritional Goals: PO 50% or more, wt stable or gain, maintain skin integrity    Outcome: Ongoing

## 2018-09-14 NOTE — ED NOTES
Spoke with pt's nurse from [facility]; She states that she went into the patient's room to give medications and pt was lying in the floor and appeared to have passed out. She states that she was difficult to arouse but did wake up and didn't know what was going on. She did not complain of any pain and did not have any signs of injury.       Javon Mcdonald RN  09/13/18 [id number]

## 2018-09-14 NOTE — H&P
appearing to be in any acute distress. HENT:   Head: Normocephalic and atraumatic. Eyes: Pupils are equal, round, and reactive to light. Conjunctivae and EOM are normal. Right eye exhibits no discharge. Left eye exhibits no discharge. Neck: Normal range of motion. Neck supple. Cardiovascular: Normal rate, regular rhythm and normal heart sounds. No murmur heard. Pulmonary/Chest: Effort normal and breath sounds normal. No respiratory distress. She has no wheezes. She has no rales. Abdominal: Soft. Bowel sounds are normal. She exhibits no distension. There is no tenderness. Musculoskeletal: Normal range of motion. She exhibits no tenderness. Very prominent spinous process. Mild kyphosis noted to her thoracic region. No tenderness noted on my exam. No vertebral step-off or deformity. Patient is moving all extremities. +5 strength and motor function socially to the patient's upper and lower extremities bilaterally. Neurological: She is alert. Coordination normal.   Patient has pleasantly confused. She has dementia. According to family this is her baseline. Skin: Skin is warm and dry. She is not diaphoretic. CBC:   Recent Labs      09/13/18 2034   WBC  9.9   HGB  10.8*   PLT  192     BMP:    Recent Labs      09/13/18 2034 09/14/18   0215   NA  123*  129*   K  5.1*  4.7   CL  92*  98   CO2  20*  19*   BUN  30*  25*   CREATININE  1.6*  1.3*   GLUCOSE  104  84     Hepatic:   Recent Labs      09/13/18 2034   AST  37*   ALT  28   BILITOT  0.4   ALKPHOS  92     Troponin T: No results for input(s): TROPONINI in the last 72 hours. Pro-BNP: No results for input(s): BNP in the last 72 hours. Lipids: No results for input(s): CHOL, HDL in the last 72 hours.     Invalid input(s): LDLCALCU  ABGs:   Lab Results   Component Value Date    PHART 7.370 07/15/2018    PO2ART 84.0 07/15/2018    TUT1WWJ 31.0 07/15/2018     INR:   Recent Labs      09/13/18 2034   INR  1.01 osseous injury in the pelvis. 2. Severe osteopenia can limit visualization of nondisplaced fractures. 3. Urinary bladder distention. Signed by Dr Clem Guerra on 9/13/2018 9:24 PM      Assessment and Plan       Principal Problem:    MIHIR (acute kidney injury)     Active Problems:Treatment recommendations    Hyponatremia    MIHIR (acute kidney injury)     Elevated creatinine kinase suspect secondary to fallmonitor renal function, IV fluids    Anemia chronic disease    Dehydration    Gait instability with fall    MalnutritionBMI 13.7    Alzheimer's vascular dementia    GERDomeprazole, will change to less nephrotoxic Pepcid    Hypotensionon ProAmatine    HypothyroidismSynthroid 100 µg, will check TSH and T4    HyperlipidemiaDC statin at this time with recent elevated creatinine kinase and fall      Very pleasant elderly female accompanied by her daughter resides at [facility]  To emergency department after she was found on the floor/fall weakness and fatigue chronic in nature. Office visit 1 week ago treated for UTI which is resolved. Hospitalization couple months ago for deconditioning, hyponatremia, malnutrition, UTI, dehydration. Medically stable at this time. Labs are improved. We'll get physical therapy,occupational therapy as well as dietary consulted. Repeat labs in the morning. Work on gait stability. Return to assisted living over the weekend vs Monday upon Dr. Brower Devoid return. Extensive diagnostic workup reviewed unremarkable. Autumn Morales  . 9/14/2018     Reviewed events of last p.m. We will work with PT and OT for gait stabilization. Hopefully be a return to assisted care living on Monday of next week. Workup initiated and reviewed today. The patient was seen on rounds today. Reviewed the last 24 hours of labs and x-rays that are available. Updated overnight status with nursing staff. Reviewed the case with Elie Moon PA-C.  All questions were answered to the patient's/family's understanding. Patient is medically stable, please see orders for further details. I have discussed the care of Lorena Knee, including pertinent history and exam findings with the ARNP/PA. I have seen and examined the patient and the key elements of all parts of the encounter have been performed by me. I agree with the assessment and plan as outlined by the ARNP/PA. Please refer to my separate note for complete documentation.      Electronically signed by Evelin Emanuel MD on 9/14/2018 at 8:47 AM

## 2018-09-14 NOTE — PROGRESS NOTES
4 Eyes Skin Assessment    Franca Bailon is being assessed upon: Admission  I agree that Keenan Romero, along with Tommy Lover RN (either 2 RN's or 1 LPN and 1 RN) have performed a thorough Head to Toe Skin Assessment on the patient. ALL assessment sites listed below have been assessed. Areas assessed by both nurses:     [x]   Head, Face, and Ears   [x]   Shoulders, Back, and Chest  [x]   Arms, Elbows, and Hands   [x]   Coccyx, Sacrum, and Ischium  [x]   Legs, Feet, and Heels    Does the Patient have Skin Breakdown?  No    [place] Prevention initiated: Yes  Wound Care Orders initiated: No    C nurse consulted for Pressure Injury (Stage 3,4, Unstageable, DTI, NWPT, and Complex wounds) and New or Established Ostomies: No        Primary Nurse eSignature: Erum Tellez RN on 9/14/2018 at 1:01 AM      Co-Signer eSignature: Electronically signed by Minerva Brar RN on 9/14/18 at 2:45 AM

## 2018-09-15 LAB
ANION GAP SERPL CALCULATED.3IONS-SCNC: 10 MMOL/L (ref 7–19)
BUN BLDV-MCNC: 14 MG/DL (ref 8–23)
CALCIUM SERPL-MCNC: 8 MG/DL (ref 8.8–10.2)
CHLORIDE BLD-SCNC: 105 MMOL/L (ref 98–111)
CO2: 17 MMOL/L (ref 22–29)
CREAT SERPL-MCNC: 0.8 MG/DL (ref 0.5–0.9)
GFR NON-AFRICAN AMERICAN: >60
GLUCOSE BLD-MCNC: 70 MG/DL (ref 74–109)
HCT VFR BLD CALC: 30.3 % (ref 37–47)
HEMOGLOBIN: 9.8 G/DL (ref 12–16)
MCH RBC QN AUTO: 30.8 PG (ref 27–31)
MCHC RBC AUTO-ENTMCNC: 32.3 G/DL (ref 33–37)
MCV RBC AUTO: 95.3 FL (ref 81–99)
PDW BLD-RTO: 14.4 % (ref 11.5–14.5)
PLATELET # BLD: 161 K/UL (ref 130–400)
PMV BLD AUTO: 10.6 FL (ref 9.4–12.3)
POTASSIUM SERPL-SCNC: 4.5 MMOL/L (ref 3.5–5)
RBC # BLD: 3.18 M/UL (ref 4.2–5.4)
SODIUM BLD-SCNC: 132 MMOL/L (ref 136–145)
WBC # BLD: 4.2 K/UL (ref 4.8–10.8)

## 2018-09-15 PROCEDURE — 85027 COMPLETE CBC AUTOMATED: CPT

## 2018-09-15 PROCEDURE — 36415 COLL VENOUS BLD VENIPUNCTURE: CPT

## 2018-09-15 PROCEDURE — 97116 GAIT TRAINING THERAPY: CPT

## 2018-09-15 PROCEDURE — G8978 MOBILITY CURRENT STATUS: HCPCS

## 2018-09-15 PROCEDURE — G8979 MOBILITY GOAL STATUS: HCPCS

## 2018-09-15 PROCEDURE — 2580000003 HC RX 258: Performed by: INTERNAL MEDICINE

## 2018-09-15 PROCEDURE — 2580000003 HC RX 258: Performed by: NURSE PRACTITIONER

## 2018-09-15 PROCEDURE — 6360000002 HC RX W HCPCS: Performed by: NURSE PRACTITIONER

## 2018-09-15 PROCEDURE — 6370000000 HC RX 637 (ALT 250 FOR IP): Performed by: PHYSICIAN ASSISTANT

## 2018-09-15 PROCEDURE — 1210000000 HC MED SURG R&B

## 2018-09-15 PROCEDURE — 80048 BASIC METABOLIC PNL TOTAL CA: CPT

## 2018-09-15 PROCEDURE — 97162 PT EVAL MOD COMPLEX 30 MIN: CPT

## 2018-09-15 PROCEDURE — 6370000000 HC RX 637 (ALT 250 FOR IP): Performed by: NURSE PRACTITIONER

## 2018-09-15 RX ADMIN — MIDODRINE HYDROCHLORIDE 5 MG: 5 TABLET ORAL at 20:03

## 2018-09-15 RX ADMIN — SODIUM CHLORIDE: 9 INJECTION, SOLUTION INTRAVENOUS at 05:32

## 2018-09-15 RX ADMIN — FAMOTIDINE 20 MG: 20 TABLET ORAL at 08:58

## 2018-09-15 RX ADMIN — MIDODRINE HYDROCHLORIDE 5 MG: 5 TABLET ORAL at 08:58

## 2018-09-15 RX ADMIN — METOPROLOL SUCCINATE 25 MG: 50 TABLET, EXTENDED RELEASE ORAL at 08:58

## 2018-09-15 RX ADMIN — ACETAMINOPHEN 650 MG: 325 TABLET ORAL at 11:13

## 2018-09-15 RX ADMIN — SODIUM CHLORIDE: 9 INJECTION, SOLUTION INTRAVENOUS at 20:04

## 2018-09-15 RX ADMIN — TROSPIUM CHLORIDE 20 MG: 20 TABLET ORAL at 20:02

## 2018-09-15 RX ADMIN — LEVOTHYROXINE SODIUM 100 MCG: 0.1 TABLET ORAL at 05:32

## 2018-09-15 RX ADMIN — ENOXAPARIN SODIUM 30 MG: 30 INJECTION SUBCUTANEOUS at 08:57

## 2018-09-15 ASSESSMENT — PAIN DESCRIPTION - PAIN TYPE: TYPE: ACUTE PAIN

## 2018-09-15 ASSESSMENT — PAIN DESCRIPTION - ORIENTATION: ORIENTATION: MID

## 2018-09-15 ASSESSMENT — PAIN SCALES - WONG BAKER: WONGBAKER_NUMERICALRESPONSE: 4

## 2018-09-15 ASSESSMENT — PAIN DESCRIPTION - LOCATION: LOCATION: BACK

## 2018-09-15 ASSESSMENT — PAIN SCALES - GENERAL: PAINLEVEL_OUTOF10: 5

## 2018-09-15 NOTE — PROGRESS NOTES
Chief Complaint:  Fall      Interval History:     She walked with PT. Denies focal weakness or numbness. No headache    Review of Systems:   General ROS: no chills or fever  Respiratory ROS: no cough, shortness of breath, or wheezing  Cardiovascular ROS: no chest pain or dyspnea on exertion  Gastrointestinal ROS: no abdominal pain, diarrhea or nausea/vomiting       Vitals: /71   Pulse 69   Temp 96.8 °F (36 °C) (Temporal)   Resp 16   Ht 5' 5\" (1.651 m)   Wt 82 lb 6.4 oz (37.4 kg)   SpO2 99%   BMI 13.71 kg/m²   24 hour intake/output:  Intake/Output Summary (Last 24 hours) at 09/15/18 1543  Last data filed at 09/15/18 1230   Gross per 24 hour   Intake          3198.95 ml   Output                0 ml   Net          3198.95 ml     Last 3 weights:   Wt Readings from Last 3 Encounters:   09/14/18 82 lb 6.4 oz (37.4 kg)   07/15/18 88 lb (39.9 kg)   07/12/18 99 lb (44.9 kg)         Physical Exam:     General Appearance:    Alert, cooperative, 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 distress, blind  Head:    N/A  Throat:   N/A  Neck:   N/A  Lungs:   Clear to auscultation,respirations regular, even and unlabored  Heart:    Regular rhythm and normal rate, normal S1 and S2, no murmur, no gallop  Abdomen:     Normal bowel sounds, no masses, no organomegaly, soft, non-tender,   non-distended, no guarding, no rebound      Extremities:   No edema, no cyanosis, no clubbing  Pulses:   N/A  Skin:   N/A  Lymph nodes:   N/A  Neurologic:   Strength equal all 4 ext, CN intact         Results Review:      Lab:  Recent Results (from the past 24 hour(s))   Basic Metabolic Panel    Collection Time: 09/15/18  3:35 AM   Result Value Ref Range    Sodium 132 (L) 136 - 145 mmol/L    Potassium 4.5 3.5 - 5.0 mmol/L    Chloride 105 98 - 111 mmol/L    CO2 17 (L) 22 - 29 mmol/L    Anion Gap 10 7 - 19 mmol/L    Glucose 70 (L) 74 - 109 mg/dL    BUN 14 8 - 23 mg/dL    CREATININE 0.8 0.5 - 0.9 mg/dL    GFR Non-African American >60 >60    Calcium 8.0 (L) 8.8 - 10.2 mg/dL   CBC    Collection Time: 09/15/18  3:35 AM   Result Value Ref Range    WBC 4.2 (L) 4.8 - 10.8 K/uL    RBC 3.18 (L) 4.20 - 5.40 M/uL    Hemoglobin 9.8 (L) 12.0 - 16.0 g/dL    Hematocrit 30.3 (L) 37.0 - 47.0 %    MCV 95.3 81.0 - 99.0 fL    MCH 30.8 27.0 - 31.0 pg    MCHC 32.3 (L) 33.0 - 37.0 g/dL    RDW 14.4 11.5 - 14.5 %    Platelets 313 635 - 200 K/uL    MPV 10.6 9.4 - 12.3 fL        Imaging:  Imaging study reports reviewed      ASSESSMENT:    Principal Problem:    MIHIR (acute kidney injury) ([facility] [address]  Active Problems:    Hyponatremia    Syncope and collapse    Paroxysmal atrial fibrillation (HCC)    MIHIR (acute kidney injury) ([facility] [address]    Severe malnutrition (HCC)  Resolved Problems:    * No resolved hospital problems. *    Fall of unclear etiology with evidence of MIHIR, volume depletion, hyponatremia. All improving with IVF. No focal neurologic findings. PLAN:    1. Slow IVF  2. BMP in AM  3.   PT      Jaky Llanos  09/15/18  3:43 PM

## 2018-09-15 NOTE — PROGRESS NOTES
Physical Therapy    Facility/Department: [facility] 4 ONCOLOGY UNIT  Initial Assessment    NAME: Julee Gardner  : 10/19/1933  MRN: [id number]    Date of Service: 9/15/2018    Discharge Recommendations:  Continue to assess pending progress        Patient Diagnosis(es): The primary encounter diagnosis was Acute kidney injury ([facility] [address] Diagnoses of Hyponatremia, Dehydration, and Fall, initial encounter were also pertinent to this visit. has a past medical history of Anxiety; GERD (gastroesophageal reflux disease); Macular degeneration; Palliative care encounter; Pneumonia; and Weakness. has a past surgical history that includes Cholecystectomy; Colon surgery; hip surgery; Cataract removal with implant (10/2015); pr office/outpt visit,procedure only (N/A, 2018); and pr office/outpt visit,procedure only (N/A, 2018). Restrictions  Restrictions/Precautions  Restrictions/Precautions: Fall Risk  Vision/Hearing  Vision: Impaired  Vision Exceptions: Legally blind  Hearing: Within functional limits     Subjective  General  Diagnosis: MIHIR, FALL  General Comment  Comments: Pt WAS ADMITTED AFTER BEING FOUND UNCONCIOUS IN FLOOR AT [facility]  Subjective  Subjective: Pt HESITANT TO MOVE DUE TO BACK PAIN BUT WILLING WITH ENCOURAGEMENT FROM STAFF AND DTR  Pain Screening  Patient Currently in Pain: Yes  Pain Assessment  Pain Assessment: Faces  Hearn-Baker Pain Rating: Hurts little more  Pain Type: Acute pain  Pain Location: Back  Pain Orientation: Mid  Vital Signs  Patient Currently in Pain: Yes       Orientation  Orientation  Overall Orientation Status: Impaired  Orientation Level: Oriented to person;Disoriented to situation;Disoriented to time    Social/Functional History  Social/Functional History  Type of Home: Assisted living [facility])  Home Equipment: Rolling walker  Additional Comments: HAS ASSIST AS NEEDED FOR ADL'S.  RECEIVES HOME HEALTH  Objective     Observation/Palpation  Posture: Poor  Observation: KYPHOTIC WITH PROMINENT THORACIC SPINOUS PROCESSES    PROM RLE (degrees)  RLE PROM: WFL  PROM LLE (degrees)  LLE PROM: WFL  Strength RLE  Comment: GROSSLY +3/5  Strength LLE  Comment: GROSSLY +3/5        Bed mobility  Supine to Sit: Minimal assistance  Transfers  Sit to Stand: Minimal Assistance  Bed to Chair: Minimal assistance  Ambulation 1  Device: Rolling Walker  Assistance: Minimal assistance  Quality of Gait: DEC STEP LENGTH, UNABLE TO STEER WALKER DUE TO VISUAL IMPAIRMENT  Distance: 10'     Balance  Sitting - Dynamic: Fair;+  Standing - Dynamic: Fair;-        Assessment   Body structures, Functions, Activity limitations: Decreased functional mobility ; Decreased endurance;Decreased balance;Decreased ROM; Decreased strength;Decreased safe awareness;Decreased vision/visual deficit  Assessment: MAX PHYSICAL ASSIST TO GUIDE WALKER  REQUIRES PT FOLLOW UP: Yes  Activity Tolerance  Activity Tolerance: Patient Tolerated treatment well         Plan   Plan  Times per week: AT LEAST 6-7  Current Treatment Recommendations: Strengthening, ROM, Balance Training, Functional Mobility Training, Transfer Training, Gait Training, Patient/Caregiver Education & Training, Safety Education & Training    G-Code  PT G-Codes  Functional Assessment Tool Used: BED TO CHAIR  Functional Limitation: Mobility: Walking and moving around  Mobility: Walking and Moving Around Current Status (): At least 40 percent but less than 60 percent impaired, limited or restricted  Mobility: Walking and Moving Around Goal Status ():  At least 1 percent but less than 20 percent impaired, limited or restricted  OutComes Score                                           AM-PAC Score             Goals  Short term goals  Time Frame for Short term goals: 14 DAYS  Short term goal 1: BED MOB SBA  Short term goal 2: TRANSFERS CGA  Short term goal 3: ' RW CGA       Therapy Time   Individual Concurrent Group Co-treatment   Time In           Time Out           Minutes

## 2018-09-15 NOTE — PROGRESS NOTES
Mipalex placed on bony prominences of back to prevent breakdown. Spine is re in lower thoracic region.

## 2018-09-16 LAB
ANION GAP SERPL CALCULATED.3IONS-SCNC: 9 MMOL/L (ref 7–19)
BUN BLDV-MCNC: 10 MG/DL (ref 8–23)
CALCIUM SERPL-MCNC: 8.1 MG/DL (ref 8.8–10.2)
CHLORIDE BLD-SCNC: 105 MMOL/L (ref 98–111)
CO2: 20 MMOL/L (ref 22–29)
CREAT SERPL-MCNC: 0.7 MG/DL (ref 0.5–0.9)
GFR NON-AFRICAN AMERICAN: >60
GLUCOSE BLD-MCNC: 74 MG/DL (ref 74–109)
OSMOLALITY URINE: 260 MOSM/KG (ref 250–1200)
POTASSIUM SERPL-SCNC: 4.6 MMOL/L (ref 3.5–5)
SODIUM BLD-SCNC: 134 MMOL/L (ref 136–145)
SODIUM URINE: 100 MMOL/L

## 2018-09-16 PROCEDURE — 36415 COLL VENOUS BLD VENIPUNCTURE: CPT

## 2018-09-16 PROCEDURE — 6370000000 HC RX 637 (ALT 250 FOR IP): Performed by: PHYSICIAN ASSISTANT

## 2018-09-16 PROCEDURE — 80048 BASIC METABOLIC PNL TOTAL CA: CPT

## 2018-09-16 PROCEDURE — 1210000000 HC MED SURG R&B

## 2018-09-16 PROCEDURE — 97116 GAIT TRAINING THERAPY: CPT

## 2018-09-16 PROCEDURE — 6360000002 HC RX W HCPCS: Performed by: NURSE PRACTITIONER

## 2018-09-16 PROCEDURE — 84300 ASSAY OF URINE SODIUM: CPT

## 2018-09-16 PROCEDURE — 83935 ASSAY OF URINE OSMOLALITY: CPT

## 2018-09-16 PROCEDURE — 97530 THERAPEUTIC ACTIVITIES: CPT

## 2018-09-16 PROCEDURE — 2580000003 HC RX 258: Performed by: NURSE PRACTITIONER

## 2018-09-16 RX ADMIN — METOPROLOL SUCCINATE 25 MG: 50 TABLET, EXTENDED RELEASE ORAL at 10:18

## 2018-09-16 RX ADMIN — Medication 10 ML: at 10:21

## 2018-09-16 RX ADMIN — FAMOTIDINE 20 MG: 20 TABLET ORAL at 10:18

## 2018-09-16 RX ADMIN — MIDODRINE HYDROCHLORIDE 5 MG: 5 TABLET ORAL at 10:18

## 2018-09-16 RX ADMIN — TROSPIUM CHLORIDE 20 MG: 20 TABLET ORAL at 19:51

## 2018-09-16 RX ADMIN — Medication 10 ML: at 19:51

## 2018-09-16 RX ADMIN — LEVOTHYROXINE SODIUM 100 MCG: 0.1 TABLET ORAL at 05:53

## 2018-09-16 RX ADMIN — ENOXAPARIN SODIUM 30 MG: 30 INJECTION SUBCUTANEOUS at 10:21

## 2018-09-16 RX ADMIN — MIDODRINE HYDROCHLORIDE 5 MG: 5 TABLET ORAL at 19:51

## 2018-09-16 ASSESSMENT — PAIN SCALES - WONG BAKER: WONGBAKER_NUMERICALRESPONSE: 0

## 2018-09-16 NOTE — PROGRESS NOTES
Result Value Ref Range    Sodium 134 (L) 136 - 145 mmol/L    Potassium 4.6 3.5 - 5.0 mmol/L    Chloride 105 98 - 111 mmol/L    CO2 20 (L) 22 - 29 mmol/L    Anion Gap 9 7 - 19 mmol/L    Glucose 74 74 - 109 mg/dL    BUN 10 8 - 23 mg/dL    CREATININE 0.7 0.5 - 0.9 mg/dL    GFR Non-African American >60 >60    Calcium 8.1 (L) 8.8 - 10.2 mg/dL        Imaging:  Imaging study reports reviewed      ASSESSMENT:    Principal Problem:    MIHIR (acute kidney injury) ([facility] [address]  Active Problems:    Hyponatremia    Syncope and collapse    Paroxysmal atrial fibrillation (HCC)    MIHIR (acute kidney injury) (HCC)    Severe malnutrition (HCC)  Resolved Problems:    * No resolved hospital problems. *    Fall of unclear etiology with evidence of MIHIR, volume depletion, hyponatremia. All improving with IVF. On neurologic exam again today there are no lateralizing or focal signs or symptoms. It is difficult to tell how much of her issues with gait in feeding herself or related to her blindness versus other issues. There is no clear evidence of focal deficit to suggest stroke. PLAN:    1. Discontinue IV fluid and repeat labs in the a.m. 2. Continue physical therapy  3. Family is looking at skilled nursing facility placement as a do not feel that assisted living as an adequate level of care at this point. I think that is appropriate.     Carlyn Wayland  09/16/18  11:29 AM

## 2018-09-17 VITALS
BODY MASS INDEX: 13.73 KG/M2 | WEIGHT: 82.4 LBS | HEIGHT: 65 IN | TEMPERATURE: 97.2 F | DIASTOLIC BLOOD PRESSURE: 69 MMHG | RESPIRATION RATE: 18 BRPM | SYSTOLIC BLOOD PRESSURE: 134 MMHG | OXYGEN SATURATION: 98 % | HEART RATE: 66 BPM

## 2018-09-17 LAB
ANION GAP SERPL CALCULATED.3IONS-SCNC: 10 MMOL/L (ref 7–19)
BUN BLDV-MCNC: 11 MG/DL (ref 8–23)
CALCIUM SERPL-MCNC: 8.8 MG/DL (ref 8.8–10.2)
CHLORIDE BLD-SCNC: 96 MMOL/L (ref 98–111)
CO2: 23 MMOL/L (ref 22–29)
CREAT SERPL-MCNC: 0.6 MG/DL (ref 0.5–0.9)
GFR NON-AFRICAN AMERICAN: >60
GLUCOSE BLD-MCNC: 80 MG/DL (ref 74–109)
POTASSIUM SERPL-SCNC: 4.3 MMOL/L (ref 3.5–5)
SODIUM BLD-SCNC: 129 MMOL/L (ref 136–145)

## 2018-09-17 PROCEDURE — 80048 BASIC METABOLIC PNL TOTAL CA: CPT

## 2018-09-17 PROCEDURE — 6370000000 HC RX 637 (ALT 250 FOR IP): Performed by: NURSE PRACTITIONER

## 2018-09-17 PROCEDURE — 6370000000 HC RX 637 (ALT 250 FOR IP): Performed by: PHYSICIAN ASSISTANT

## 2018-09-17 PROCEDURE — 36415 COLL VENOUS BLD VENIPUNCTURE: CPT

## 2018-09-17 PROCEDURE — 2580000003 HC RX 258: Performed by: NURSE PRACTITIONER

## 2018-09-17 PROCEDURE — 6360000002 HC RX W HCPCS: Performed by: NURSE PRACTITIONER

## 2018-09-17 RX ORDER — TOPIRAMATE 25 MG/1
25 TABLET ORAL DAILY
Qty: 60 TABLET | Refills: 3 | Status: SHIPPED | OUTPATIENT
Start: 2018-09-17

## 2018-09-17 RX ORDER — FAMOTIDINE 20 MG/1
20 TABLET, FILM COATED ORAL DAILY
Qty: 60 TABLET | Refills: 3 | Status: SHIPPED | OUTPATIENT
Start: 2018-09-17

## 2018-09-17 RX ORDER — ATORVASTATIN CALCIUM 20 MG/1
20 TABLET, FILM COATED ORAL NIGHTLY
Qty: 30 TABLET | Refills: 3 | Status: SHIPPED | OUTPATIENT
Start: 2018-09-17

## 2018-09-17 RX ADMIN — MIDODRINE HYDROCHLORIDE 5 MG: 5 TABLET ORAL at 08:08

## 2018-09-17 RX ADMIN — LEVOTHYROXINE SODIUM 100 MCG: 0.1 TABLET ORAL at 05:51

## 2018-09-17 RX ADMIN — ENOXAPARIN SODIUM 30 MG: 30 INJECTION SUBCUTANEOUS at 08:08

## 2018-09-17 RX ADMIN — METOPROLOL SUCCINATE 25 MG: 50 TABLET, EXTENDED RELEASE ORAL at 08:08

## 2018-09-17 RX ADMIN — ACETAMINOPHEN 650 MG: 325 TABLET ORAL at 10:12

## 2018-09-17 RX ADMIN — FAMOTIDINE 20 MG: 20 TABLET ORAL at 08:08

## 2018-09-17 RX ADMIN — Medication 10 ML: at 08:09

## 2018-09-17 ASSESSMENT — PAIN SCALES - GENERAL: PAINLEVEL_OUTOF10: 4

## 2018-09-17 NOTE — DISCHARGE SUMMARY
Consults:     Significant Diagnostic Studies:    See summary of labs/x-rays/path report for further details      Treatments:   IVF    Disposition:   [facility]  Follow up with Suiz Berry MD in 1 weeks.     Signed:  Suzi Berry MD,MPH  9/17/2018, 8:10 AM

## 2018-11-14 LAB
EKG P AXIS: 75 DEGREES
EKG P-R INTERVAL: 222 MS
EKG Q-T INTERVAL: 388 MS
EKG QRS DURATION: 104 MS
EKG QTC CALCULATION (BAZETT): 416 MS
EKG T AXIS: 64 DEGREES

## 2018-12-17 NOTE — PLAN OF CARE
Problem: Falls - Risk of:  Goal: Will remain free from falls  Will remain free from falls   Outcome: Ongoing    Goal: Absence of physical injury  Absence of physical injury   Outcome: Ongoing      Problem: Risk for Impaired Skin Integrity  Goal: Tissue integrity - skin and mucous membranes  Structural intactness and normal physiological function of skin and  mucous membranes.    Outcome: Ongoing      Problem: Nutrition  Goal: Optimal nutrition therapy  Nutrition Problem: Severe malnutrition, in context of chronic illness  Intervention: Food and/or Nutrient Delivery: Continue current diet, Start ONS  Nutritional Goals: PO 50% or more, wt stable or gain, maintain skin integrity     Outcome: Ongoing      Problem: Pain:  Goal: Pain level will decrease  Pain level will decrease   Outcome: Ongoing    Goal: Control of acute pain  Control of acute pain   Outcome: Ongoing    Goal: Control of chronic pain  Control of chronic pain   Outcome: Ongoing Full Thickness Lip Wedge Repair (Flap) Text: Given the location of the defect and the proximity to free margins a full thickness wedge repair was deemed most appropriate.  Using a sterile surgical marker, the appropriate repair was drawn incorporating the defect and placing the expected incisions perpendicular to the vermilion border.  The vermilion border was also meticulously outlined to ensure appropriate reapproximation during the repair.  The area thus outlined was incised through and through with a #15 scalpel blade.  The muscularis and dermis were reaproximated with deep sutures following hemostasis. Care was taken to realign the vermilion border before proceeding with the superficial closure.  Once the vermilion was realigned the superfical and mucosal closure was finished.

## 2020-11-03 PROBLEM — N17.9 AKI (ACUTE KIDNEY INJURY) (HCC): Status: RESOLVED | Noted: 2018-09-13 | Resolved: 2020-11-03

## (undated) DEVICE — ENDO KIT,LOURDES HOSPITAL: Brand: MEDLINE INDUSTRIES, INC.